# Patient Record
Sex: MALE | Race: WHITE | Employment: OTHER | URBAN - METROPOLITAN AREA
[De-identification: names, ages, dates, MRNs, and addresses within clinical notes are randomized per-mention and may not be internally consistent; named-entity substitution may affect disease eponyms.]

---

## 2019-02-07 DIAGNOSIS — M25.562 PAIN, JOINT, KNEE, LEFT: ICD-10-CM

## 2019-02-07 DIAGNOSIS — M25.561 PAIN IN JOINT OF RIGHT KNEE: Primary | ICD-10-CM

## 2019-03-04 ENCOUNTER — TELEPHONE (OUTPATIENT)
Dept: ORTHOPEDIC SURGERY | Age: 84
End: 2019-03-04

## 2019-08-21 ENCOUNTER — HOSPITAL ENCOUNTER (INPATIENT)
Age: 84
LOS: 3 days | Discharge: NURSING, REHAB OR LONG-TERM CARE | DRG: 101 | End: 2019-08-25
Attending: EMERGENCY MEDICINE | Admitting: INTERNAL MEDICINE
Payer: MEDICARE

## 2019-08-21 ENCOUNTER — APPOINTMENT (OUTPATIENT)
Dept: CT IMAGING | Age: 84
DRG: 101 | End: 2019-08-21
Payer: MEDICARE

## 2019-08-21 DIAGNOSIS — I48.91 ATRIAL FIBRILLATION WITH RVR (HCC): ICD-10-CM

## 2019-08-21 DIAGNOSIS — R41.82 ALTERED MENTAL STATUS, UNSPECIFIED ALTERED MENTAL STATUS TYPE: Primary | ICD-10-CM

## 2019-08-21 DIAGNOSIS — F41.9 ANXIETY: ICD-10-CM

## 2019-08-21 PROCEDURE — 87040 BLOOD CULTURE FOR BACTERIA: CPT

## 2019-08-21 PROCEDURE — 81001 URINALYSIS AUTO W/SCOPE: CPT

## 2019-08-21 PROCEDURE — 85025 COMPLETE CBC W/AUTO DIFF WBC: CPT

## 2019-08-21 PROCEDURE — 83605 ASSAY OF LACTIC ACID: CPT

## 2019-08-21 PROCEDURE — 99291 CRITICAL CARE FIRST HOUR: CPT

## 2019-08-21 PROCEDURE — 72125 CT NECK SPINE W/O DYE: CPT

## 2019-08-21 PROCEDURE — 36415 COLL VENOUS BLD VENIPUNCTURE: CPT

## 2019-08-21 PROCEDURE — 70450 CT HEAD/BRAIN W/O DYE: CPT

## 2019-08-21 PROCEDURE — 83735 ASSAY OF MAGNESIUM: CPT

## 2019-08-21 PROCEDURE — 93005 ELECTROCARDIOGRAM TRACING: CPT | Performed by: INTERNAL MEDICINE

## 2019-08-21 PROCEDURE — 84443 ASSAY THYROID STIM HORMONE: CPT

## 2019-08-21 PROCEDURE — 80048 BASIC METABOLIC PNL TOTAL CA: CPT

## 2019-08-22 ENCOUNTER — APPOINTMENT (OUTPATIENT)
Dept: GENERAL RADIOLOGY | Age: 84
DRG: 101 | End: 2019-08-22
Payer: MEDICARE

## 2019-08-22 ENCOUNTER — APPOINTMENT (OUTPATIENT)
Dept: CT IMAGING | Age: 84
DRG: 101 | End: 2019-08-22
Payer: MEDICARE

## 2019-08-22 ENCOUNTER — APPOINTMENT (OUTPATIENT)
Dept: VASCULAR LAB | Age: 84
DRG: 101 | End: 2019-08-22
Payer: MEDICARE

## 2019-08-22 PROBLEM — I48.91 ATRIAL FIBRILLATION (HCC): Status: ACTIVE | Noted: 2019-08-22

## 2019-08-22 PROBLEM — E87.1 HYPONATREMIA: Status: ACTIVE | Noted: 2019-08-22

## 2019-08-22 PROBLEM — I63.9 ACUTE CVA (CEREBROVASCULAR ACCIDENT) (HCC): Status: ACTIVE | Noted: 2019-08-22

## 2019-08-22 PROBLEM — I50.22 CHRONIC SYSTOLIC HEART FAILURE (HCC): Status: ACTIVE | Noted: 2019-08-22

## 2019-08-22 LAB
ANION GAP SERPL CALCULATED.3IONS-SCNC: 10 MMOL/L (ref 3–16)
ANION GAP SERPL CALCULATED.3IONS-SCNC: 13 MMOL/L (ref 3–16)
BACTERIA: ABNORMAL /HPF
BASOPHILS ABSOLUTE: 0 K/UL (ref 0–0.2)
BASOPHILS RELATIVE PERCENT: 0.2 %
BILIRUBIN URINE: NEGATIVE
BLOOD, URINE: NEGATIVE
BUN BLDV-MCNC: 16 MG/DL (ref 7–20)
BUN BLDV-MCNC: 17 MG/DL (ref 7–20)
CALCIUM SERPL-MCNC: 8.7 MG/DL (ref 8.3–10.6)
CALCIUM SERPL-MCNC: 9.2 MG/DL (ref 8.3–10.6)
CHLORIDE BLD-SCNC: 92 MMOL/L (ref 99–110)
CHLORIDE BLD-SCNC: 95 MMOL/L (ref 99–110)
CLARITY: CLEAR
CO2: 25 MMOL/L (ref 21–32)
CO2: 25 MMOL/L (ref 21–32)
COLOR: YELLOW
CREAT SERPL-MCNC: 0.9 MG/DL (ref 0.8–1.3)
CREAT SERPL-MCNC: 0.9 MG/DL (ref 0.8–1.3)
EKG ATRIAL RATE: 24 BPM
EKG DIAGNOSIS: NORMAL
EKG Q-T INTERVAL: 328 MS
EKG QRS DURATION: 158 MS
EKG QTC CALCULATION (BAZETT): 478 MS
EKG R AXIS: 127 DEGREES
EKG T AXIS: -34 DEGREES
EKG VENTRICULAR RATE: 128 BPM
EOSINOPHILS ABSOLUTE: 0.1 K/UL (ref 0–0.6)
EOSINOPHILS RELATIVE PERCENT: 1.3 %
EPITHELIAL CELLS, UA: ABNORMAL /HPF
ESTIMATED AVERAGE GLUCOSE: 151.3 MG/DL
GFR AFRICAN AMERICAN: >60
GFR AFRICAN AMERICAN: >60
GFR NON-AFRICAN AMERICAN: >60
GFR NON-AFRICAN AMERICAN: >60
GLUCOSE BLD-MCNC: 120 MG/DL (ref 70–99)
GLUCOSE BLD-MCNC: 137 MG/DL (ref 70–99)
GLUCOSE BLD-MCNC: 150 MG/DL (ref 70–99)
GLUCOSE BLD-MCNC: 229 MG/DL (ref 70–99)
GLUCOSE BLD-MCNC: 77 MG/DL (ref 70–99)
GLUCOSE BLD-MCNC: 78 MG/DL (ref 70–99)
GLUCOSE BLD-MCNC: 83 MG/DL (ref 70–99)
GLUCOSE BLD-MCNC: 85 MG/DL (ref 70–99)
GLUCOSE BLD-MCNC: 98 MG/DL (ref 70–99)
GLUCOSE URINE: NEGATIVE MG/DL
HBA1C MFR BLD: 6.9 %
HCT VFR BLD CALC: 31.8 % (ref 40.5–52.5)
HEMOGLOBIN: 10.3 G/DL (ref 13.5–17.5)
KETONES, URINE: NEGATIVE MG/DL
LACTIC ACID, SEPSIS: 1.2 MMOL/L (ref 0.4–1.9)
LACTIC ACID: 1.7 MMOL/L (ref 0.4–2)
LEUKOCYTE ESTERASE, URINE: NEGATIVE
LYMPHOCYTES ABSOLUTE: 0.8 K/UL (ref 1–5.1)
LYMPHOCYTES RELATIVE PERCENT: 9.6 %
MAGNESIUM: 1.7 MG/DL (ref 1.8–2.4)
MCH RBC QN AUTO: 29.5 PG (ref 26–34)
MCHC RBC AUTO-ENTMCNC: 32.6 G/DL (ref 31–36)
MCV RBC AUTO: 90.5 FL (ref 80–100)
MICROSCOPIC EXAMINATION: YES
MONOCYTES ABSOLUTE: 0.7 K/UL (ref 0–1.3)
MONOCYTES RELATIVE PERCENT: 9 %
NEUTROPHILS ABSOLUTE: 6.6 K/UL (ref 1.7–7.7)
NEUTROPHILS RELATIVE PERCENT: 79.9 %
NITRITE, URINE: NEGATIVE
PDW BLD-RTO: 18 % (ref 12.4–15.4)
PERFORMED ON: ABNORMAL
PERFORMED ON: NORMAL
PH UA: 6 (ref 5–8)
PLATELET # BLD: 119 K/UL (ref 135–450)
PMV BLD AUTO: 7.1 FL (ref 5–10.5)
POTASSIUM REFLEX MAGNESIUM: 3.5 MMOL/L (ref 3.5–5.1)
POTASSIUM SERPL-SCNC: 4.1 MMOL/L (ref 3.5–5.1)
PROTEIN UA: ABNORMAL MG/DL
RBC # BLD: 3.51 M/UL (ref 4.2–5.9)
RBC UA: ABNORMAL /HPF (ref 0–2)
SODIUM BLD-SCNC: 130 MMOL/L (ref 136–145)
SODIUM BLD-SCNC: 130 MMOL/L (ref 136–145)
SPECIFIC GRAVITY UA: 1.01 (ref 1–1.03)
TSH SERPL DL<=0.05 MIU/L-ACNC: 8.7 UIU/ML (ref 0.27–4.2)
URINE REFLEX TO CULTURE: ABNORMAL
URINE TYPE: ABNORMAL
UROBILINOGEN, URINE: 4 E.U./DL
WBC # BLD: 8.3 K/UL (ref 4–11)
WBC UA: ABNORMAL /HPF (ref 0–5)

## 2019-08-22 PROCEDURE — 70498 CT ANGIOGRAPHY NECK: CPT

## 2019-08-22 PROCEDURE — 95819 EEG AWAKE AND ASLEEP: CPT

## 2019-08-22 PROCEDURE — 70496 CT ANGIOGRAPHY HEAD: CPT

## 2019-08-22 PROCEDURE — 71045 X-RAY EXAM CHEST 1 VIEW: CPT

## 2019-08-22 PROCEDURE — 6370000000 HC RX 637 (ALT 250 FOR IP): Performed by: INTERNAL MEDICINE

## 2019-08-22 PROCEDURE — 2580000003 HC RX 258: Performed by: INTERNAL MEDICINE

## 2019-08-22 PROCEDURE — 1200000000 HC SEMI PRIVATE

## 2019-08-22 PROCEDURE — 6360000002 HC RX W HCPCS: Performed by: EMERGENCY MEDICINE

## 2019-08-22 PROCEDURE — 2500000003 HC RX 250 WO HCPCS: Performed by: EMERGENCY MEDICINE

## 2019-08-22 PROCEDURE — 80048 BASIC METABOLIC PNL TOTAL CA: CPT

## 2019-08-22 PROCEDURE — 93880 EXTRACRANIAL BILAT STUDY: CPT

## 2019-08-22 PROCEDURE — 92526 ORAL FUNCTION THERAPY: CPT

## 2019-08-22 PROCEDURE — 96374 THER/PROPH/DIAG INJ IV PUSH: CPT

## 2019-08-22 PROCEDURE — 87040 BLOOD CULTURE FOR BACTERIA: CPT

## 2019-08-22 PROCEDURE — 36415 COLL VENOUS BLD VENIPUNCTURE: CPT

## 2019-08-22 PROCEDURE — 83605 ASSAY OF LACTIC ACID: CPT

## 2019-08-22 PROCEDURE — 83036 HEMOGLOBIN GLYCOSYLATED A1C: CPT

## 2019-08-22 PROCEDURE — 92610 EVALUATE SWALLOWING FUNCTION: CPT

## 2019-08-22 PROCEDURE — 6360000004 HC RX CONTRAST MEDICATION: Performed by: EMERGENCY MEDICINE

## 2019-08-22 PROCEDURE — 2580000003 HC RX 258: Performed by: EMERGENCY MEDICINE

## 2019-08-22 RX ORDER — SODIUM PHOSPHATE, DIBASIC AND SODIUM PHOSPHATE, MONOBASIC 7; 19 G/133ML; G/133ML
1 ENEMA RECTAL DAILY PRN
Status: ON HOLD | COMMUNITY
End: 2019-08-22

## 2019-08-22 RX ORDER — NICOTINE POLACRILEX 4 MG
15 LOZENGE BUCCAL PRN
Status: DISCONTINUED | OUTPATIENT
Start: 2019-08-22 | End: 2019-08-25 | Stop reason: HOSPADM

## 2019-08-22 RX ORDER — DEXTROSE MONOHYDRATE 25 G/50ML
12.5 INJECTION, SOLUTION INTRAVENOUS PRN
Status: DISCONTINUED | OUTPATIENT
Start: 2019-08-22 | End: 2019-08-25 | Stop reason: HOSPADM

## 2019-08-22 RX ORDER — SODIUM CHLORIDE 0.9 % (FLUSH) 0.9 %
10 SYRINGE (ML) INJECTION EVERY 12 HOURS SCHEDULED
Status: DISCONTINUED | OUTPATIENT
Start: 2019-08-22 | End: 2019-08-25 | Stop reason: HOSPADM

## 2019-08-22 RX ORDER — 0.9 % SODIUM CHLORIDE 0.9 %
500 INTRAVENOUS SOLUTION INTRAVENOUS ONCE
Status: COMPLETED | OUTPATIENT
Start: 2019-08-22 | End: 2019-08-22

## 2019-08-22 RX ORDER — CARVEDILOL 25 MG/1
25 TABLET ORAL 2 TIMES DAILY WITH MEALS
Status: DISCONTINUED | OUTPATIENT
Start: 2019-08-22 | End: 2019-08-22

## 2019-08-22 RX ORDER — CLOPIDOGREL BISULFATE 75 MG/1
75 TABLET ORAL DAILY
Status: DISCONTINUED | OUTPATIENT
Start: 2019-08-22 | End: 2019-08-22

## 2019-08-22 RX ORDER — FUROSEMIDE 40 MG/1
40 TABLET ORAL 2 TIMES DAILY
Status: ON HOLD | COMMUNITY
End: 2020-10-17

## 2019-08-22 RX ORDER — ACETAMINOPHEN 325 MG/1
650 TABLET ORAL EVERY 4 HOURS PRN
Status: ON HOLD | COMMUNITY
End: 2020-11-30 | Stop reason: HOSPADM

## 2019-08-22 RX ORDER — TRAMADOL HYDROCHLORIDE 50 MG/1
50 TABLET ORAL EVERY 6 HOURS PRN
Status: ON HOLD | COMMUNITY
End: 2019-08-23 | Stop reason: CLARIF

## 2019-08-22 RX ORDER — MAGNESIUM SULFATE IN WATER 40 MG/ML
2 INJECTION, SOLUTION INTRAVENOUS ONCE
Status: COMPLETED | OUTPATIENT
Start: 2019-08-22 | End: 2019-08-22

## 2019-08-22 RX ORDER — ASPIRIN 325 MG
325 TABLET ORAL DAILY
Status: DISCONTINUED | OUTPATIENT
Start: 2019-08-22 | End: 2019-08-24

## 2019-08-22 RX ORDER — TRAZODONE HYDROCHLORIDE 50 MG/1
50 TABLET ORAL NIGHTLY
Status: DISCONTINUED | OUTPATIENT
Start: 2019-08-22 | End: 2019-08-22

## 2019-08-22 RX ORDER — SODIUM CHLORIDE 9 MG/ML
INJECTION, SOLUTION INTRAVENOUS CONTINUOUS
Status: DISCONTINUED | OUTPATIENT
Start: 2019-08-22 | End: 2019-08-22

## 2019-08-22 RX ORDER — ONDANSETRON 2 MG/ML
4 INJECTION INTRAMUSCULAR; INTRAVENOUS EVERY 6 HOURS PRN
Status: DISCONTINUED | OUTPATIENT
Start: 2019-08-22 | End: 2019-08-25 | Stop reason: HOSPADM

## 2019-08-22 RX ORDER — AMPICILLIN 500 MG/1
500 CAPSULE ORAL 4 TIMES DAILY
Status: ON HOLD | COMMUNITY
End: 2019-08-25 | Stop reason: HOSPADM

## 2019-08-22 RX ORDER — DULOXETIN HYDROCHLORIDE 60 MG/1
60 CAPSULE, DELAYED RELEASE ORAL DAILY
COMMUNITY
End: 2020-02-28 | Stop reason: CLARIF

## 2019-08-22 RX ORDER — METOPROLOL TARTRATE 5 MG/5ML
5 INJECTION INTRAVENOUS ONCE
Status: COMPLETED | OUTPATIENT
Start: 2019-08-22 | End: 2019-08-22

## 2019-08-22 RX ORDER — TAMSULOSIN HYDROCHLORIDE 0.4 MG/1
0.4 CAPSULE ORAL DAILY
Status: DISCONTINUED | OUTPATIENT
Start: 2019-08-22 | End: 2019-08-25 | Stop reason: HOSPADM

## 2019-08-22 RX ORDER — SODIUM CHLORIDE 0.9 % (FLUSH) 0.9 %
10 SYRINGE (ML) INJECTION PRN
Status: DISCONTINUED | OUTPATIENT
Start: 2019-08-22 | End: 2019-08-25 | Stop reason: HOSPADM

## 2019-08-22 RX ORDER — DEXTROSE MONOHYDRATE 50 MG/ML
100 INJECTION, SOLUTION INTRAVENOUS PRN
Status: DISCONTINUED | OUTPATIENT
Start: 2019-08-22 | End: 2019-08-25 | Stop reason: HOSPADM

## 2019-08-22 RX ORDER — ATORVASTATIN CALCIUM 20 MG/1
20 TABLET, FILM COATED ORAL DAILY
Status: DISCONTINUED | OUTPATIENT
Start: 2019-08-22 | End: 2019-08-25 | Stop reason: HOSPADM

## 2019-08-22 RX ORDER — CETIRIZINE HYDROCHLORIDE 10 MG/1
10 TABLET ORAL DAILY
COMMUNITY
End: 2020-02-28 | Stop reason: CLARIF

## 2019-08-22 RX ORDER — FUROSEMIDE 20 MG/1
20 TABLET ORAL DAILY
Status: DISCONTINUED | OUTPATIENT
Start: 2019-08-22 | End: 2019-08-25 | Stop reason: HOSPADM

## 2019-08-22 RX ADMIN — APIXABAN 5 MG: 5 TABLET, FILM COATED ORAL at 13:11

## 2019-08-22 RX ADMIN — SODIUM CHLORIDE: 0.9 INJECTION, SOLUTION INTRAVENOUS at 04:36

## 2019-08-22 RX ADMIN — METOPROLOL TARTRATE 25 MG: 25 TABLET ORAL at 18:01

## 2019-08-22 RX ADMIN — METOPROLOL TARTRATE 5 MG: 5 INJECTION INTRAVENOUS at 00:52

## 2019-08-22 RX ADMIN — APIXABAN 5 MG: 5 TABLET, FILM COATED ORAL at 21:05

## 2019-08-22 RX ADMIN — ATORVASTATIN CALCIUM 20 MG: 20 TABLET, FILM COATED ORAL at 10:00

## 2019-08-22 RX ADMIN — Medication 10 ML: at 21:06

## 2019-08-22 RX ADMIN — FUROSEMIDE 20 MG: 20 TABLET ORAL at 13:08

## 2019-08-22 RX ADMIN — IOPAMIDOL 80 ML: 755 INJECTION, SOLUTION INTRAVENOUS at 01:08

## 2019-08-22 RX ADMIN — TAMSULOSIN HYDROCHLORIDE 0.4 MG: 0.4 CAPSULE ORAL at 10:00

## 2019-08-22 RX ADMIN — SODIUM CHLORIDE 500 ML: 9 INJECTION, SOLUTION INTRAVENOUS at 01:55

## 2019-08-22 RX ADMIN — INSULIN LISPRO 1 UNITS: 100 INJECTION, SOLUTION INTRAVENOUS; SUBCUTANEOUS at 21:05

## 2019-08-22 RX ADMIN — MAGNESIUM SULFATE HEPTAHYDRATE 2 G: 40 INJECTION, SOLUTION INTRAVENOUS at 01:55

## 2019-08-22 ASSESSMENT — ENCOUNTER SYMPTOMS
ABDOMINAL PAIN: 0
COUGH: 0

## 2019-08-22 ASSESSMENT — PAIN SCALES - GENERAL: PAINLEVEL_OUTOF10: 0

## 2019-08-22 NOTE — ED PROVIDER NOTES
repolarization changes, likely rate related. Measurable intervals normal.    RADIOLOGY:  CTA NECK W CONTRAST   Final Result   1. This exam is degraded by poor contrast bolus/timing and extensive    beam hardening artifact from dental amalgam.  Evaluation of the carotid    bulbs and proximal internal carotid arteries is essentially nondiagnostic. There is otherwise no significant steno-occlusive lesion in the neck. 2.  Patchy marrow attenuation diffusely is nonspecific. Please correlate    with any history of cancer. CTA HEAD W CONTRAST   Final Result     No significant steno-occlusive lesion in the head. XR CHEST PORTABLE   Final Result   Cardiomegaly. No acute pulmonary findings. CT Cervical Spine WO Contrast   Final Result     No acute fracture. Anterolisthesis at C3-4 measuring 2 mm and at C4-5 measuring 2 mm is    likely due to degenerative disc disease. Moderate cervical spondylosis. CT Head WO Contrast   Final Result     No acute CT findings in the head/brain.           MRI brain without contrast    (Results Pending)       LABS:   Results for orders placed or performed during the hospital encounter of 08/21/19   CBC Auto Differential   Result Value Ref Range    WBC 8.3 4.0 - 11.0 K/uL    RBC 3.51 (L) 4.20 - 5.90 M/uL    Hemoglobin 10.3 (L) 13.5 - 17.5 g/dL    Hematocrit 31.8 (L) 40.5 - 52.5 %    MCV 90.5 80.0 - 100.0 fL    MCH 29.5 26.0 - 34.0 pg    MCHC 32.6 31.0 - 36.0 g/dL    RDW 18.0 (H) 12.4 - 15.4 %    Platelets 417 (L) 826 - 450 K/uL    MPV 7.1 5.0 - 10.5 fL    Neutrophils % 79.9 %    Lymphocytes % 9.6 %    Monocytes % 9.0 %    Eosinophils % 1.3 %    Basophils % 0.2 %    Neutrophils # 6.6 1.7 - 7.7 K/uL    Lymphocytes # 0.8 (L) 1.0 - 5.1 K/uL    Monocytes # 0.7 0.0 - 1.3 K/uL    Eosinophils # 0.1 0.0 - 0.6 K/uL    Basophils # 0.0 0.0 - 0.2 K/uL   Basic Metabolic Panel w/ Reflex to MG   Result Value Ref Range    Sodium 130 (L) 136 - 145 mmol/L reviewed. Patient was given the following medications:  Orders Placed This Encounter   Medications    metoprolol (LOPRESSOR) injection 5 mg    iopamidol (ISOVUE-370) 76 % injection 80 mL    magnesium sulfate 2 g in 50 mL IVPB premix    0.9 % sodium chloride bolus    aspirin tablet 325 mg    atorvastatin (LIPITOR) tablet 20 mg    carvedilol (COREG) tablet 25 mg    clopidogrel (PLAVIX) tablet 75 mg    tamsulosin (FLOMAX) capsule 0.4 mg    traZODone (DESYREL) tablet 50 mg    sodium chloride flush 0.9 % injection 10 mL    sodium chloride flush 0.9 % injection 10 mL    magnesium hydroxide (MILK OF MAGNESIA) 400 MG/5ML suspension 30 mL    ondansetron (ZOFRAN) injection 4 mg    glucose (GLUTOSE) 40 % oral gel 15 g    dextrose 50 % IV solution    glucagon (rDNA) injection 1 mg    dextrose 5 % solution    insulin lispro (HUMALOG) injection pen 0-6 Units    insulin lispro (HUMALOG) injection pen 0-3 Units    apixaban (ELIQUIS) tablet 2.5 mg    0.9 % sodium chloride infusion       CONSULTS:  IP CONSULT TO PHARMACY  IP CONSULT TO HOSPITALIST  IP CONSULT TO NEUROLOGY    MEDICAL DECISIONMAKING / ASSESSMENT / Rae Yolette is a 80 y.o. male with history of stroke/TIA, Parkinson's disease, A. fib on Eliquis who presents the emergency department today with altered mental status. He was found with decreased responsiveness at his care facility and does not have a last known normal.  Vitals notable for tachycardia with a regular rhythm on the monitor, A. fib on EKG without ischemic changes. Glucose is normal.  Patient has aphasia and occasional nonsensical speech, he is oriented to person only. Initial NIH stroke scale is 3. His family states this has happened occasionally, but is not his baseline. No evidence of infectious source on exam, labs, or chest x-ray. Patient taken to CT scanner and without acute findings. CTA without large vessel occlusion.   He is not a TPA candidate given unclear last

## 2019-08-22 NOTE — PROGRESS NOTES
Speech Language Pathology  Facility/Department: [facility]   CLINICAL BEDSIDE SWALLOW EVALUATION/Treatment note    NAME: Cindy Hensley  : 1933  MRN: [id number]    ADMISSION DATE: 2019  ADMITTING DIAGNOSIS: has 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; 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; and Acute CVA (cerebrovascular accident) ([address] on their problem list.  ONSET DATE: 19    Recent Chest Xray 8/21/10  Cardiomegaly.  No acute pulmonary findings.         CT of head 19  No acute CT findings in the head/brain.         Date of Eval: 2019  Evaluating Therapist: Lavina Klinefelter    Current Diet level:  Current Diet : NPO  Current Liquid Diet : NPO    Primary Complaint  Patient Complaint: pt states he doesn't remember having any problems with speech coming in    Pain: denies    Reason for Referral  Cindy Hensley was referred for a bedside swallow evaluation to assess the efficiency of his swallow function, identify signs and symptoms of aspiration and make recommendations regarding safe dietary consistencies, effective compensatory strategies, and safe eating environment. History Of Present Illness:   80 y.o. male with past medical history of Parkinson disease, TIAs chronic A. fib, on anticoagulation with Eliquis was found at his nursing facility less responsive. It is unknown when he was at his normal baseline. His speech is consistent with word salad. Further history is not available from the patient due to his altered level of mentation.       Impression  Dysphagia Diagnosis: Swallow function appears grossly intact  Dysphagia Impression : pt alert, oriented, follows commands and conveses approriately. No word finding or paraphasias noted during conversation. Pt spouse states speech resolved, pt states he doesn't recall having problems when admitted. Spouse reports this has happened 4 times and then speech resolves fully.   Pt denies any swallowing issues at baseline. Presented pt with puree and thin liquids as well as a kimmy cracker. Pt demonstrated no overt signs of aspiration: no coughing/throat clearing or change in vocal quality. Good laryngeal elevation upon palpation of anterior neck. Pt with no difficulty mastication cracker, no oral residue. Dysphagia Outcome Severity Scale: Level 7: Normal in all situations     Treatment Plan  Requires SLP Intervention: Yes  Duration/Frequency of Treatment: 1-2 x  D/C Recommendations: To be determined     Recommended Diet and Intervention  Diet Solids Recommendation: Regular  Liquid Consistency Recommendation: Thin -if any s/s of aspiration emerge, or there is respiratory decline,  make NPO until further evaluated by SLP  Recommended Form of Meds: Whole with puree  Therapeutic Interventions: Diet tolerance monitoring;Oral care; Patient/Family education    Compensatory Swallowing Strategies  Compensatory Swallowing Strategies: Upright as possible for all oral intake    Treatment/Goals  Pt to be seen to address the following goals:  1-The patient will tolerate recommended diet without observed clinical signs of aspiration  2- The patient Ross Sayer will verbalize/demonstrate understanding of dysphagia recommendations  8/22: Educated pt to purpose of visit, s/s of aspiration, concern if aspiration occurs, rationale for diet recommendation/strategies to reduce risk for aspiration and instruction to notify staff if any signs emerge. Also discussed will monitor need for full speech/language/cog eval.  Pt and spouse stated understanding  Cont goal    General  Chart Reviewed: Yes  Behavior/Cognition: Alert; Cooperative;Pleasant mood  Respiratory Status: Room air  Breath Sounds: Clear  Communication Observation: Functional - no anomia/paraphasias noted. Speech was fluent and intelligible. Spouse reports speech has resolved and that this has happened 4 x, with resolution of speech.   Follows Directions: Simple  Dentition: Adequate  Patient Positioning: Upright in bed  Baseline Vocal Quality: Normal  Volitional Cough: Strong  Prior Dysphagia History: none  Consistencies Administered: Reg solid; Thin - teaspoon; Thin - cup; Thin - straw; Ice Chips    Vision/Hearing  Vision  Vision: Within Functional Limits  Hearing  Hearing: Within functional limits    Oral Motor Deficits  Oral/Motor  Oral Motor: Within functional limits    Oral Phase Dysfunction  WFL     Indicators of Pharyngeal Phase Dysfunction  WFL-     Prognosis  Prognosis  Prognosis for safe diet advancement: good  Barriers to reach goals: (resolution of symptoms)  Individuals consulted  Consulted and agree with results and recommendations: Patient; Family member;RN  Family member consulted: spouse    Education  Patient Education: pt/spouse educated to purpose of visit  Patient Education Response: Verbalizes understanding  Safety Devices in place: Yes  Type of devices: Call light within reach       Therapy Time  SLP Individual Minutes  Time In: 0755  Time Out: 0820  Minutes: 25     Plan:  Recommended diet: regular diet/thin liquids - if any s/s of aspiration emerge, or there is respiratory decline,  make NPO until further evaluated by SLP  Dc recommendation: monitor need for full speech eval  Pt therapy goal: denies any problems, n/a  Pt dc goal: to get home  Louie Echols M.S./[facility]-SLP #[id number]  Pg.  # [phone]  Needs met prior to leaving room, call light within reach, d/w HARPREET Pastrana  This document will serve as a dc summary if pt dc prior to next visit  8/22/2019 8:44 AM

## 2019-08-22 NOTE — H&P
Hospital Medicine History & Physical      PCP: [address]    Date of Admission: 8/21/2019    Date of Service: Pt seen/examined on 8/22/2019 and Admitted to Inpatient with expected LOS greater than two midnights due to medical therapy. Chief Complaint: Altered level of consciousness      History Of Present Illness:     80 y.o. male with past medical history of Parkinson disease, TIAs chronic A. fib, on anticoagulation with Eliquis was found at his nursing facility less responsive. It is unknown when he was at his normal baseline. His speech is consistent with word salad. Further history is not available from the patient due to his altered level of mentation. Past Medical History:          Diagnosis Date    Arthritis     Back pain     CAD (coronary artery disease)     [facility] [address]     prostate    Diabetes mellitus ([facility] [address] [facility]     Hyperlipidemia     Hypertension     Rosacea        Past Surgical History:          Procedure Laterality Date    BACK SURGERY      CATARACT REMOVAL WITH IMPLANT      COLONOSCOPY  9/21/11    COLONOSCOPY  2/11/2015    polyp, diverticulosis    CORONARY ANGIOPLASTY WITH STENT PLACEMENT      CORONARY ARTERY BYPASS GRAFT      EYE SURGERY      JOINT REPLACEMENT      knee    PROSTATE SURGERY      seed implants    SHOULDER SURGERY         Medications Prior to Admission:      Prior to Admission medications    Medication Sig Start Date End Date Taking? Authorizing Provider   B-D ULTRAFINE III SHORT PEN 31G X 8 MM MISC  8/24/13   Historical Provider, MD   traZODone (DESYREL) 50 MG tablet Take 50 mg by mouth nightly. Historical Provider, MD   aspirin 325 MG tablet Take 325 mg by mouth daily. Historical Provider, MD   Insulin Isophane Human (HUMULIN N SC) Inject 26 Units into the skin daily. Historical Provider, MD   Insulin Lispro Prot & Lispro (HUMALOG MIX 75/25 SC) Inject 26 Units into the skin nightly.     Historical Provider, MD   tamsulosin (FLOMAX) 0.4

## 2019-08-22 NOTE — PLAN OF CARE
Problem: Falls - Risk of:  Goal: Will remain free from falls  Description  Will remain free from falls  Pt is free from falls. Fall precautions are in place: bed is locked and in lowest position, side rails are up x 2, bed alarm is on, nonskid socks are on, bedside table and call light are within reach. Will continue to monitor.   8/22/2019 1733 by Ruchi Nascimento RN  Outcome: Ongoing

## 2019-08-23 ENCOUNTER — APPOINTMENT (OUTPATIENT)
Dept: CT IMAGING | Age: 84
DRG: 101 | End: 2019-08-23
Payer: MEDICARE

## 2019-08-23 LAB
ANION GAP SERPL CALCULATED.3IONS-SCNC: 10 MMOL/L (ref 3–16)
BUN BLDV-MCNC: 13 MG/DL (ref 7–20)
CALCIUM SERPL-MCNC: 8.8 MG/DL (ref 8.3–10.6)
CHLORIDE BLD-SCNC: 94 MMOL/L (ref 99–110)
CHOLESTEROL, TOTAL: 93 MG/DL (ref 0–199)
CO2: 26 MMOL/L (ref 21–32)
CREAT SERPL-MCNC: 0.8 MG/DL (ref 0.8–1.3)
GFR AFRICAN AMERICAN: >60
GFR NON-AFRICAN AMERICAN: >60
GLUCOSE BLD-MCNC: 138 MG/DL (ref 70–99)
GLUCOSE BLD-MCNC: 145 MG/DL (ref 70–99)
GLUCOSE BLD-MCNC: 150 MG/DL (ref 70–99)
GLUCOSE BLD-MCNC: 186 MG/DL (ref 70–99)
GLUCOSE BLD-MCNC: 217 MG/DL (ref 70–99)
HDLC SERPL-MCNC: 37 MG/DL (ref 40–60)
LDL CHOLESTEROL CALCULATED: 41 MG/DL
PERFORMED ON: ABNORMAL
POTASSIUM SERPL-SCNC: 4 MMOL/L (ref 3.5–5.1)
SODIUM BLD-SCNC: 130 MMOL/L (ref 136–145)
TRIGL SERPL-MCNC: 76 MG/DL (ref 0–150)
VLDLC SERPL CALC-MCNC: 15 MG/DL

## 2019-08-23 PROCEDURE — 6370000000 HC RX 637 (ALT 250 FOR IP): Performed by: INTERNAL MEDICINE

## 2019-08-23 PROCEDURE — 1200000000 HC SEMI PRIVATE

## 2019-08-23 PROCEDURE — 97116 GAIT TRAINING THERAPY: CPT

## 2019-08-23 PROCEDURE — 97530 THERAPEUTIC ACTIVITIES: CPT

## 2019-08-23 PROCEDURE — 80061 LIPID PANEL: CPT

## 2019-08-23 PROCEDURE — 2580000003 HC RX 258: Performed by: INTERNAL MEDICINE

## 2019-08-23 PROCEDURE — 36415 COLL VENOUS BLD VENIPUNCTURE: CPT

## 2019-08-23 PROCEDURE — 70450 CT HEAD/BRAIN W/O DYE: CPT

## 2019-08-23 PROCEDURE — 80048 BASIC METABOLIC PNL TOTAL CA: CPT

## 2019-08-23 PROCEDURE — 97162 PT EVAL MOD COMPLEX 30 MIN: CPT

## 2019-08-23 PROCEDURE — 92526 ORAL FUNCTION THERAPY: CPT | Performed by: SPEECH-LANGUAGE PATHOLOGIST

## 2019-08-23 RX ORDER — TRAZODONE HYDROCHLORIDE 50 MG/1
50 TABLET ORAL NIGHTLY PRN
Status: ON HOLD | COMMUNITY
End: 2019-08-25 | Stop reason: SDUPTHER

## 2019-08-23 RX ORDER — LORAZEPAM 1 MG/1
1 TABLET ORAL EVERY 6 HOURS PRN
Status: ON HOLD | COMMUNITY
End: 2019-08-25 | Stop reason: SDUPTHER

## 2019-08-23 RX ORDER — METOPROLOL SUCCINATE 25 MG/1
25 TABLET, EXTENDED RELEASE ORAL DAILY
Status: ON HOLD | COMMUNITY
End: 2019-08-25 | Stop reason: HOSPADM

## 2019-08-23 RX ORDER — ALBUTEROL SULFATE 90 UG/1
2 AEROSOL, METERED RESPIRATORY (INHALATION) EVERY 4 HOURS PRN
Status: ON HOLD | COMMUNITY
End: 2020-10-17

## 2019-08-23 RX ORDER — BISACODYL 10 MG
10 SUPPOSITORY, RECTAL RECTAL DAILY PRN
COMMUNITY
End: 2020-02-28 | Stop reason: CLARIF

## 2019-08-23 RX ORDER — OXYCODONE HYDROCHLORIDE 5 MG/1
10 TABLET ORAL EVERY 4 HOURS PRN
Status: ON HOLD | COMMUNITY
End: 2019-08-25 | Stop reason: HOSPADM

## 2019-08-23 RX ORDER — CLOTRIMAZOLE 1 %
CREAM (GRAM) TOPICAL 2 TIMES DAILY
COMMUNITY
End: 2020-02-28 | Stop reason: CLARIF

## 2019-08-23 RX ORDER — ONDANSETRON 4 MG/1
4 TABLET, FILM COATED ORAL EVERY 8 HOURS PRN
Status: ON HOLD | COMMUNITY
End: 2019-08-25 | Stop reason: HOSPADM

## 2019-08-23 RX ORDER — LORAZEPAM 0.5 MG/1
0.5 TABLET ORAL EVERY 8 HOURS PRN
Status: DISCONTINUED | OUTPATIENT
Start: 2019-08-23 | End: 2019-08-25 | Stop reason: HOSPADM

## 2019-08-23 RX ORDER — METOPROLOL TARTRATE 50 MG/1
50 TABLET, FILM COATED ORAL 2 TIMES DAILY
Status: DISCONTINUED | OUTPATIENT
Start: 2019-08-23 | End: 2019-08-25

## 2019-08-23 RX ORDER — POLYETHYLENE GLYCOL 3350 17 G/17G
17 POWDER, FOR SOLUTION ORAL DAILY
Status: ON HOLD | COMMUNITY
End: 2020-11-30 | Stop reason: HOSPADM

## 2019-08-23 RX ORDER — SODIUM PHOSPHATE, DIBASIC AND SODIUM PHOSPHATE, MONOBASIC 7; 19 G/133ML; G/133ML
1 ENEMA RECTAL DAILY PRN
COMMUNITY
End: 2020-02-28 | Stop reason: CLARIF

## 2019-08-23 RX ADMIN — Medication 10 ML: at 20:06

## 2019-08-23 RX ADMIN — ASPIRIN 325 MG ORAL TABLET 325 MG: 325 PILL ORAL at 09:04

## 2019-08-23 RX ADMIN — FUROSEMIDE 20 MG: 20 TABLET ORAL at 09:05

## 2019-08-23 RX ADMIN — INSULIN LISPRO 1 UNITS: 100 INJECTION, SOLUTION INTRAVENOUS; SUBCUTANEOUS at 17:11

## 2019-08-23 RX ADMIN — METOPROLOL TARTRATE 50 MG: 50 TABLET ORAL at 20:06

## 2019-08-23 RX ADMIN — ATORVASTATIN CALCIUM 20 MG: 20 TABLET, FILM COATED ORAL at 09:05

## 2019-08-23 RX ADMIN — Medication 10 ML: at 09:13

## 2019-08-23 RX ADMIN — INSULIN LISPRO 1 UNITS: 100 INJECTION, SOLUTION INTRAVENOUS; SUBCUTANEOUS at 07:44

## 2019-08-23 RX ADMIN — INSULIN LISPRO 1 UNITS: 100 INJECTION, SOLUTION INTRAVENOUS; SUBCUTANEOUS at 20:07

## 2019-08-23 RX ADMIN — METOPROLOL TARTRATE 25 MG: 25 TABLET ORAL at 07:43

## 2019-08-23 RX ADMIN — TAMSULOSIN HYDROCHLORIDE 0.4 MG: 0.4 CAPSULE ORAL at 09:05

## 2019-08-23 RX ADMIN — INSULIN LISPRO 1 UNITS: 100 INJECTION, SOLUTION INTRAVENOUS; SUBCUTANEOUS at 11:03

## 2019-08-23 RX ADMIN — LORAZEPAM 0.5 MG: 0.5 TABLET ORAL at 12:10

## 2019-08-23 RX ADMIN — APIXABAN 5 MG: 5 TABLET, FILM COATED ORAL at 09:04

## 2019-08-23 RX ADMIN — APIXABAN 5 MG: 5 TABLET, FILM COATED ORAL at 20:06

## 2019-08-23 ASSESSMENT — PAIN SCALES - GENERAL
PAINLEVEL_OUTOF10: 0

## 2019-08-23 NOTE — PROGRESS NOTES
Clinical Pharmacy Progress Note  Patient Education    Patient and wife counseled about Keppra. Reviewed indication and why medication is being used, importance of taking as prescribed, how to take, and what to do if a dose is missed. Discussed potential side effects of medication, including mood changes and CNS depression. Patient and wife verbalized understanding and teach back method was used for verification. All questions answered. Patient told to call with further questions.     Iram MoreauD, BCPS  8/23/2019 4:48 PM

## 2019-08-23 NOTE — PROGRESS NOTES
Speech Language Pathology  Dysphagia - attempt    Chart reviewed, d/w RN Keisha Brooke who states no concerns for swallowing. She reports pt back to baseline. RN states wife states prior to this, pt little forgetful at baseline. Pt sleeping at this time, RN states pt has not slept. Therefore, will follow up later this date or as pt available and schedule allows. Annabella Horan M.S./[facility]-SLP #[id number]  Pg.  # [phone]

## 2019-08-23 NOTE — PROGRESS NOTES
Hospital Medicine Care  Progress Note      Chief complain; Altered Mental Status and Atrial Fibrillation (RVR)            Subjective:     Feels well  Ambulating in arias way  Per family confusion improved. No focal deficits. Denies any cp, sob, nausea, emesis. Review of Systems:     Review of Systems as mentioned above, other ros is negative. Objective:       Medications        Scheduled Meds:   metoprolol tartrate  50 mg Oral BID    aspirin  325 mg Oral Daily    atorvastatin  20 mg Oral Daily    tamsulosin  0.4 mg Oral Daily    sodium chloride flush  10 mL Intravenous 2 times per day    insulin lispro  0-6 Units Subcutaneous TID WC    insulin lispro  0-3 Units Subcutaneous Nightly    furosemide  20 mg Oral Daily    apixaban  5 mg Oral BID     Continuous Infusions:   dextrose       PRN Meds:.sodium chloride flush, magnesium hydroxide, ondansetron, glucose, dextrose, glucagon (rDNA), dextrose      Allergies  Allergies   Allergen Reactions    Gabapentin     Morphine          Vitals:    08/22/19 2301 08/23/19 0331 08/23/19 0737 08/23/19 0930   BP: 119/81 110/71 (!) 144/90 117/79   Pulse: 79 103 116 101   Resp: 16 16 16 16   Temp: 98.1 °F (36.7 °C) 98.2 °F (36.8 °C) 98.8 °F (37.1 °C) 97.9 °F (36.6 °C)   TempSrc: Oral Oral Oral Oral   SpO2: 97% 100% 93% 96%   Weight:       Height:             Physical exam:         Physical Exam   Constitutional: He is oriented to person, place, and time. He appears well-developed and well-nourished. HENT:   Head: Normocephalic and atraumatic. Cardiovascular: Normal rate. An irregular rhythm present. No murmur heard. Pulmonary/Chest: Effort normal and breath sounds normal. No stridor. No respiratory distress. Abdominal: Soft. Bowel sounds are normal. He exhibits no distension. There is no tenderness. Musculoskeletal: Normal range of motion. He exhibits no edema. Neurological: He is alert and oriented to person, place, and time.    Skin: Skin is warm and dry. Capillary refill takes less than 2 seconds. Psychiatric: He has a normal mood and affect. His behavior is normal.             Labs      Recent Labs     08/21/19  0012   WBC 8.3   HGB 10.3*   HCT 31.8*   *   MCV 90.5        Recent Labs     08/21/19  0012 08/22/19  0352 08/23/19  0554   * 130* 130*   K 3.5 4.1 4.0   CL 92* 95* 94*   CO2 25 25 26   BUN 17 16 13   CREATININE 0.9 0.9 0.8       No results for input(s): AST, ALT, ALB, BILIDIR, BILITOT, ALKPHOS in the last 72 hours. Recent Labs     08/21/19  0012   MG 1.70*       Radiology  CT HEAD WO CONTRAST   Final Result      1. No evidence of recent intracranial hemorrhage. 2. Parenchymal volume loss and chronic small vessel ischemic changes in the white matter. VL DUP CAROTID BILATERAL         CTA NECK W CONTRAST   Final Result   1. This exam is degraded by poor contrast bolus/timing and extensive    beam hardening artifact from dental amalgam.  Evaluation of the carotid    bulbs and proximal internal carotid arteries is essentially nondiagnostic. There is otherwise no significant steno-occlusive lesion in the neck. 2.  Patchy marrow attenuation diffusely is nonspecific. Please correlate    with any history of cancer. CTA HEAD W CONTRAST   Final Result     No significant steno-occlusive lesion in the head. XR CHEST PORTABLE   Final Result   Cardiomegaly. No acute pulmonary findings. CT Cervical Spine WO Contrast   Final Result     No acute fracture. Anterolisthesis at C3-4 measuring 2 mm and at C4-5 measuring 2 mm is    likely due to degenerative disc disease. Moderate cervical spondylosis. CT Head WO Contrast   Final Result     No acute CT findings in the head/brain. Assessment and Plan:    Active Problems:    Acute CVA (cerebrovascular accident) ([address]    Hyponatremia    Atrial fibrillation ([address]    Chronic systolic heart failure ([address]  Resolved Problems:    * No

## 2019-08-23 NOTE — PROGRESS NOTES
with RVR ([facility] [address] was also pertinent to this visit. has a past medical history of Arthritis, Back pain, CAD (coronary artery disease), Cancer ([facility] [address] Diabetes mellitus ([facility] [address] Hyperlipidemia, Hypertension, and Rosacea. has a past surgical history that includes Coronary angioplasty with stent; back surgery; joint replacement; shoulder surgery; eye surgery; Cataract removal with implant; Coronary artery bypass graft; Prostate surgery; Colonoscopy (9/21/11); and Colonoscopy (2/11/2015). Treatment Diagnosis: Impaired ADLs, mobility and activity tolerance      Restrictions  Position Activity Restriction  Other position/activity restrictions: up as tolerated. Knee immobilizer for  L LE at all times and WBAT per family(had TKA early August)    Subjective   General  Chart Reviewed: Yes  Additional Pertinent Hx: Pt is an 80 y.o. adm through ED with altered mental status, found on ground at NH with decreased responsiveness. Head CT: neg. Cspine CT: neg fracture, anterolisthesis C3-4 and C4-5. CXR: neg. CTA head: neg. Carotids <50% bilat. Repeat Head CT: pending. PMH: arthritis, CAD, prostate CA, DM, hyperlipidemia, HTN, back surgery, CABG, L TKR   Family / Caregiver Present: Yes(son)  Diagnosis: TIA vs CVA  Subjective  Subjective: Pt in bed upon entry, agreeable to OT evaluation. Patient Currently in Pain: Denies    Social/Functional History  Social/Functional History  Type of Home: Facility([facility])  ADL Assistance: Needs assistance  Homemaking Assistance: Needs assistance  Ambulation Assistance: Needs assistance(walking with walker at SNF)  Transfer Assistance: Needs assistance  Additional Comments: Fourth of July had TIA - went to [facility]. Had been back home after that until knee replacement 2 weeks ago. Has been at [facility] since knee replacement. Was getting PT/OT. Per son, pt was w/c bound prior to knee replacement and getting assist for transfers from wife.         Objective   Vision:

## 2019-08-23 NOTE — PROGRESS NOTES
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  No acute pulmonary findings.          CT of head 19  No acute CT findings in the head/brain.            Previous MBS    Chart reviewed. Medical Diagnosis: acute CVA  Treatment Diagnosis: dysphagia    BSE Impression 19  pt alert, oriented, follows commands and conveses approriately. No word finding or paraphasias noted during conversation. Pt spouse states speech resolved, pt states he doesn't recall having problems when admitted. Spouse reports this has happened 4 times and then speech resolves fully. Pt denies any swallowing issues at baseline. Presented pt with puree and thin liquids as well as a kimmy cracker. Pt demonstrated no overt signs of aspiration: no coughing/throat clearing or change in vocal quality. Good laryngeal elevation upon palpation of anterior neck.   Pt with no difficulty mastication cracker, no oral residue.     MBS results     Pain:     Current Diet : regular with thin liquids    Treatment:  Pt seen bedside to address the following goals:  1-The patient will tolerate recommended diet without observed clinical signs of aspiration    2- The patient Lucio Hu will verbalize/demonstrate understanding of dysphagia recommendations  : Educated pt to purpose of visit, s/s of aspiration, concern if aspiration occurs, rationale

## 2019-08-23 NOTE — DISCHARGE INSTR - COC
Continuity of Care Form    Patient Name: Melissa Sanchez   :  1933  MRN:  [id number]    Admit date:  2019  Discharge date:  ***    Code Status Order: Full Code   Advance Directives:   Advance Care Flowsheet Documentation     Date/Time Healthcare Directive Type of Healthcare Directive Copy in [address] Agent's Name Healthcare Agent's Phone Number    [phone]  Yes, patient has an advance directive for healthcare treatment  Durable power of  for health care;Living will  No, copy requested from family  Spouse  --  --          Admitting Physician:  Nathanael Morales MD  PCP: [address]    Discharging Nurse: [facility]/Room#: 5453/2165-19  Discharging Unit Phone Number: ***    Emergency Contact:   Extended Emergency Contact Information  Primary Emergency Contact: Karen Workman  Address: [address] [place] [address] [address]  Phone: [phone]  Relation: Spouse  Secondary Emergency Contact: [address] [place] [address] Phone: [phone]  Relation: Child    Past Surgical History:  Past Surgical History:   Procedure Laterality Date    BACK SURGERY      CATARACT REMOVAL WITH IMPLANT      COLONOSCOPY  11    COLONOSCOPY  2015    polyp, diverticulosis    CORONARY ANGIOPLASTY WITH STENT PLACEMENT      CORONARY ARTERY BYPASS GRAFT      EYE SURGERY      JOINT REPLACEMENT      knee    PROSTATE SURGERY      seed implants    SHOULDER SURGERY         Immunization History: There is no immunization history on file for this patient.     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 T84.89XA    Acute CVA (cerebrovascular accident) ([facility] [facility] I63.9    Hyponatremia E87.1    Atrial fibrillation (HCC) I48.91    Chronic systolic heart failure (HCC) I50.22       Isolation/Infection: Therapies: {THERAPEUTIC INTERVENTION:5223529261}  Weight Bearing Status/Restrictions: [address]}  Other Medical Equipment (for information only, NOT a DME order):  {EQUIPMENT:132550345}  Other Treatments: ***    Patient's personal belongings (please select all that are sent with patient):  {CHP DME Belongings:310395904}    RN SIGNATURE:  {Esignature:[id number]}    CASE MANAGEMENT/SOCIAL WORK SECTION    Inpatient Status Date: 2019  Readmission Risk Assessment Score:  Readmission Risk              Risk of Unplanned Readmission:        13           Discharging to Facility/ Agency   · Name: [facility]  · Address:  · Phone:Mfmvey- [phone]  · MSE:[phone]    ·     / signature: Electronically signed by Oneyda Orellana RN on 19 at 5:53 PM    PHYSICIAN SECTION    Prognosis: Fair    Condition at Discharge: Stable    Rehab Potential (if transferring to Rehab):  Fair    Recommended Labs or Other Treatments After Discharge:   PTOT  Renal panel in 1 week       Physician Certification: I certify the above information and transfer of Tex Held  is necessary for the continuing treatment of the diagnosis listed and that he requires [facility] Update Admission H&P: No change in H&P    PHYSICIAN SIGNATURE:  Electronically signed by Kiara Noble MD on 19 at 11:25 AM

## 2019-08-23 NOTE — PROGRESS NOTES
Pt is alert and oriented times 3. Pt is disoriented  to time. NIh is a 3. Pt resting comfortably and denies any pain. All fall precautions in place. Will continue to monitor.

## 2019-08-23 NOTE — PROGRESS NOTES
Physical Therapy    Facility/Department: [facility]  Initial Assessment and Treatment    NAME: Lili Lowe  : 1933  MRN: [id number]    Date of Service: 2019    Discharge Recommendations:Shantanu Nieto scored a 17/24 on the AM-PAC short mobility form. Current research shows that an AM-PAC score of 17 or less is typically not associated with a discharge to the patient's home setting. Based on the patients AM-PAC score and their current functional mobility deficits, it is recommended that the patient have 3-5 sessions per week of Physical Therapy at d/c to increase the patients independence. PT Equipment Recommendations  Equipment Needed: (defer)    Assessment   Body structures, Functions, Activity limitations: Decreased functional mobility ; Decreased endurance;Decreased balance;Decreased strength;Decreased safe awareness  Assessment: Pt with decreased independent mobility s/p L TKR 2 weeks ago. Pt currently needing CG to mod assist for transfers and CG/min assist for gt. Pt needs cues for safety with transfers/gt. Not safe to ambulate alone. Rec cont skilled PT to maximize mobility and independence  Treatment Diagnosis: impaired functional mobility 2/2 decreased strength and endurance  Decision Making: Medium Complexity  Patient Education: Educated pt on role of PT, use of call light, importance of OOB, discharge rec. Pt verbalized understanding - may need reinforcement  REQUIRES PT FOLLOW UP: Yes       Patient Diagnosis(es): The primary encounter diagnosis was Altered mental status, unspecified altered mental status type. A diagnosis of Atrial fibrillation with RVR (HCC) was also pertinent to this visit. has a past medical history of Arthritis, Back pain, CAD (coronary artery disease), Cancer ([facility] [address] Diabetes mellitus ([facility] [address] Hyperlipidemia, Hypertension, and Rosacea.    has a past surgical history that includes Coronary angioplasty with stent; back surgery; joint replacement; shoulder surgery; eye surgery; Cataract removal with implant; Coronary artery bypass graft; Prostate surgery; Colonoscopy (9/21/11); and Colonoscopy (2/11/2015). Restrictions  Position Activity Restriction  Other position/activity restrictions: up as tolerated. Knee immobilizer for  L LE at all times and WBAT per family(had TKA early August)  Vision/Hearing        Subjective  General  Chart Reviewed: Yes  Additional Pertinent Hx: Pt is an 80 y.o. adm through ED with altered mental status, found on ground at NH with decreased responsiveness. Head CT: neg. Cspine CT: neg fracture, anterolisthesis C3-4 and C4-5. CXR: neg. CTA head: neg. Carotids <50% bilat. Repeat Head CT: pending. PMH: arthritis, CAD, prostate CA, DM, hyperlipidemia, HTN, back surgery, CABG, L TKR   Referring Practitioner: Yasmine Caplelan MD  Referral Date : 08/22/19  Diagnosis: Acute CVA  Subjective  Subjective: Pt found in supine. Agreeable to PT. Pain Screening  Patient Currently in Pain: Denies  Vital Signs  Patient Currently in Pain: Denies       Orientation  Orientation  Overall Orientation Status: Within Functional Limits(except month \"October\" )  Social/Functional History  Social/Functional History  Type of Home: Facility([facility])  ADL Assistance: Needs assistance  Homemaking Assistance: Needs assistance  Ambulation Assistance: Needs assistance(walking with walker at SNF)  Transfer Assistance: Needs assistance  Additional Comments: Fourth of July had TIA - went to [facility]. Had been back home after that until knee replacement 2 weeks ago. Has been at [facility] ([place] since knee replacement. Was getting PT/OT. Per son, pt was w/c bound prior to knee replacement and getting assist for transfers from wife.    Cognition        Objective          AROM RLE (degrees)  RLE AROM: WFL  AROM LLE (degrees)  LLE AROM : WFL(hip/ankle WFL, knee immobilized)  Strength RLE  Strength RLE: WFL  Strength LLE  Strength LLE: (able to SLR independently)        Bed mobility  Supine to Sit: Stand by assistance(HOB elevated, with rail)  Transfers  Sit to Stand: Minimal Assistance(from bed, CGA from elevated commode, min to mod assist from lower chair)  Stand to sit: Contact guard assistance(to chair and commode, min assist to chair after ambulating- cues for safety backing up)  Ambulation  Ambulation?: Yes  Ambulation 1  Device: Rolling Walker  Assistance: Contact guard assistance(initially, progressed to min assist as pt fatigued)  Quality of Gait: trunk flexed, step through gt pattern, heavy UE support on walker, near end of walk - pt more impulsive and pushing walker too far ahead needing cues for safety  Distance: 20', 60', 70'   Comments: seated rest between each walk            Treatment included: bed mobility, transfers, gt, pt education    Plan   Plan  Times per week: 2-5  Current Treatment Recommendations: Strengthening, Functional Mobility Training, Endurance Training, Balance Training, Gait Training, Patient/Caregiver Education & Training, Safety Education & Training  Safety Devices  Type of devices: Call light within reach, Chair alarm in place, Nurse notified, Left in chair    G-Code       OutComes Score                                                  AM-PAC Score  AM-PAC Inpatient Mobility Raw Score : 17 (08/23/19 0952)  AM-PAC Inpatient T-Scale Score : 42.13 (08/23/19 0952)  Mobility Inpatient CMS 0-100% Score: 50.57 (08/23/19 0952)  Mobility Inpatient CMS G-Code Modifier : CK (08/23/19 0952)          Goals  Short term goals  Time Frame for Short term goals: By discharge  Short term goal 1: Sup to sit modified independent  Short term goal 2: Pt will transfer sit to stand SBA  Short term goal 3: Pt will amb >100' with AD SBA       Therapy Time   Individual Concurrent Group Co-treatment   Time In 0852         Time Out 0935         Minutes 43               Timed Code Treatment Minutes: 28      Total Treatment Minutes:  43  If pt d/c'd

## 2019-08-24 LAB
ANION GAP SERPL CALCULATED.3IONS-SCNC: 13 MMOL/L (ref 3–16)
BUN BLDV-MCNC: 16 MG/DL (ref 7–20)
CALCIUM SERPL-MCNC: 9 MG/DL (ref 8.3–10.6)
CHLORIDE BLD-SCNC: 95 MMOL/L (ref 99–110)
CO2: 25 MMOL/L (ref 21–32)
CREAT SERPL-MCNC: 0.9 MG/DL (ref 0.8–1.3)
GFR AFRICAN AMERICAN: >60
GFR NON-AFRICAN AMERICAN: >60
GLUCOSE BLD-MCNC: 149 MG/DL (ref 70–99)
GLUCOSE BLD-MCNC: 153 MG/DL (ref 70–99)
GLUCOSE BLD-MCNC: 234 MG/DL (ref 70–99)
GLUCOSE BLD-MCNC: 257 MG/DL (ref 70–99)
GLUCOSE BLD-MCNC: 258 MG/DL (ref 70–99)
PERFORMED ON: ABNORMAL
POTASSIUM SERPL-SCNC: 4.7 MMOL/L (ref 3.5–5.1)
SODIUM BLD-SCNC: 133 MMOL/L (ref 136–145)

## 2019-08-24 PROCEDURE — 6370000000 HC RX 637 (ALT 250 FOR IP): Performed by: INTERNAL MEDICINE

## 2019-08-24 PROCEDURE — 1200000000 HC SEMI PRIVATE

## 2019-08-24 PROCEDURE — 6360000002 HC RX W HCPCS: Performed by: INTERNAL MEDICINE

## 2019-08-24 PROCEDURE — 80048 BASIC METABOLIC PNL TOTAL CA: CPT

## 2019-08-24 PROCEDURE — 99222 1ST HOSP IP/OBS MODERATE 55: CPT | Performed by: INTERNAL MEDICINE

## 2019-08-24 PROCEDURE — 2580000003 HC RX 258: Performed by: INTERNAL MEDICINE

## 2019-08-24 PROCEDURE — 36415 COLL VENOUS BLD VENIPUNCTURE: CPT

## 2019-08-24 RX ORDER — DIGOXIN 0.25 MG/ML
250 INJECTION INTRAMUSCULAR; INTRAVENOUS EVERY 4 HOURS
Status: COMPLETED | OUTPATIENT
Start: 2019-08-24 | End: 2019-08-24

## 2019-08-24 RX ORDER — POLYETHYLENE GLYCOL 3350 17 G/17G
17 POWDER, FOR SOLUTION ORAL DAILY
Status: DISCONTINUED | OUTPATIENT
Start: 2019-08-24 | End: 2019-08-25 | Stop reason: HOSPADM

## 2019-08-24 RX ORDER — LEVETIRACETAM 250 MG/1
250 TABLET ORAL 2 TIMES DAILY
Status: DISCONTINUED | OUTPATIENT
Start: 2019-08-24 | End: 2019-08-24

## 2019-08-24 RX ORDER — DIGOXIN 125 MCG
125 TABLET ORAL DAILY
Status: DISCONTINUED | OUTPATIENT
Start: 2019-08-25 | End: 2019-08-25 | Stop reason: HOSPADM

## 2019-08-24 RX ORDER — TRAZODONE HYDROCHLORIDE 50 MG/1
50 TABLET ORAL NIGHTLY
Status: DISCONTINUED | OUTPATIENT
Start: 2019-08-24 | End: 2019-08-25 | Stop reason: HOSPADM

## 2019-08-24 RX ADMIN — TRAZODONE HYDROCHLORIDE 50 MG: 50 TABLET ORAL at 21:24

## 2019-08-24 RX ADMIN — INSULIN LISPRO 2 UNITS: 100 INJECTION, SOLUTION INTRAVENOUS; SUBCUTANEOUS at 13:53

## 2019-08-24 RX ADMIN — FUROSEMIDE 20 MG: 20 TABLET ORAL at 08:10

## 2019-08-24 RX ADMIN — APIXABAN 5 MG: 5 TABLET, FILM COATED ORAL at 21:24

## 2019-08-24 RX ADMIN — INSULIN LISPRO 3 UNITS: 100 INJECTION, SOLUTION INTRAVENOUS; SUBCUTANEOUS at 17:09

## 2019-08-24 RX ADMIN — METOPROLOL TARTRATE 50 MG: 50 TABLET ORAL at 21:24

## 2019-08-24 RX ADMIN — Medication 10 ML: at 21:25

## 2019-08-24 RX ADMIN — INSULIN LISPRO 1 UNITS: 100 INJECTION, SOLUTION INTRAVENOUS; SUBCUTANEOUS at 08:08

## 2019-08-24 RX ADMIN — DIGOXIN 250 MCG: 0.25 INJECTION INTRAMUSCULAR; INTRAVENOUS at 16:59

## 2019-08-24 RX ADMIN — ATORVASTATIN CALCIUM 20 MG: 20 TABLET, FILM COATED ORAL at 08:11

## 2019-08-24 RX ADMIN — POLYETHYLENE GLYCOL 3350 17 G: 17 POWDER, FOR SOLUTION ORAL at 15:04

## 2019-08-24 RX ADMIN — APIXABAN 5 MG: 5 TABLET, FILM COATED ORAL at 08:10

## 2019-08-24 RX ADMIN — METOPROLOL TARTRATE 50 MG: 50 TABLET ORAL at 08:10

## 2019-08-24 RX ADMIN — LORAZEPAM 0.5 MG: 0.5 TABLET ORAL at 17:10

## 2019-08-24 RX ADMIN — TAMSULOSIN HYDROCHLORIDE 0.4 MG: 0.4 CAPSULE ORAL at 08:10

## 2019-08-24 RX ADMIN — INSULIN LISPRO 2 UNITS: 100 INJECTION, SOLUTION INTRAVENOUS; SUBCUTANEOUS at 21:26

## 2019-08-24 RX ADMIN — CARBIDOPA AND LEVODOPA 1 TABLET: 25; 100 TABLET ORAL at 21:25

## 2019-08-24 RX ADMIN — CARBIDOPA AND LEVODOPA 1 TABLET: 25; 100 TABLET ORAL at 14:12

## 2019-08-24 RX ADMIN — LORAZEPAM 0.5 MG: 0.5 TABLET ORAL at 08:15

## 2019-08-24 RX ADMIN — DIGOXIN 250 MCG: 0.25 INJECTION INTRAMUSCULAR; INTRAVENOUS at 10:27

## 2019-08-24 RX ADMIN — ASPIRIN 325 MG ORAL TABLET 325 MG: 325 PILL ORAL at 08:11

## 2019-08-24 ASSESSMENT — PAIN SCALES - GENERAL: PAINLEVEL_OUTOF10: 0

## 2019-08-24 NOTE — CONSULTS
Clinical Pharmacy Consult Note    80 y.o. male is admitted with Acute CVA (cerebrovascular accident). Pharmacy has been asked by Dr. Martine Smith to adjust all drips to normal saline as appropriate based on compatibility to avoid fluid shifts since D5 is osmotically active. The following intermittent IV drips/infusions have been adjusted to saline: No medication is ordered at this time. Should any of the following medications be needed, it must remain in D5W due to incompatibility with normal saline:  Amphotericin  Epinephrine  Mycophenolate  Nitroprusside  Penicillin G Potassium    Please be aware that patient has D5W ordered as part of hypoglycemia orderset. RPh will follow daily to ensure all new IVPBs + drips are in NS. Please call with questions.     Phoebe Severe, Rph
activity:     Days per week: Not on file     Minutes per session: Not on file    Stress: Not on file   Relationships    Social connections:     Talks on phone: Not on file     Gets together: Not on file     Attends Zoroastrian service: Not on file     Active member of club or organization: Not on file     Attends meetings of clubs or organizations: Not on file     Relationship status: Not on file    Intimate partner violence:     Fear of current or ex partner: Not on file     Emotionally abused: Not on file     Physically abused: Not on file     Forced sexual activity: Not on file   Other Topics Concern    Not on file   Social History Narrative    Not on file       Medications: Allergies   Allergen Reactions    Gabapentin     Morphine        Medications Prior to Admission: carbidopa-levodopa (SINEMET)  MG per tablet, Take 2 tablets by mouth 3 times daily  acetaminophen (TYLENOL) 325 MG tablet, Take 325 mg by mouth every 4 hours as needed for Pain  traMADol (ULTRAM) 50 MG tablet, Take 50 mg by mouth every 6 hours as needed for Pain. cetirizine (ZYRTEC) 10 MG tablet, Take 10 mg by mouth daily as needed for Allergies  ampicillin (PRINCIPEN) 500 MG capsule, Take 500 mg by mouth 4 times daily  cyanocobalamin 1000 MCG tablet, Take 1,000 mcg by mouth daily  DULoxetine (CYMBALTA) 60 MG extended release capsule, Take 60 mg by mouth daily  apixaban (ELIQUIS) 5 MG TABS tablet, Take 5 mg by mouth 2 times daily  sacubitril-valsartan (ENTRESTO) 24-26 MG per tablet, Take 1 tablet by mouth 2 times daily  Sodium Phosphates (FLEET) 7-19 GM/118ML, Place 1 enema rectally daily as needed  furosemide (LASIX) 20 MG tablet, Take 20 mg by mouth daily  insulin lispro (HUMALOG) 100 UNIT/ML injection vial, Inject into the skin 3 times daily (before meals)  tamsulosin (FLOMAX) 0.4 MG capsule, Take 0.4 mg by mouth daily. atorvastatin (LIPITOR) 20 MG tablet, Take 20 mg by mouth daily.     B-D ULTRAFINE III SHORT PEN 31G X 8 MM
dextrose         PRN:  LORazepam, sodium chloride flush, magnesium hydroxide, ondansetron, glucose, dextrose, glucagon (rDNA), dextrose    Allergy:     Gabapentin and Morphine       Review of Systems:     All 12 point review of symptoms completed. Pertinent positives identified in the HPI, all other review of symptoms negative as below. CONSTITUTIONAL: No fatigue  SKIN: No rash or pruritis. EYES: No visual changes or diplopia. No scleral icterus. ENT: No Headaches, hearing loss or vertigo. No mouth sores or sore throat. CARDIOVASCULAR: No chest pain/chest pressure/chest discomfort. No palpitations. No edema. RESPIRATORY: No dyspnea. No cough or wheezing, no sputum production. GASTROINTESTINAL: No N/V/D. No abdominal pain, appetite loss, blood in stools. GENITOURINARY: No dysuria, trouble voiding, or hematuria. MUSCULOSKELETAL:  No gait disturbance, weakness or joint complaints. NEUROLOGICAL: No headache, diplopia, change in muscle strength, numbness or tingling. No change in gait, balance, coordination, mood, affect, memory, mentation, behavior. ENDOCRINE: No excessive thirst, fluid intake, or urination. No tremor. HEMATOLOGIC: No abnormal bruising or bleeding. ALLERGY: No nasal congestion or hives.       Physical Examination:     Vitals:    08/24/19 1126 08/24/19 1408 08/24/19 1656 08/24/19 1705   BP: 133/79 121/77 (!) 129/51 132/82   Pulse: 85 90 83 86   Resp: 16 16     Temp: 98.7 °F (37.1 °C) 97.1 °F (36.2 °C)     TempSrc: Oral Oral     SpO2: 98% 96% 93%    Weight:       Height:           Wt Readings from Last 3 Encounters:   08/22/19 229 lb 15 oz (104.3 kg)   09/25/15 240 lb (108.9 kg)   02/11/15 237 lb (107.5 kg)         General Appearance:  Alert, cooperative, no distress, appears stated age Appropriate weight   Head:  Normocephalic, without obvious abnormality, atraumatic   Eyes:  PERRL, conjunctiva/corneas clear EOM intact  Ears normal   Throat no lesions       Nose: Nares normal, no drainage

## 2019-08-24 NOTE — PLAN OF CARE
Problem: Falls - Risk of:  Goal: Will remain free from falls  Description  Will remain free from falls  8/24/2019 0024 by Brinda Rivera RN  Outcome: Ongoing   Calls out appropriately. Bed locked in lowest position. Bed alarm on. Call light/belongings within reach. Will continue to monitor.

## 2019-08-24 NOTE — PROGRESS NOTES
Hospital Medicine Care  Progress Note      Chief complain; Altered Mental Status and Atrial Fibrillation (RVR)            Subjective:     Feels well  Ambulating in [address]  Afib with RVR this am  No nausea, emesis  Afebrile. Review of Systems:     Review of Systems as mentioned above, other ros is negative. Objective:       Medications        Scheduled Meds:   digoxin  250 mcg Intravenous Q4H    [START ON 8/25/2019] digoxin  125 mcg Oral Daily    metoprolol tartrate  50 mg Oral BID    aspirin  325 mg Oral Daily    atorvastatin  20 mg Oral Daily    tamsulosin  0.4 mg Oral Daily    sodium chloride flush  10 mL Intravenous 2 times per day    insulin lispro  0-6 Units Subcutaneous TID WC    insulin lispro  0-3 Units Subcutaneous Nightly    furosemide  20 mg Oral Daily    apixaban  5 mg Oral BID     Continuous Infusions:   dextrose       PRN Meds:. LORazepam, sodium chloride flush, magnesium hydroxide, ondansetron, glucose, dextrose, glucagon (rDNA), dextrose      Allergies  Allergies   Allergen Reactions    Gabapentin     Morphine          Vitals:    08/23/19 2300 08/24/19 0343 08/24/19 0724 08/24/19 1126   BP: 103/66 135/82 (!) 139/95 133/79   Pulse: 103 113 123 85   Resp: 14 14 16 16   Temp: 98.8 °F (37.1 °C) 99.8 °F (37.7 °C) 94 °F (34.4 °C) 98.7 °F (37.1 °C)   TempSrc: Oral Oral Oral Oral   SpO2: 97% 97% 94% 98%   Weight:       Height:             Physical exam:         Physical Exam   Constitutional: He is oriented to person, place, and time. He appears well-developed and well-nourished. HENT:   Head: Normocephalic and atraumatic. Cardiovascular: Normal rate. An irregular rhythm present. No murmur heard. Pulmonary/Chest: Effort normal and breath sounds normal. No stridor. No respiratory distress. Abdominal: Soft. Bowel sounds are normal. He exhibits no distension. There is no tenderness. Musculoskeletal: Normal range of motion. He exhibits no edema.    Neurological: He is alert and oriented to person, place, and time. Skin: Skin is warm and dry. Capillary refill takes less than 2 seconds. Psychiatric: He has a normal mood and affect. His behavior is normal.             Labs      No results for input(s): WBC, HGB, HCT, PLT, MCV in the last 72 hours. Recent Labs     08/22/19  0352 08/23/19  0554 08/24/19  0559   * 130* 133*   K 4.1 4.0 4.7   CL 95* 94* 95*   CO2 25 26 25   BUN 16 13 16   CREATININE 0.9 0.8 0.9       No results for input(s): AST, ALT, ALB, BILIDIR, BILITOT, ALKPHOS in the last 72 hours. No results for input(s): MG in the last 72 hours. Radiology  CT HEAD WO CONTRAST   Final Result      1. No evidence of recent intracranial hemorrhage. 2. Parenchymal volume loss and chronic small vessel ischemic changes in the white matter. VL DUP CAROTID BILATERAL   Final Result      CTA NECK W CONTRAST   Final Result   1. This exam is degraded by poor contrast bolus/timing and extensive    beam hardening artifact from dental amalgam.  Evaluation of the carotid    bulbs and proximal internal carotid arteries is essentially nondiagnostic. There is otherwise no significant steno-occlusive lesion in the neck. 2.  Patchy marrow attenuation diffusely is nonspecific. Please correlate    with any history of cancer. CTA HEAD W CONTRAST   Final Result     No significant steno-occlusive lesion in the head. XR CHEST PORTABLE   Final Result   Cardiomegaly. No acute pulmonary findings. CT Cervical Spine WO Contrast   Final Result     No acute fracture. Anterolisthesis at C3-4 measuring 2 mm and at C4-5 measuring 2 mm is    likely due to degenerative disc disease. Moderate cervical spondylosis. CT Head WO Contrast   Final Result     No acute CT findings in the head/brain. Assessment and Plan:    Active Problems:    Acute CVA (cerebrovascular accident) ([address]    Hyponatremia    Atrial fibrillation ([address] Chronic systolic heart failure (HCC)  Resolved Problems:    * No resolved hospital problems. *     80-year-old male presented with speech trouble and confusion, this has been a recurrent problem. Appears to be consistent with a complex partial seizure. Plan  Cannot obtain MRI given the pacemaker. EEG done. Start epileptic medications per neurology. On Lasix as patient hyponatremic. Improving. Continue home Eliquis. Chronic systolic heart failure appears to be well compensated. A. fib with RVR, continue BB, Cardiology consulted. Dig added.          Clifford Chen MD  8/24/2019

## 2019-08-24 NOTE — PROGRESS NOTES
Kane Bray MD    dextrose 5 % solution, 100 mL/hr, Intravenous, PRN, Kane Bray MD    insulin lispro (HUMALOG) injection pen 0-6 Units, 0-6 Units, Subcutaneous, TID WC, Kane Bray MD, 2 Units at 08/24/19 1353    insulin lispro (HUMALOG) injection pen 0-3 Units, 0-3 Units, Subcutaneous, Nightly, Kane Bray MD, 1 Units at 08/23/19 2007    furosemide (LASIX) tablet 20 mg, 20 mg, Oral, Daily, Lyle Mendoza MD, 20 mg at 08/24/19 0810    apixaban (ELIQUIS) tablet 5 mg, 5 mg, Oral, BID, Lyle Mendoza MD, 5 mg at 08/24/19 0810    ROS:  Constitutional- No weight loss or fevers  Eyes- No diplopia. No photophobia. Ears/nose/throat- No dysphagia. No Dysarthria  Cardiovascular- No palpitations. No chest pain  Respiratory- No dyspnea. No Cough  Gastrointestinal- No Abdominal pain. No Vomiting. Genitourinary- No incontinence. No urinary retention  Musculoskeletal- No myalgia. No arthralgia  Skin- No rash. No easy bruising. Psychiatric- No depression. No anxiety  Endocrine- No diabetes. No thyroid issues. Hematologic- No bleeding difficulty. No fatigue  Neurologic- No weakness. No Headache. General Examination :  Blood pressure 121/77, pulse 90, temperature 97.1 °F (36.2 °C), temperature source Oral, resp. rate 16, height 6' 1\" (1.854 m), weight 229 lb 15 oz (104.3 kg), SpO2 96 %. Constitutional    Vital signs: BP, HR, and RR reviewed   General appearance: Well built, well nourished, awake and alert  HEENT: Pupils equal, round, and reactive to light. Conjunctivae/corneas clear. Neck: Supple, with full range of motion. No jugular venous distention. Respiratory:  Breath sounds clear, no rhonchi. Cardiovascular: Regular rate and rhythm with normal S1/S2 without murmurs, rubs or gallops. Abdomen: Soft, non-tender, non-distended with normal bowel sounds. Musculoskeletal: No clubbing, cyanosis or edema bilaterally.     Skin: Skin color, texture, turgor normal.  No rashes

## 2019-08-25 VITALS
OXYGEN SATURATION: 96 % | WEIGHT: 229.94 LBS | TEMPERATURE: 98.2 F | HEART RATE: 89 BPM | BODY MASS INDEX: 30.47 KG/M2 | HEIGHT: 73 IN | RESPIRATION RATE: 16 BRPM | SYSTOLIC BLOOD PRESSURE: 136 MMHG | DIASTOLIC BLOOD PRESSURE: 78 MMHG

## 2019-08-25 LAB
ANION GAP SERPL CALCULATED.3IONS-SCNC: 11 MMOL/L (ref 3–16)
BUN BLDV-MCNC: 17 MG/DL (ref 7–20)
CALCIUM SERPL-MCNC: 8.9 MG/DL (ref 8.3–10.6)
CHLORIDE BLD-SCNC: 96 MMOL/L (ref 99–110)
CO2: 27 MMOL/L (ref 21–32)
CREAT SERPL-MCNC: 0.9 MG/DL (ref 0.8–1.3)
GFR AFRICAN AMERICAN: >60
GFR NON-AFRICAN AMERICAN: >60
GLUCOSE BLD-MCNC: 188 MG/DL (ref 70–99)
GLUCOSE BLD-MCNC: 194 MG/DL (ref 70–99)
GLUCOSE BLD-MCNC: 292 MG/DL (ref 70–99)
PERFORMED ON: ABNORMAL
PERFORMED ON: ABNORMAL
POTASSIUM SERPL-SCNC: 4.4 MMOL/L (ref 3.5–5.1)
SODIUM BLD-SCNC: 134 MMOL/L (ref 136–145)

## 2019-08-25 PROCEDURE — 6370000000 HC RX 637 (ALT 250 FOR IP): Performed by: INTERNAL MEDICINE

## 2019-08-25 PROCEDURE — 36415 COLL VENOUS BLD VENIPUNCTURE: CPT

## 2019-08-25 PROCEDURE — 80048 BASIC METABOLIC PNL TOTAL CA: CPT

## 2019-08-25 PROCEDURE — 2580000003 HC RX 258: Performed by: INTERNAL MEDICINE

## 2019-08-25 PROCEDURE — 99232 SBSQ HOSP IP/OBS MODERATE 35: CPT | Performed by: INTERNAL MEDICINE

## 2019-08-25 RX ORDER — CARBAMAZEPINE 100 MG/1
100 TABLET, EXTENDED RELEASE ORAL 2 TIMES DAILY
Qty: 60 TABLET | Refills: 3 | Status: SHIPPED | OUTPATIENT
Start: 2019-08-25 | End: 2020-02-28 | Stop reason: CLARIF

## 2019-08-25 RX ORDER — LORAZEPAM 1 MG/1
1 TABLET ORAL EVERY 6 HOURS PRN
Qty: 12 TABLET | Refills: 0 | Status: SHIPPED | OUTPATIENT
Start: 2019-08-25 | End: 2019-08-28

## 2019-08-25 RX ORDER — METOPROLOL SUCCINATE 100 MG/1
100 TABLET, EXTENDED RELEASE ORAL DAILY
Qty: 30 TABLET | Refills: 3 | Status: SHIPPED | OUTPATIENT
Start: 2019-08-26 | End: 2020-02-28 | Stop reason: CLARIF

## 2019-08-25 RX ORDER — CARBAMAZEPINE 100 MG/1
100 TABLET, EXTENDED RELEASE ORAL 2 TIMES DAILY
Status: DISCONTINUED | OUTPATIENT
Start: 2019-08-25 | End: 2019-08-25 | Stop reason: HOSPADM

## 2019-08-25 RX ORDER — METOPROLOL SUCCINATE 100 MG/1
100 TABLET, EXTENDED RELEASE ORAL DAILY
Status: DISCONTINUED | OUTPATIENT
Start: 2019-08-25 | End: 2019-08-25 | Stop reason: HOSPADM

## 2019-08-25 RX ORDER — TRAZODONE HYDROCHLORIDE 50 MG/1
50 TABLET ORAL NIGHTLY PRN
Qty: 6 TABLET | Refills: 0 | Status: SHIPPED | OUTPATIENT
Start: 2019-08-25 | End: 2020-02-28 | Stop reason: CLARIF

## 2019-08-25 RX ORDER — DIGOXIN 125 MCG
125 TABLET ORAL DAILY
Qty: 30 TABLET | Refills: 3 | Status: SHIPPED | OUTPATIENT
Start: 2019-08-26 | End: 2020-02-28 | Stop reason: CLARIF

## 2019-08-25 RX ADMIN — FUROSEMIDE 20 MG: 20 TABLET ORAL at 09:30

## 2019-08-25 RX ADMIN — LORAZEPAM 0.5 MG: 0.5 TABLET ORAL at 09:41

## 2019-08-25 RX ADMIN — POLYETHYLENE GLYCOL 3350 17 G: 17 POWDER, FOR SOLUTION ORAL at 09:30

## 2019-08-25 RX ADMIN — DIGOXIN 125 MCG: 125 TABLET ORAL at 09:30

## 2019-08-25 RX ADMIN — METOPROLOL SUCCINATE 100 MG: 100 TABLET, EXTENDED RELEASE ORAL at 09:30

## 2019-08-25 RX ADMIN — ATORVASTATIN CALCIUM 20 MG: 20 TABLET, FILM COATED ORAL at 09:30

## 2019-08-25 RX ADMIN — CARBIDOPA AND LEVODOPA 1 TABLET: 25; 100 TABLET ORAL at 09:30

## 2019-08-25 RX ADMIN — INSULIN LISPRO 3 UNITS: 100 INJECTION, SOLUTION INTRAVENOUS; SUBCUTANEOUS at 13:03

## 2019-08-25 RX ADMIN — CARBAMAZEPINE 100 MG: 100 TABLET, EXTENDED RELEASE ORAL at 13:24

## 2019-08-25 RX ADMIN — TAMSULOSIN HYDROCHLORIDE 0.4 MG: 0.4 CAPSULE ORAL at 09:30

## 2019-08-25 RX ADMIN — Medication 10 ML: at 10:17

## 2019-08-25 RX ADMIN — INSULIN LISPRO 1 UNITS: 100 INJECTION, SOLUTION INTRAVENOUS; SUBCUTANEOUS at 09:30

## 2019-08-25 RX ADMIN — APIXABAN 5 MG: 5 TABLET, FILM COATED ORAL at 09:29

## 2019-08-25 NOTE — PROGRESS NOTES
NEUROLOGY PROGRESS NOTE :    Subjective : No new issues overnight except for feeling more depressed with Keppra and according to the family and denies any headaches, vision changes, weakness, dizziness and wants to go home.     Current Facility-Administered Medications:     metoprolol succinate (TOPROL XL) extended release tablet 100 mg, 100 mg, Oral, Daily, Anuradha Alcantara MD, 100 mg at 08/25/19 0930    carBAMazepine (TEGRETOL XR) extended release tablet 100 mg, 100 mg, Oral, BID, Milton Wade MD    digoxin (LANOXIN) tablet 125 mcg, 125 mcg, Oral, Daily, Anuradha Alcantara MD, 125 mcg at 08/25/19 0930    carbidopa-levodopa (SINEMET)  MG per tablet 1 tablet, 1 tablet, Oral, TID, Milton Wade MD, 1 tablet at 08/25/19 0930    traZODone (DESYREL) tablet 50 mg, 50 mg, Oral, Nightly, Milton Wade MD, 50 mg at 08/24/19 2124    polyethylene glycol (GLYCOLAX) packet 17 g, 17 g, Oral, Daily, Milton Wade MD, 17 g at 08/25/19 0930    LORazepam (ATIVAN) tablet 0.5 mg, 0.5 mg, Oral, Q8H PRN, Milton Wade MD, 0.5 mg at 08/25/19 0941    atorvastatin (LIPITOR) tablet 20 mg, 20 mg, Oral, Daily, Nayely Ag MD, 20 mg at 08/25/19 0930    tamsulosin (FLOMAX) capsule 0.4 mg, 0.4 mg, Oral, Daily, Nayely Ag MD, 0.4 mg at 08/25/19 0930    sodium chloride flush 0.9 % injection 10 mL, 10 mL, Intravenous, 2 times per day, Nayely Ag MD, 10 mL at 08/25/19 1017    sodium chloride flush 0.9 % injection 10 mL, 10 mL, Intravenous, PRN, Nayely Ag MD    magnesium hydroxide (MILK OF MAGNESIA) 400 MG/5ML suspension 30 mL, 30 mL, Oral, Daily PRN, Nayely Ag MD    ondansetron (ZOFRAN) injection 4 mg, 4 mg, Intravenous, Q6H PRN, Nayely Ag MD    glucose (GLUTOSE) 40 % oral gel 15 g, 15 g, Oral, PRN, Nayely Ag MD    dextrose 50 % IV solution, 12.5 g, Intravenous, PRN, Shankar Willams, MD    glucagon (rDNA) injection 1 mg, 1 mg, Intramuscular, PRN, Mary Whitfield MD    dextrose 5 % solution, 100 mL/hr, Intravenous, PRN, Mary Whitfield MD    insulin lispro (HUMALOG) injection pen 0-6 Units, 0-6 Units, Subcutaneous, TID WC, Mary Whitfield MD, 1 Units at 08/25/19 0930    insulin lispro (HUMALOG) injection pen 0-3 Units, 0-3 Units, Subcutaneous, Nightly, Mary Whitfield MD, 2 Units at 08/24/19 2126    furosemide (LASIX) tablet 20 mg, 20 mg, Oral, Daily, Rockne Meckel, MD, 20 mg at 08/25/19 0930    apixaban (ELIQUIS) tablet 5 mg, 5 mg, Oral, BID, Rockne Meckel, MD, 5 mg at 08/25/19 0929    General Examination :  Constitutional    Vital signs: BP, HR, and RR reviewed  Blood pressure 130/87, pulse 91, temperature 98.5 °F (36.9 °C), temperature source Oral, resp. rate 17, height 6' 1\" (1.854 m), weight 229 lb 15 oz (104.3 kg), SpO2 98 %. General appearance: Well built, well nourished, awake and alert  HEENT: Pupils equal, round, and reactive to light. Conjunctivae/corneas clear. Neck: Supple, with full range of motion. No jugular venous distention. Respiratory:  Breath sounds clear, no rhonchi. Cardiovascular: Regular rate and rhythm with normal S1/S2 without murmurs, rubs or gallops. Abdomen: Soft, non-tender, non-distended with normal bowel sounds. Musculoskeletal: No clubbing, cyanosis or edema bilaterally. Skin: Skin color, texture, turgor normal.  No rashes or lesions. Neurologic Examination :  Mental status: Orientation to person, place and time. Comprehension, naming and repetition intact. Attention intact and following verbal commands well. Cranial nerves:   CN2: Visual fields intact w/o extinction on confrontational testing, pupils equal round, reactive to light and accomodation.   CN 3,4,6: Extraocular muscles intact,  CN5: Facial sensation intact,  CN7: Face symmetric without dysarthria,   CN8: Hearing grossly intact,  CN9: Palate movement

## 2019-08-25 NOTE — PROGRESS NOTES
Alert oriented times 4 , left leg brace on when out of for support / sp tkr on 8/8  At [facility] with dr Carole Duran , lulu removed on Wednesday at the office, ace dressing on clean dry and intact, positive dorsi flex , brisk cap refill, no pain, also respirations regular , no chest pain, plan to go to snf at [facility] per squad

## 2019-08-25 NOTE — PROGRESS NOTES
Patient alert and oriented x4. VSS. Ace wrap to LLE with immobilizer CDI. Neuro assessment without change. Voiding per urinal. Tolerating diet without complaints of n/v. Denies pain. Patient without any new complaints. In bed with alarm on and call light within reach.

## 2019-08-25 NOTE — DISCHARGE SUMMARY
Discharge Summary           Admitting Physician: Marguerite Silverio MD  Consults:  · Neurology   · Cardiology   · PT OT     Discharging Physician: Bertha Craig Diagnoses:   · Active Problems:  ·   Acute CVA (cerebrovascular accident) ([address]  ·   Hyponatremia  ·   Atrial fibrillation ([address]  ·   Chronic systolic heart failure ([address]  · Resolved Problems:  ·   * No resolved hospital problems. *  ·       HPI: 68-year-old male presented with a speech trouble    Brief hospital course:      Patient was admitted, he had recurrent speech troubles associated with the confusion, historically the stroke work-up has been negative multiple times. We cannot obtain an MRI given that he has a pacemaker which is not MRI compatible. Neurology was consulted, after discussion with the neurology it was felt that the patient is having complex partial seizures causing the similar symptoms. Neurology had initially recommended Keppra however patient has depression and has expressed suicidal ideations before however he is not suicidal right now. Neurology had recommended carbamazepine. Patient and family are made aware about the side effects associated with antiepileptic therapy and monitoring labs, I have suggested outpatient follow-up with Dr. Toni Rodarte. Patient has also had A. fib with RVR, which did not respond with increasing metoprolol however it responded to digoxin. Patient is anticoagulated with apixaban. Patient will be discharged on beta-blocker and digoxin for rate control. For depression we had consulted psychiatry, however they have not seen the patient, family wants to take him to the nursing home today and they want to do outpatient psychiatry follow-up. Patient denies any suicidal ideation, thoughts about hurting himself.               Physical Exam: /87   Pulse 91   Temp 98.5 °F (36.9 °C) (Oral)   Resp 17   Ht 6' 1\" (1.854 m)   Wt 229 lb 15 oz (104.3 kg)   SpO2 98%   BMI

## 2019-08-25 NOTE — CARE COORDINATION
Case Management Assessment            Discharge Note                    Date / Time of Note: 8/25/2019 12:12 PM                  Discharge Note Completed by: Sundar Cowden    Patient Name: Rosina Stock   YOB: 1933  Diagnosis: Acute CVA (cerebrovascular accident) [facility]) [I63.9]  Acute CVA (cerebrovascular accident) [facility]) [I63.9]   Date / Time: 8/21/2019 11:08 PM    Current PCP: Kade Birch patient: No    Hospitalization in the last 30 days: No    Advance Directives:  Code Status: Full Code  [place] DNR form completed and on chart: No    Financial:  Payor: Fani Montaño / Plan: MEDICARE PART A AND B / Product Type: *No Product type* /      Pharmacy:    [facility] [address] [place] [address] [phone] Denece Face [phone]  [address] [place] [address] Drive [address]  Phone: [phone] Fax: [phone]    [id number] [address] [phone] - f [phone]  [facility] [address] [place] [address]  Phone: [phone] Fax: [phone]      Assistance purchasing medications?: Potential Assistance Purchasing Medications: No  Assistance provided by Case Management: None at this time    Does patient want to participate in local refill/ meds to beds program?: Not Assessed    Meds To Beds General Rules:  1. Can ONLY be done Monday- Friday between 8:30am-5pm  2. Prescription(s) must be in pharmacy by 3pm to be filled same day  3. Copy of patient's insurance/ prescription drug card and patient face sheet must be sent along with the prescription(s)  4. Cost of Rx cannot be added to hospital bill. If financial assistance is needed, please contact unit  or ;  or  CANNOT provide pharmacy voucher for patients co-pays  5.  Patients can then  the prescription on their way out of the hospital at discharge, or pharmacy can deliver to the bedside if staff is available. (payment due

## 2019-08-25 NOTE — PROGRESS NOTES
Cardiology Consult Service  Daily Progress Note        Admit Date:  8/21/2019  Primary cardiologist: Dr Dariel Ariza, Dr Russ Pagan at [facility]    Reason for Consultation/Chief Complaint: CAF now with RVR    Subjective:      Jackie Herron is a 80 y.o. male with a past medical history of chronic atrial fibrillation on Eliquis for years, CAD status post CABG x2 in 1991 and then 8 stents (lastly 5 years ago), severe AS s/p TAVR in 10/2016 at [facility], single-lead ICD, HFrEF (EF 20-25%). He also has DM, Parkinson's disease, TIA's.      Echo 07/2019 at [facility]: Mild LV dilation, EF 20 to 25%, normal bioprosthetic aVR, moderate MR, RV dysfunction, moderate TR, bubble study negative.     Patient was found at his nursing facility confused with slurred speech. Neurology was consulted. CT and CTA head not show any acute abnormalities. EEG was reviewed and patient was started on Keppra low-dose for possible complex partial seizures. Cardiology was consulted today due to elevation of patient's heart rate. Interval history:  Patient's heart rate is better controlled after receiving digoxin bolus and increasing his beta-blocker. He reports no symptoms.     Objective:     Medications:   metoprolol succinate  100 mg Oral Daily    carBAMazepine  100 mg Oral BID    digoxin  125 mcg Oral Daily    carbidopa-levodopa  1 tablet Oral TID    traZODone  50 mg Oral Nightly    polyethylene glycol  17 g Oral Daily    atorvastatin  20 mg Oral Daily    tamsulosin  0.4 mg Oral Daily    sodium chloride flush  10 mL Intravenous 2 times per day    insulin lispro  0-6 Units Subcutaneous TID     insulin lispro  0-3 Units Subcutaneous Nightly    furosemide  20 mg Oral Daily    apixaban  5 mg Oral BID       IV drips:   dextrose         PRN:  LORazepam, sodium chloride flush, magnesium hydroxide, ondansetron, glucose, dextrose, glucagon (rDNA), dextrose    Vitals:    08/24/19 1924 08/24/19 2318 08/25/19 0330 08/25/19 0720   BP: 107/65 (!) 147/75

## 2019-08-25 NOTE — PLAN OF CARE
Problem: Falls - Risk of:  Goal: Will remain free from falls  Description  Will remain free from falls  8/25/2019 1239 by Danika Barriga RN  Outcome: Ongoing  Note:   Up to side of bed with stand by assist, with gait belt and walker , one person assist, no pain,   8/25/2019 0412 by Brittanie Rodríguez RN  Outcome: Ongoing     Problem: Discharge Planning:  Goal: Discharged to appropriate level of care  Description  Discharged to appropriate level of care  8/25/2019 0412 by Brittanie Rodríguez RN  Outcome: Ongoing

## 2019-08-27 LAB
BLOOD CULTURE, ROUTINE: NORMAL
CULTURE, BLOOD 2: NORMAL

## 2020-02-28 ENCOUNTER — APPOINTMENT (OUTPATIENT)
Dept: CT IMAGING | Age: 85
End: 2020-02-28
Payer: MEDICARE

## 2020-02-28 ENCOUNTER — HOSPITAL ENCOUNTER (EMERGENCY)
Age: 85
Discharge: HOME | End: 2020-02-28
Attending: EMERGENCY MEDICINE
Payer: MEDICARE

## 2020-02-28 ENCOUNTER — APPOINTMENT (OUTPATIENT)
Dept: GENERAL RADIOLOGY | Age: 85
End: 2020-02-28
Payer: MEDICARE

## 2020-02-28 VITALS
DIASTOLIC BLOOD PRESSURE: 62 MMHG | RESPIRATION RATE: 18 BRPM | SYSTOLIC BLOOD PRESSURE: 106 MMHG | TEMPERATURE: 97.6 F | WEIGHT: 215 LBS | OXYGEN SATURATION: 98 % | HEIGHT: 72 IN | BODY MASS INDEX: 29.12 KG/M2 | HEART RATE: 85 BPM

## 2020-02-28 LAB
ANION GAP SERPL CALCULATED.3IONS-SCNC: 13 MMOL/L (ref 3–16)
APTT: 40.3 SEC (ref 24.2–36.2)
BACTERIA: ABNORMAL /HPF
BASOPHILS ABSOLUTE: 0.1 K/UL (ref 0–0.2)
BASOPHILS RELATIVE PERCENT: 0.7 %
BILIRUBIN URINE: NEGATIVE
BLOOD, URINE: NEGATIVE
BUN BLDV-MCNC: 23 MG/DL (ref 7–20)
CALCIUM SERPL-MCNC: 9.5 MG/DL (ref 8.3–10.6)
CHLORIDE BLD-SCNC: 94 MMOL/L (ref 99–110)
CLARITY: CLEAR
CO2: 25 MMOL/L (ref 21–32)
COLOR: YELLOW
CREAT SERPL-MCNC: 1 MG/DL (ref 0.8–1.3)
EKG ATRIAL RATE: 90 BPM
EKG DIAGNOSIS: NORMAL
EKG Q-T INTERVAL: 392 MS
EKG QRS DURATION: 148 MS
EKG QTC CALCULATION (BAZETT): 471 MS
EKG R AXIS: -27 DEGREES
EKG T AXIS: 91 DEGREES
EKG VENTRICULAR RATE: 87 BPM
EOSINOPHILS ABSOLUTE: 0.1 K/UL (ref 0–0.6)
EOSINOPHILS RELATIVE PERCENT: 1.9 %
GFR AFRICAN AMERICAN: >60
GFR NON-AFRICAN AMERICAN: >60
GLUCOSE BLD-MCNC: 161 MG/DL (ref 70–99)
GLUCOSE URINE: NEGATIVE MG/DL
HCT VFR BLD CALC: 34.3 % (ref 40.5–52.5)
HEMOGLOBIN: 11.5 G/DL (ref 13.5–17.5)
INR BLD: 1.66 (ref 0.86–1.14)
KETONES, URINE: NEGATIVE MG/DL
LEUKOCYTE ESTERASE, URINE: ABNORMAL
LYMPHOCYTES ABSOLUTE: 0.9 K/UL (ref 1–5.1)
LYMPHOCYTES RELATIVE PERCENT: 12.3 %
MCH RBC QN AUTO: 29.7 PG (ref 26–34)
MCHC RBC AUTO-ENTMCNC: 33.5 G/DL (ref 31–36)
MCV RBC AUTO: 88.5 FL (ref 80–100)
MICROSCOPIC EXAMINATION: YES
MONOCYTES ABSOLUTE: 0.6 K/UL (ref 0–1.3)
MONOCYTES RELATIVE PERCENT: 8.4 %
NEUTROPHILS ABSOLUTE: 5.7 K/UL (ref 1.7–7.7)
NEUTROPHILS RELATIVE PERCENT: 76.7 %
NITRITE, URINE: NEGATIVE
PDW BLD-RTO: 18.4 % (ref 12.4–15.4)
PH UA: 8 (ref 5–8)
PLATELET # BLD: 117 K/UL (ref 135–450)
PMV BLD AUTO: 6.9 FL (ref 5–10.5)
POTASSIUM REFLEX MAGNESIUM: 4.3 MMOL/L (ref 3.5–5.1)
PRO-BNP: 1697 PG/ML (ref 0–449)
PROTEIN UA: ABNORMAL MG/DL
PROTHROMBIN TIME: 19.4 SEC (ref 10–13.2)
RBC # BLD: 3.87 M/UL (ref 4.2–5.9)
RBC UA: ABNORMAL /HPF (ref 0–4)
SODIUM BLD-SCNC: 132 MMOL/L (ref 136–145)
SPECIFIC GRAVITY UA: 1.02 (ref 1–1.03)
TROPONIN: 0.03 NG/ML
TROPONIN: 0.04 NG/ML
URINE TYPE: ABNORMAL
UROBILINOGEN, URINE: 0.2 E.U./DL
WBC # BLD: 7.5 K/UL (ref 4–11)
WBC UA: ABNORMAL /HPF (ref 0–5)

## 2020-02-28 PROCEDURE — 87086 URINE CULTURE/COLONY COUNT: CPT

## 2020-02-28 PROCEDURE — 6370000000 HC RX 637 (ALT 250 FOR IP): Performed by: PHYSICIAN ASSISTANT

## 2020-02-28 PROCEDURE — 93005 ELECTROCARDIOGRAM TRACING: CPT | Performed by: PHYSICIAN ASSISTANT

## 2020-02-28 PROCEDURE — 87186 SC STD MICRODIL/AGAR DIL: CPT

## 2020-02-28 PROCEDURE — 70450 CT HEAD/BRAIN W/O DYE: CPT

## 2020-02-28 PROCEDURE — 70498 CT ANGIOGRAPHY NECK: CPT

## 2020-02-28 PROCEDURE — 6360000002 HC RX W HCPCS: Performed by: PHYSICIAN ASSISTANT

## 2020-02-28 PROCEDURE — 85610 PROTHROMBIN TIME: CPT

## 2020-02-28 PROCEDURE — 80048 BASIC METABOLIC PNL TOTAL CA: CPT

## 2020-02-28 PROCEDURE — 99285 EMERGENCY DEPT VISIT HI MDM: CPT

## 2020-02-28 PROCEDURE — 85025 COMPLETE CBC W/AUTO DIFF WBC: CPT

## 2020-02-28 PROCEDURE — 6360000004 HC RX CONTRAST MEDICATION: Performed by: EMERGENCY MEDICINE

## 2020-02-28 PROCEDURE — 71045 X-RAY EXAM CHEST 1 VIEW: CPT

## 2020-02-28 PROCEDURE — 83880 ASSAY OF NATRIURETIC PEPTIDE: CPT

## 2020-02-28 PROCEDURE — 84484 ASSAY OF TROPONIN QUANT: CPT

## 2020-02-28 PROCEDURE — 85730 THROMBOPLASTIN TIME PARTIAL: CPT

## 2020-02-28 PROCEDURE — 87077 CULTURE AEROBIC IDENTIFY: CPT

## 2020-02-28 PROCEDURE — 2580000003 HC RX 258: Performed by: PHYSICIAN ASSISTANT

## 2020-02-28 PROCEDURE — 36415 COLL VENOUS BLD VENIPUNCTURE: CPT

## 2020-02-28 PROCEDURE — 81001 URINALYSIS AUTO W/SCOPE: CPT

## 2020-02-28 RX ORDER — LISINOPRIL 20 MG/1
20 TABLET ORAL DAILY
Status: ON HOLD | COMMUNITY
End: 2020-10-17

## 2020-02-28 RX ORDER — NITROGLYCERIN 0.4 MG/1
0.4 TABLET SUBLINGUAL EVERY 5 MIN PRN
Status: ON HOLD | COMMUNITY
End: 2020-10-17

## 2020-02-28 RX ORDER — METFORMIN HYDROCHLORIDE 500 MG/1
500 TABLET, EXTENDED RELEASE ORAL 2 TIMES DAILY WITH MEALS
Status: ON HOLD | COMMUNITY
End: 2020-10-17

## 2020-02-28 RX ORDER — METOPROLOL TARTRATE 50 MG/1
25 TABLET, FILM COATED ORAL DAILY
Status: ON HOLD | COMMUNITY
End: 2020-11-30 | Stop reason: HOSPADM

## 2020-02-28 RX ORDER — CLONIDINE HYDROCHLORIDE 0.1 MG/1
0.1 TABLET ORAL 2 TIMES DAILY
Status: ON HOLD | COMMUNITY
End: 2020-10-17

## 2020-02-28 RX ORDER — CLOPIDOGREL BISULFATE 75 MG/1
75 TABLET ORAL DAILY
Status: ON HOLD | COMMUNITY
End: 2020-10-17

## 2020-02-28 RX ORDER — ALBUTEROL SULFATE 2.5 MG/3ML
2.5 SOLUTION RESPIRATORY (INHALATION) EVERY 6 HOURS PRN
Status: ON HOLD | COMMUNITY
End: 2020-10-17

## 2020-02-28 RX ORDER — GUAIFENESIN AND DEXTROMETHORPHAN HYDROBROMIDE 100; 10 MG/5ML; MG/5ML
10 SOLUTION ORAL EVERY 6 HOURS PRN
Status: ON HOLD | COMMUNITY
End: 2020-10-17

## 2020-02-28 RX ORDER — LEVOTHYROXINE SODIUM 0.05 MG/1
50 TABLET ORAL
Status: ON HOLD | COMMUNITY
End: 2020-10-17

## 2020-02-28 RX ORDER — NICOTINE 21 MG/24HR
1 PATCH, TRANSDERMAL 24 HOURS TRANSDERMAL EVERY 24 HOURS
Status: ON HOLD | COMMUNITY
End: 2020-10-17

## 2020-02-28 RX ORDER — CEPHALEXIN 500 MG/1
500 CAPSULE ORAL 4 TIMES DAILY
Qty: 28 CAPSULE | Refills: 0 | Status: SHIPPED | OUTPATIENT
Start: 2020-02-28 | End: 2020-03-06

## 2020-02-28 RX ORDER — ACETAMINOPHEN 500 MG
1000 TABLET ORAL ONCE
Status: COMPLETED | OUTPATIENT
Start: 2020-02-28 | End: 2020-02-28

## 2020-02-28 RX ORDER — LORAZEPAM 1 MG/1
.5-1 TABLET ORAL NIGHTLY PRN
Status: ON HOLD | COMMUNITY
End: 2020-11-23 | Stop reason: ALTCHOICE

## 2020-02-28 RX ORDER — AMOXICILLIN AND CLAVULANATE POTASSIUM 875; 125 MG/1; MG/1
1 TABLET, FILM COATED ORAL 2 TIMES DAILY
COMMUNITY
Start: 2020-02-26 | End: 2020-03-06

## 2020-02-28 RX ORDER — ISOSORBIDE MONONITRATE 30 MG/1
30 TABLET, EXTENDED RELEASE ORAL DAILY
Status: ON HOLD | COMMUNITY
End: 2020-10-17

## 2020-02-28 RX ORDER — GLIPIZIDE 5 MG/1
20 TABLET ORAL
Status: ON HOLD | COMMUNITY
End: 2020-10-17

## 2020-02-28 RX ADMIN — IOPAMIDOL 80 ML: 755 INJECTION, SOLUTION INTRAVENOUS at 10:35

## 2020-02-28 RX ADMIN — CEFTRIAXONE 1 G: 1 INJECTION, POWDER, FOR SOLUTION INTRAMUSCULAR; INTRAVENOUS at 11:20

## 2020-02-28 RX ADMIN — ACETAMINOPHEN 1000 MG: 500 TABLET ORAL at 11:57

## 2020-02-28 ASSESSMENT — PAIN SCALES - GENERAL: PAINLEVEL_OUTOF10: 5

## 2020-02-28 NOTE — ED PROVIDER NOTES
[address] [facility] ENCOUNTER          PHYSICIAN ASSISTANT NOTE       Date of evaluation: 2/28/2020    Chief Complaint     Altered Mental Status and Fever    History of Present Illness     Tiana Timmons is a 80 y.o. male who presents with chief complaint of altered mental status. Patient himself is unable to provide history of what occurred today. Patient's wife provides report. She states that he went to bed last night in normal health without any concerns (although possibly had a fever of 100 prior to bed last night). She is reports that they live on separate floors at their nursing facility and she woke up this morning at 0730 to a text from the patient that was \"jibberish\". Reports that she went down to his room where she found him in his recliner. Some questions that  he would answer made sense, although on other questions he would be speaking nonsense. She states that he then had an episode of his arms flailing back-and-forth, at which time she ran out of the room to grab nursing staff. She is unsure if he maintained consciousness during this, does not know if his legs were making the same movements. She reports that he does have a history of possible seizures, although they have been unable to be fully explored as patient cannot have an MRI secondary to pacemaker. Patient is currently only complaining of a headache, denies any other concerns. Review of Systems     Review of Systems   Unable to perform ROS: Mental status change     Past Medical, Surgical, Family, and Social History     He has a past medical history of Arthritis, Back pain, CAD (coronary artery disease), Cancer ([address] Diabetes mellitus ([address] Hyperlipidemia, Hypertension, and Rosacea. He has a past surgical history that includes Coronary angioplasty with stent; back surgery; joint replacement; shoulder surgery; eye surgery;  Cataract removal with implant; Coronary artery bypass graft; Prostate surgery; (AUGMENTIN) 875-125 MG per tablet Take 1 tablet by mouth 2 times daily For 10 days then stopHistorical Med      Dextromethorphan-guaiFENesin  MG/5ML SYRP Take 10 mLs by mouth every 6 hours as needed for CoughHistorical Med      albuterol sulfate  (90 Base) MCG/ACT inhaler Inhale 2 puffs into the lungs every 4 hours as needed for Wheezing Historical Med      magnesium hydroxide (MILK OF MAGNESIA) 400 MG/5ML suspension Take 30 mLs by mouth daily as needed for ConstipationHistorical Med      polyethylene glycol (GLYCOLAX) packet Take 17 g by mouth daily as needed Historical Med      acetaminophen (TYLENOL) 325 MG tablet Take 325 mg by mouth every 6 hours as needed for Pain Historical Med      furosemide (LASIX) 40 MG tablet Take 40 mg by mouth 2 times daily Historical Med      atorvastatin (LIPITOR) 40 MG tablet Take 40 mg by mouth nightly Historical Med             Allergies     He is allergic to gabapentin and morphine. Physical Exam     INITIAL VITALS: BP: 110/76, Temp: 97.6 °F (36.4 °C), Pulse: 85, Resp: 18, SpO2: 90 %  Physical Exam  Vitals signs and nursing note reviewed. Constitutional:       General: He is not in acute distress. Appearance: He is well-developed. He is not diaphoretic. HENT:      Head: Normocephalic and atraumatic. Eyes:      Conjunctiva/sclera: Conjunctivae normal.      Pupils: Pupils are equal, round, and reactive to light. Neck:      Musculoskeletal: Normal range of motion. Cardiovascular:      Rate and Rhythm: Normal rate and regular rhythm. Heart sounds: Normal heart sounds. No murmur. No friction rub. Pulmonary:      Effort: Pulmonary effort is normal. No respiratory distress. Breath sounds: Normal breath sounds. No wheezing or rales. Abdominal:      Palpations: Abdomen is soft. Tenderness: There is no abdominal tenderness. There is no guarding or rebound. Musculoskeletal: Normal range of motion. Skin:     General: Skin is warm and dry. Hematocrit 34.3 (L) 40.5 - 52.5 %    MCV 88.5 80.0 - 100.0 fL    MCH 29.7 26.0 - 34.0 pg    MCHC 33.5 31.0 - 36.0 g/dL    RDW 18.4 (H) 12.4 - 15.4 %    Platelets 405 (L) 037 - 450 K/uL    MPV 6.9 5.0 - 10.5 fL    Neutrophils % 76.7 %    Lymphocytes % 12.3 %    Monocytes % 8.4 %    Eosinophils % 1.9 %    Basophils % 0.7 %    Neutrophils Absolute 5.7 1.7 - 7.7 K/uL    Lymphocytes Absolute 0.9 (L) 1.0 - 5.1 K/uL    Monocytes Absolute 0.6 0.0 - 1.3 K/uL    Eosinophils Absolute 0.1 0.0 - 0.6 K/uL    Basophils Absolute 0.1 0.0 - 0.2 K/uL   Basic Metabolic Panel w/ Reflex to MG   Result Value Ref Range    Sodium 132 (L) 136 - 145 mmol/L    Potassium reflex Magnesium 4.3 3.5 - 5.1 mmol/L    Chloride 94 (L) 99 - 110 mmol/L    CO2 25 21 - 32 mmol/L    Anion Gap 13 3 - 16    Glucose 161 (H) 70 - 99 mg/dL    BUN 23 (H) 7 - 20 mg/dL    CREATININE 1.0 0.8 - 1.3 mg/dL    GFR Non-African American >60 >60    GFR African American >60 >60    Calcium 9.5 8.3 - 10.6 mg/dL   Troponin   Result Value Ref Range    Troponin 0.03 (H) <0.01 ng/mL   Brain Natriuretic Peptide   Result Value Ref Range    Pro-BNP 1,697 (H) 0 - 449 pg/mL   Protime-INR   Result Value Ref Range    Protime 19.4 (H) 10.0 - 13.2 sec    INR 1.66 (H) 0.86 - 1.14   APTT   Result Value Ref Range    aPTT 40.3 (H) 24.2 - 36.2 sec   Urinalysis, reflex to microscopic   Result Value Ref Range    Color, UA Yellow Straw/Yellow    Clarity, UA Clear Clear    Glucose, Ur Negative Negative mg/dL    Bilirubin Urine Negative Negative    Ketones, Urine Negative Negative mg/dL    Specific Gravity, UA 1.020 1.005 - 1.030    Blood, Urine Negative Negative    pH, UA 8.0 5.0 - 8.0    Protein, UA TRACE (A) Negative mg/dL    Urobilinogen, Urine 0.2 <2.0 E.U./dL    Nitrite, Urine Negative Negative    Leukocyte Esterase, Urine MODERATE (A) Negative    Microscopic Examination YES     Urine Type NotGiven    Microscopic Urinalysis   Result Value Ref Range    WBC, UA 21-50 (A) 0 - 5 /HPF    RBC, UA prophylactically placed on carbamazepine. As patient woke up with symptoms and is already on Eliquis, not a TPA candidate. CT, CTA as were ordered not as code stroke. These resulted unremarkable without acute hemorrhage or any other concerning findings rather than chronic changes. CBC without leukocytosis, hemoglobin of 11.5. BMP with sodium of 132, chloride of 94, BUN of 23, normal creatinine. BNP of 1697. Initial troponin is mildly elevated at 0.03. EKG is nonischemic. Chest x-ray without acute findings. Urinalysis is pertinent for urinary tract infection with moderate leukocytes and 21-50 white blood cells. Patient was provided with 1 dose of Rocephin IV and urine culture was obtained. Repeat troponin resulted and was again mildly elevated 0.04. Patient is not having any chest pain, shortness of breath, nausea, vomiting, or any other concerning ACS findings. After long discussion with family, they would prefer patient be discharged back to his nursing facility as there are caretakers there they can appropriately give him medications. They would like to avoid another admission that would further increase patient's confusion. Patient's speaking has significantly improved throughout his time in the emergency department, is able to answer extended questions without difficulty. Continues to deny any chest pain. Discussed with family members the elevation in troponin and they report that they feel safe taking patient back to his nursing facility despite this. Return precautions including any development of chest pain or shortness of breath were thoroughly discussed. Also encourage return precautions for any other change in mental status or any other concerns, report understanding. Patient will be provided with a prescription for Keflex for ongoing treatment of his urinary tract infection. This patient was also evaluated by the attending physician.  All care plans were discussed and agreed

## 2020-03-01 LAB
ORGANISM: ABNORMAL
URINE CULTURE, ROUTINE: ABNORMAL

## 2020-06-30 ENCOUNTER — HOSPITAL ENCOUNTER (EMERGENCY)
Age: 85
Discharge: HOME | End: 2020-06-30
Attending: EMERGENCY MEDICINE
Payer: MEDICARE

## 2020-06-30 ENCOUNTER — APPOINTMENT (OUTPATIENT)
Dept: CT IMAGING | Age: 85
End: 2020-06-30
Payer: MEDICARE

## 2020-06-30 VITALS
DIASTOLIC BLOOD PRESSURE: 75 MMHG | HEIGHT: 72 IN | RESPIRATION RATE: 15 BRPM | TEMPERATURE: 97.5 F | HEART RATE: 99 BPM | WEIGHT: 208 LBS | OXYGEN SATURATION: 98 % | BODY MASS INDEX: 28.17 KG/M2 | SYSTOLIC BLOOD PRESSURE: 128 MMHG

## 2020-06-30 PROCEDURE — 90471 IMMUNIZATION ADMIN: CPT | Performed by: EMERGENCY MEDICINE

## 2020-06-30 PROCEDURE — 99284 EMERGENCY DEPT VISIT MOD MDM: CPT

## 2020-06-30 PROCEDURE — 70450 CT HEAD/BRAIN W/O DYE: CPT

## 2020-06-30 PROCEDURE — 2500000003 HC RX 250 WO HCPCS: Performed by: EMERGENCY MEDICINE

## 2020-06-30 PROCEDURE — 6370000000 HC RX 637 (ALT 250 FOR IP): Performed by: EMERGENCY MEDICINE

## 2020-06-30 PROCEDURE — 72125 CT NECK SPINE W/O DYE: CPT

## 2020-06-30 PROCEDURE — 6360000002 HC RX W HCPCS: Performed by: EMERGENCY MEDICINE

## 2020-06-30 PROCEDURE — 12001 RPR S/N/AX/GEN/TRNK 2.5CM/<: CPT

## 2020-06-30 PROCEDURE — 90715 TDAP VACCINE 7 YRS/> IM: CPT | Performed by: EMERGENCY MEDICINE

## 2020-06-30 RX ORDER — ACETAMINOPHEN 500 MG
1000 TABLET ORAL ONCE
Status: COMPLETED | OUTPATIENT
Start: 2020-06-30 | End: 2020-06-30

## 2020-06-30 RX ORDER — BACITRACIN ZINC AND POLYMYXIN B SULFATE 500; 1000 [USP'U]/G; [USP'U]/G
OINTMENT TOPICAL ONCE
Status: COMPLETED | OUTPATIENT
Start: 2020-06-30 | End: 2020-06-30

## 2020-06-30 RX ORDER — LIDOCAINE HYDROCHLORIDE AND EPINEPHRINE 10; 10 MG/ML; UG/ML
20 INJECTION, SOLUTION INFILTRATION; PERINEURAL ONCE
Status: COMPLETED | OUTPATIENT
Start: 2020-06-30 | End: 2020-06-30

## 2020-06-30 RX ADMIN — TETANUS TOXOID, REDUCED DIPHTHERIA TOXOID AND ACELLULAR PERTUSSIS VACCINE, ADSORBED 0.5 ML: 5; 2.5; 8; 8; 2.5 SUSPENSION INTRAMUSCULAR at 08:39

## 2020-06-30 RX ADMIN — BACITRACIN ZINC AND POLYMYXIN B SULFATE: at 11:10

## 2020-06-30 RX ADMIN — LIDOCAINE HYDROCHLORIDE,EPINEPHRINE BITARTRATE 20 ML: 10; .01 INJECTION, SOLUTION INFILTRATION; PERINEURAL at 09:40

## 2020-06-30 RX ADMIN — Medication 1000 MG: at 08:38

## 2020-06-30 ASSESSMENT — PAIN DESCRIPTION - DESCRIPTORS: DESCRIPTORS: ACHING

## 2020-06-30 ASSESSMENT — PAIN DESCRIPTION - PAIN TYPE: TYPE: ACUTE PAIN

## 2020-06-30 ASSESSMENT — PAIN SCALES - GENERAL
PAINLEVEL_OUTOF10: 3
PAINLEVEL_OUTOF10: 5
PAINLEVEL_OUTOF10: 5

## 2020-06-30 ASSESSMENT — PAIN DESCRIPTION - LOCATION: LOCATION: HEAD

## 2020-06-30 NOTE — ED PROVIDER NOTES
[address] [facility]          ATTENDING PHYSICIAN NOTE       Date of evaluation: 6/30/2020    Chief Complaint     Fall; Head Injury; and Laceration      History of Present Illness     Reggie Hussein is a 80 y.o. male who presents with head laceration after a fall. The patient has chronic right knee pain and walks with a walker. This morning he was crossing the room with his walker when both of his knees gave out from under him. It is pretty common for his right knee to give out from under him. He fell and hit his head but did not lose consciousness. Currently complains of pain at the back of his head. States that it is becoming more and more achy. Denies neck and back pain. Denies chest and abdomen pain. Reports typical pain in his right knee. No other extremity pain. No other aggravating relieving factors or associated symptoms. He does not take a blood thinner. Review of Systems     Positive for headache. Negative for fever, vomiting, chest pain, shortness breath, abdominal pain. All other systems were reviewed and are negative, except as mentioned in HPI. Past Medical, Surgical, Family, and Social History     He has a past medical history of Arthritis, Back pain, CAD (coronary artery disease), Cancer ([facility] [address] Diabetes mellitus ([facility] [address] Hyperlipidemia, Hypertension, and Rosacea. He has a past surgical history that includes Coronary angioplasty with stent; back surgery; joint replacement; shoulder surgery; eye surgery; Cataract removal with implant; Coronary artery bypass graft; Prostate surgery; Colonoscopy (9/21/11); and Colonoscopy (2/11/2015). His family history includes Cancer in his mother and sister. He reports that he has never smoked. He does not have any smokeless tobacco history on file. He reports that he does not drink alcohol or use drugs.     Medications     Previous Medications    ACETAMINOPHEN (TYLENOL) 325 MG TABLET    Take 325 mg by mouth every 6 hours as needed for Pain     ALBUTEROL (PROVENTIL) (2.5 MG/3ML) 0.083% NEBULIZER SOLUTION    Take 2.5 mg by nebulization every 6 hours as needed for Wheezing    ALBUTEROL SULFATE  (90 BASE) MCG/ACT INHALER    Inhale 2 puffs into the lungs every 4 hours as needed for Wheezing     ATORVASTATIN (LIPITOR) 40 MG TABLET    Take 40 mg by mouth nightly     CLONIDINE (CATAPRES) 0.1 MG TABLET    Take 0.1 mg by mouth 2 times daily    CLOPIDOGREL (PLAVIX) 75 MG TABLET    Take 75 mg by mouth daily    DEXTROMETHORPHAN-GUAIFENESIN  MG/5ML SYRP    Take 10 mLs by mouth every 6 hours as needed for Cough    FUROSEMIDE (LASIX) 40 MG TABLET    Take 40 mg by mouth 2 times daily     GLIPIZIDE (GLUCOTROL) 5 MG TABLET    Take 20 mg by mouth daily (with breakfast)    INSULIN ASPART (NOVOLOG) 100 UNIT/ML INJECTION VIAL    Inject 0-10 Units into the skin 4 times daily (before meals and nightly) Per sliding scale    ISOSORBIDE MONONITRATE (IMDUR) 30 MG EXTENDED RELEASE TABLET    Take 30 mg by mouth daily    LEVOTHYROXINE (SYNTHROID) 50 MCG TABLET    Take 50 mcg by mouth every morning (before breakfast)    LISINOPRIL (PRINIVIL;ZESTRIL) 20 MG TABLET    Take 20 mg by mouth daily    LORAZEPAM (ATIVAN) 1 MG TABLET    Take 1 mg by mouth every 8 hours as needed for Anxiety. MAGNESIUM HYDROXIDE (MILK OF MAGNESIA) 400 MG/5ML SUSPENSION    Take 30 mLs by mouth daily as needed for Constipation    METFORMIN (GLUCOPHAGE-XR) 500 MG EXTENDED RELEASE TABLET    Take 500 mg by mouth 2 times daily (with meals)    METOPROLOL TARTRATE (LOPRESSOR) 50 MG TABLET    Take 50 mg by mouth 2 times daily    NICOTINE (NICODERM CQ) 14 MG/24HR    Place 1 patch onto the skin every 24 hours    NITROGLYCERIN (NITROSTAT) 0.4 MG SL TABLET    Place 0.4 mg under the tongue every 5 minutes as needed for Chest pain up to max of 3 total doses. If no relief after 1 dose, call 911.     POLYETHYLENE GLYCOL (GLYCOLAX) PACKET    Take 17 g by mouth daily as needed Allergies     He is allergic to gabapentin and morphine. Physical Exam     INITIAL VITALS: BP: (!) 140/87, Temp: 97.5 °F (36.4 °C), Pulse: 99, Resp: 15, SpO2: 100 %       General:  Well appearing. No acute distress. Eyes:  Pupils reactive. No discharge from eyes. ENT:  No discharge from nose. OP clear. Neck:  Supple. No midline C-spine tenderness. Pulmonary:   Non-labored breathing. Breath sounds clear bilaterally. Cardiac:  Regular rate and rhythm. No murmurs. Abdomen:  Soft. Non-tender. Non-distended. Musculoskeletal:  No midline T-spine or L-spine tenderness. Able to range all extremities without difficulty with exception of right knee which is chronic. Skin: Laceration to the left occiput. Hemostatic. Neuro:  Alert and oriented x 4. CN II-XII intact. 5/5 strength in all 4 extremities. Sensation grossly intact to light touch. Speech and mentation normal.      Extremities:  Warm and perfused. No peripheral edema. Diagnostic Results         RADIOLOGY:  Please see electronic medical record for imaging studies performed in the ED. RECENT VITALS:  BP: (!) 140/87, Temp: 97.5 °F (36.4 °C), Pulse: 99, Resp: 15     Procedures     Lac Repair  Date/Time: 6/30/2020 10:28 AM  Performed by: Yamel Ontiveros MD  Authorized by: Yamel Ontiveros MD     Consent:     Consent obtained:  Verbal    Consent given by:  Patient  Anesthesia (see MAR for exact dosages):      Anesthesia method:  Local infiltration    Local anesthetic:  Lidocaine 1% WITH epi  Laceration details:     Location:  Scalp    Scalp location:  L parietal    Length (cm):  2  Repair type:     Repair type:  Simple  Pre-procedure details:     Preparation:  Patient was prepped and draped in usual sterile fashion  Exploration:     Contaminated: no    Treatment:     Area cleansed with:  Saline    Amount of cleaning:  Standard    Irrigation method:  Pressure wash  Skin repair:     Repair method: Staples    Number of staples:  3  Approximation:     Approximation:  Close  Post-procedure details:     Dressing:  Antibiotic ointment    Patient tolerance of procedure: Tolerated well, no immediate complications        ED Course     Nursing Notes, Past Medical Hx, Past Surgical Hx, Social Hx, Allergies, and Family Hx were reviewed. The patient was given the following medications:  Orders Placed This Encounter   Medications    Tetanus-Diphth-Acell Pertussis (BOOSTRIX) injection 0.5 mL    lidocaine-EPINEPHrine 1 percent-1:[id number] injection 20 mL    acetaminophen (TYLENOL) tablet 1,000 mg    bacitracin-polymyxin b (POLYSPORIN) ointment       CONSULTS:  None    MEDICAL DECISION MAKING / ASSESSMENT / Nichols Sheri is a 80 y.o. male with scalp laceration after mechanical fall. The patient was given tetanus. He was given acetaminophen for pain. CT head and C-spine were negative for traumatic injury. Scalp laceration was repaired with staples without difficulty. The patient has an appointment with his primary care physician tomorrow. No other evidence of traumatic injury based on history or physical.  Wound care instructions given. Return precautions given. The patient needs documentation to allow him to obtain a shower once a day and this will be provided. Clinical Impression     1.  Laceration of scalp, initial encounter        Disposition     PATIENT REFERRED TO:  Yusra Vickers    In 1 day  as scheduled, staples should come out in 7 to 10 days      DISCHARGE MEDICATIONS:  New Prescriptions    No medications on file       DISPOSITION Decision To Discharge 06/30/2020 10:06:54 AM          Titus Nicole MD  06/30/20 0576

## 2020-07-01 ENCOUNTER — CARE COORDINATION (OUTPATIENT)
Dept: CARE COORDINATION | Age: 85
End: 2020-07-01

## 2020-07-01 NOTE — CARE COORDINATION
Patient contacted regarding recent visit for viral symptoms. This Paxton Morrell contacted the family by telephone to perform post discharge call. Verified name and  with family as identifiers. Provided introduction to self, and reason for call due to viral symptoms of infection and/or exposure to COVID-19. Patient presented to emergency department/flu clinic with complaints of viral symptoms/exposure to COVID. Patient reports symptoms are improving. Due to no new or worsening symptoms the RN CTN/ACM was not notified for escalation. Discussed exposure protocols and quarantine with CDC Guidelines What To Do If You Are Sick    Family was given an opportunity for questions and concerns. Stay home except to get medical care    Separate yourself from other people and animals in your home    Call ahead before visiting your doctor    Wear a facemask    Cover your coughs and sneezes    Clean your hands often    Avoid sharing personal household items    Clean all high-touch surfaces everyday    Monitor your symptoms  Seek prompt medical attention if your illness is worsening (e.g., difficulty breathing). Before seeking care, call your healthcare provider and tell them that you have, or are being evaluated for, COVID-19. Put on a facemask before you enter the facility. These steps will help the healthcare provider's office to keep other people in the office or waiting room from getting infected or exposed. Ask your healthcare provider to call the local or [facility] health department. Persons who are placed under If you have a medical emergency and need to call 911, notify the dispatch personnel that you have, or are being evaluated for COVID-19. If possible, put on a facemask before emergency medical services arrive. The family agrees to contact the [facility] exposure line [phone], local health department [address]: ([phone]) and PCP office for questions related to their healthcare.  Author provided contact information for future reference. Patient/family/caregiver given information for [employer] and agrees to enroll no  Patient's preferred e-mail:    Patient's preferred phone number:   Based on Loop alert triggers, patient will be contacted by nurse care manager for worsening symptoms.

## 2020-07-02 ENCOUNTER — APPOINTMENT (OUTPATIENT)
Dept: CT IMAGING | Age: 85
End: 2020-07-02
Payer: MEDICARE

## 2020-07-02 ENCOUNTER — APPOINTMENT (OUTPATIENT)
Dept: GENERAL RADIOLOGY | Age: 85
End: 2020-07-02
Payer: MEDICARE

## 2020-07-02 ENCOUNTER — HOSPITAL ENCOUNTER (EMERGENCY)
Age: 85
Discharge: HOME | End: 2020-07-02
Attending: EMERGENCY MEDICINE
Payer: MEDICARE

## 2020-07-02 VITALS
DIASTOLIC BLOOD PRESSURE: 95 MMHG | HEART RATE: 71 BPM | RESPIRATION RATE: 18 BRPM | OXYGEN SATURATION: 100 % | WEIGHT: 208 LBS | BODY MASS INDEX: 26.69 KG/M2 | HEIGHT: 74 IN | TEMPERATURE: 98.2 F | SYSTOLIC BLOOD PRESSURE: 160 MMHG

## 2020-07-02 LAB
ANION GAP SERPL CALCULATED.3IONS-SCNC: 11 MMOL/L (ref 3–16)
BASOPHILS ABSOLUTE: 0 K/UL (ref 0–0.2)
BASOPHILS RELATIVE PERCENT: 0.6 %
BILIRUBIN URINE: NEGATIVE
BLOOD, URINE: NEGATIVE
BUN BLDV-MCNC: 23 MG/DL (ref 7–20)
CALCIUM SERPL-MCNC: 9.7 MG/DL (ref 8.3–10.6)
CHLORIDE BLD-SCNC: 95 MMOL/L (ref 99–110)
CLARITY: CLEAR
CO2: 24 MMOL/L (ref 21–32)
COLOR: YELLOW
CREAT SERPL-MCNC: 1 MG/DL (ref 0.8–1.3)
DIGOXIN LEVEL: 0.4 NG/ML (ref 0.8–2)
EKG ATRIAL RATE: 54 BPM
EKG ATRIAL RATE: 61 BPM
EKG DIAGNOSIS: NORMAL
EKG DIAGNOSIS: NORMAL
EKG P AXIS: 29 DEGREES
EKG P-R INTERVAL: 168 MS
EKG Q-T INTERVAL: 386 MS
EKG Q-T INTERVAL: 430 MS
EKG QRS DURATION: 150 MS
EKG QRS DURATION: 98 MS
EKG QTC CALCULATION (BAZETT): 432 MS
EKG QTC CALCULATION (BAZETT): 469 MS
EKG R AXIS: -12 DEGREES
EKG R AXIS: 28 DEGREES
EKG T AXIS: -12 DEGREES
EKG T AXIS: 88 DEGREES
EKG VENTRICULAR RATE: 61 BPM
EKG VENTRICULAR RATE: 89 BPM
EOSINOPHILS ABSOLUTE: 0.2 K/UL (ref 0–0.6)
EOSINOPHILS RELATIVE PERCENT: 2.7 %
GFR AFRICAN AMERICAN: >60
GFR NON-AFRICAN AMERICAN: >60
GLUCOSE BLD-MCNC: 205 MG/DL (ref 70–99)
GLUCOSE URINE: NEGATIVE MG/DL
HCT VFR BLD CALC: 36.6 % (ref 40.5–52.5)
HEMOGLOBIN: 11.9 G/DL (ref 13.5–17.5)
KETONES, URINE: NEGATIVE MG/DL
LEUKOCYTE ESTERASE, URINE: NEGATIVE
LYMPHOCYTES ABSOLUTE: 1.2 K/UL (ref 1–5.1)
LYMPHOCYTES RELATIVE PERCENT: 15.3 %
MCH RBC QN AUTO: 30.1 PG (ref 26–34)
MCHC RBC AUTO-ENTMCNC: 32.6 G/DL (ref 31–36)
MCV RBC AUTO: 92.4 FL (ref 80–100)
MICROSCOPIC EXAMINATION: NORMAL
MONOCYTES ABSOLUTE: 0.6 K/UL (ref 0–1.3)
MONOCYTES RELATIVE PERCENT: 7.6 %
NEUTROPHILS ABSOLUTE: 5.8 K/UL (ref 1.7–7.7)
NEUTROPHILS RELATIVE PERCENT: 73.8 %
NITRITE, URINE: NEGATIVE
PDW BLD-RTO: 16.6 % (ref 12.4–15.4)
PH UA: 6 (ref 5–8)
PLATELET # BLD: 115 K/UL (ref 135–450)
PMV BLD AUTO: 7 FL (ref 5–10.5)
POTASSIUM REFLEX MAGNESIUM: 4.9 MMOL/L (ref 3.5–5.1)
PRO-BNP: 1461 PG/ML (ref 0–449)
PROTEIN UA: NEGATIVE MG/DL
RBC # BLD: 3.97 M/UL (ref 4.2–5.9)
SODIUM BLD-SCNC: 130 MMOL/L (ref 136–145)
SPECIFIC GRAVITY UA: 1.01 (ref 1–1.03)
TROPONIN: 0.02 NG/ML
TROPONIN: <0.01 NG/ML
URINE REFLEX TO CULTURE: NORMAL
URINE TYPE: NORMAL
UROBILINOGEN, URINE: 0.2 E.U./DL
WBC # BLD: 7.8 K/UL (ref 4–11)

## 2020-07-02 PROCEDURE — 80162 ASSAY OF DIGOXIN TOTAL: CPT

## 2020-07-02 PROCEDURE — 71046 X-RAY EXAM CHEST 2 VIEWS: CPT

## 2020-07-02 PROCEDURE — 80048 BASIC METABOLIC PNL TOTAL CA: CPT

## 2020-07-02 PROCEDURE — 93005 ELECTROCARDIOGRAM TRACING: CPT | Performed by: EMERGENCY MEDICINE

## 2020-07-02 PROCEDURE — 99285 EMERGENCY DEPT VISIT HI MDM: CPT

## 2020-07-02 PROCEDURE — 70450 CT HEAD/BRAIN W/O DYE: CPT

## 2020-07-02 PROCEDURE — 81003 URINALYSIS AUTO W/O SCOPE: CPT

## 2020-07-02 PROCEDURE — 83880 ASSAY OF NATRIURETIC PEPTIDE: CPT

## 2020-07-02 PROCEDURE — 84484 ASSAY OF TROPONIN QUANT: CPT

## 2020-07-02 PROCEDURE — 85025 COMPLETE CBC W/AUTO DIFF WBC: CPT

## 2020-07-02 RX ORDER — SODIUM CHLORIDE 9 MG/ML
1000 INJECTION, SOLUTION INTRAVENOUS CONTINUOUS
Status: DISCONTINUED | OUTPATIENT
Start: 2020-07-02 | End: 2020-07-02 | Stop reason: HOSPADM

## 2020-07-02 ASSESSMENT — ENCOUNTER SYMPTOMS
RESPIRATORY NEGATIVE: 1
EYES NEGATIVE: 1

## 2020-07-02 NOTE — ED PROVIDER NOTES
Date of evaluation: 7/2/2020    Chief Complaint   Altered Mental Status (Per wife and nursing home, pt was acting confused earlier today. Recent fall and staples in back of head. Hx of dementia. )      Nursing Notes, Past Medical Hx, Past Surgical Hx, Social Hx, Allergies, and Family Hx were reviewed. History of Present Illness     Brianne Alvarado is a 80 y.o. male who presents with mental status change. According to his wife, he had fallen 2 days ago and was evaluated here had a head CT which was negative. She states because of his chronic knee issues he does fall not infrequently. He denied any loss of consciousness no weakness dizziness chest pain or shortness of breath with the fall. He was brought back today because when he was talking to his wife he thought he was in [place]. He does not realize what he had done and knows where he is at right now. He denies chest pain shortness of breath or abdominal pain. He has staples in the back of his head which have been without any bleeding. He is on Eliquis  Review of Systems     Review of Systems   Constitutional: Negative for activity change and appetite change. Eyes: Negative. Respiratory: Negative. Cardiovascular: Negative. Genitourinary: Negative. Neurological: Negative for dizziness, syncope and headaches. Psychiatric/Behavioral: Positive for confusion. All other systems reviewed and are negative. Past Medical, Surgical, Family, and Social History     He has a past medical history of Arthritis, Back pain, CAD (coronary artery disease), Cancer ([facility] [address] Diabetes mellitus ([facility] [address] Hyperlipidemia, Hypertension, and Rosacea. He has a past surgical history that includes Coronary angioplasty with stent; back surgery; joint replacement; shoulder surgery; eye surgery; Cataract removal with implant; Coronary artery bypass graft; Prostate surgery; Colonoscopy (9/21/11); and Colonoscopy (2/11/2015).   His family history includes Cancer in his mother and sister. He reports that he has never smoked. He does not have any smokeless tobacco history on file. He reports that he does not drink alcohol or use drugs. Medications     Previous Medications    ACETAMINOPHEN (TYLENOL) 325 MG TABLET    Take 325 mg by mouth every 6 hours as needed for Pain     ALBUTEROL (PROVENTIL) (2.5 MG/3ML) 0.083% NEBULIZER SOLUTION    Take 2.5 mg by nebulization every 6 hours as needed for Wheezing    ALBUTEROL SULFATE  (90 BASE) MCG/ACT INHALER    Inhale 2 puffs into the lungs every 4 hours as needed for Wheezing     ATORVASTATIN (LIPITOR) 40 MG TABLET    Take 40 mg by mouth nightly     CLONIDINE (CATAPRES) 0.1 MG TABLET    Take 0.1 mg by mouth 2 times daily    CLOPIDOGREL (PLAVIX) 75 MG TABLET    Take 75 mg by mouth daily    DEXTROMETHORPHAN-GUAIFENESIN  MG/5ML SYRP    Take 10 mLs by mouth every 6 hours as needed for Cough    FUROSEMIDE (LASIX) 40 MG TABLET    Take 40 mg by mouth 2 times daily     GLIPIZIDE (GLUCOTROL) 5 MG TABLET    Take 20 mg by mouth daily (with breakfast)    INSULIN ASPART (NOVOLOG) 100 UNIT/ML INJECTION VIAL    Inject 0-10 Units into the skin 4 times daily (before meals and nightly) Per sliding scale    ISOSORBIDE MONONITRATE (IMDUR) 30 MG EXTENDED RELEASE TABLET    Take 30 mg by mouth daily    LEVOTHYROXINE (SYNTHROID) 50 MCG TABLET    Take 50 mcg by mouth every morning (before breakfast)    LISINOPRIL (PRINIVIL;ZESTRIL) 20 MG TABLET    Take 20 mg by mouth daily    LORAZEPAM (ATIVAN) 1 MG TABLET    Take 1 mg by mouth every 8 hours as needed for Anxiety.     MAGNESIUM HYDROXIDE (MILK OF MAGNESIA) 400 MG/5ML SUSPENSION    Take 30 mLs by mouth daily as needed for Constipation    METFORMIN (GLUCOPHAGE-XR) 500 MG EXTENDED RELEASE TABLET    Take 500 mg by mouth 2 times daily (with meals)    METOPROLOL TARTRATE (LOPRESSOR) 50 MG TABLET    Take 50 mg by mouth 2 times daily    NICOTINE (NICODERM CQ) 14 MG/24HR    Place 1 patch onto the skin every 24 hours    NITROGLYCERIN (NITROSTAT) 0.4 MG SL TABLET    Place 0.4 mg under the tongue every 5 minutes as needed for Chest pain up to max of 3 total doses. If no relief after 1 dose, call 911. POLYETHYLENE GLYCOL (GLYCOLAX) PACKET    Take 17 g by mouth daily as needed        Allergies     He is allergic to gabapentin and morphine. Physical Exam     INITIAL VITALS: BP (!) 160/95   Pulse 71   Temp 98.2 °F (36.8 °C) (Oral)   Resp 18   Ht 6' 2\" (1.88 m)   Wt 208 lb (94.3 kg)   SpO2 100%   BMI 26.71 kg/m²    Physical Exam  Vitals signs and nursing note reviewed. Constitutional:       Appearance: Normal appearance. He is normal weight. HENT:      Head: Normocephalic. Comments: Nipples intact in the left posterior occiput no drainage no bleeding no cervical spine tenderness     Nose: Nose normal.      Mouth/Throat:      Mouth: Mucous membranes are moist.   Eyes:      Extraocular Movements: Extraocular movements intact. Pupils: Pupils are equal, round, and reactive to light. Neck:      Musculoskeletal: Normal range of motion. Cardiovascular:      Rate and Rhythm: Normal rate and regular rhythm. Pulses: Normal pulses. Pulmonary:      Effort: Pulmonary effort is normal.      Breath sounds: No stridor. Skin:     Capillary Refill: Capillary refill takes less than 2 seconds. Neurological:      General: No focal deficit present. Mental Status: He is alert. Cranial Nerves: No cranial nerve deficit. Comments: He is awake alert GCS 15 he knows his birthdate knows his wife and what her birthday is moves extremities spontaneously with good strength although limited lower due to his chronic knee conditions. No nystagmus   Psychiatric:         Mood and Affect: Mood normal.         Thought Content:  Thought content normal.         Diagnostic Results     EKG   Atrial fibrillation with controlled rate of 89   left bundle branch block no change from previous    RADIOLOGY:  CT Head WO Contrast   Final Result   1. Mild atrophy. 2. Patchy areas of decreased white matter attenuation. The findings are nonspecific, but most likely represent chronic small vessel ischemic change. 3.  No evidence of an acute intracranial process. XR CHEST STANDARD (2 VW)   Final Result   1. No evidence of acute pulmonary disease. 2. Cardiomegaly. See emr    LABS:   Labs Reviewed   CBC WITH AUTO DIFFERENTIAL - Abnormal; Notable for the following components:       Result Value    RBC 3.97 (*)     Hemoglobin 11.9 (*)     Hematocrit 36.6 (*)     RDW 16.6 (*)     Platelets 195 (*)     All other components within normal limits    Narrative:     Performed at: [facility] - [place] [address]   Phone [phone]   BASIC METABOLIC PANEL W/ REFLEX TO MG FOR LOW K - Abnormal; Notable for the following components:    Sodium 130 (*)     Chloride 95 (*)     Glucose 205 (*)     BUN 23 (*)     All other components within normal limits    Narrative:     Performed at: [facility] - [place] [address]   Phone [phone]   BRAIN NATRIURETIC PEPTIDE - Abnormal; Notable for the following components:    Pro-BNP 1,461 (*)     All other components within normal limits    Narrative:     Performed at: [facility] - [place] [address]   Phone [phone]   TROPONIN - Abnormal; Notable for the following components:    Troponin 0.02 (*)     All other components within normal limits    Narrative:     Performed at: [facility] - [place] [address]   Phone [phone]   DIGOXIN LEVEL - Abnormal; Notable for the following components:    Digoxin Lvl 0.4 (*)     All other components within normal limits    Narrative:     Performed at:   [facility]  [address],  [place], [address]   Phone [phone]   URINE RT REFLEX TO CULTURE    Narrative:     Performed at: [facility]  [address],  [place], [address]   Phone [phone]   TROPONIN    Narrative:     Performed at: [facility]  [address],  [place], [address]   Phone [phone]       BEDSIDE ULTRASOUND:      VITALS:  BP: (!) 160/95, Temp: 98.2 °F (36.8 °C), Pulse: 71, Resp: 18     Procedures         ED Course     The patient was given the followingmedications:  Orders Placed This Encounter   Medications    0.9 % sodium chloride infusion            CONSULTS:  None    MEDICAL DECISION MAKING     Ryann Lopez is a 80 y.o. male presents with an episode where he asked if he was in [place]. ~Wife, this was unusual for him. He does not recall this happening. He is awake alert oriented person place and time. I rescan his head which showed no acute bleed from the trauma he had 2 days ago. His troponin was 0.02 which he always has a mild degree of troponinemia. Repeat was less than 0.01 EKG showed atrial fib with nonspecific ST-T wave abnormalities with nonspecific ST changes left bundle branch block unchanged from previous. Digoxin level was negative. Chest x-ray showed cardiomegaly BNP was actually improved for him he is awake alert he has been observed no other issues. Urinalysis was negative. I feel at this time this could be a concussion or some age-related changes. I feel can follow-up as outpatient I did discuss this with the wife and him and they will follow-up with primary care physician      Clinical Impression     1.  Altered mental status, unspecified altered mental status type        /Plan     PATIENT REFERRED TO:  Lavelle Guzman    Schedule an appointment as soon as possible for a visit         DISCHARGE MEDICATIONS:  New Prescriptions    No medications on file       DISPOSITION Decision To Discharge 07/02/2020 06:24:48 PM      Mignon Brandon MD  07/02/20 [id number]

## 2020-07-02 NOTE — ED NOTES
Pt discharged at this time. All questions answered. Pt left alert and oriented to baseline. Skin is warm, dry and appropriate color.         Robin Rodriguez RN  07/02/20 0175

## 2020-07-26 ENCOUNTER — HOSPITAL ENCOUNTER (EMERGENCY)
Age: 85
Discharge: HOME | End: 2020-07-26
Attending: STUDENT IN AN ORGANIZED HEALTH CARE EDUCATION/TRAINING PROGRAM
Payer: MEDICARE

## 2020-07-26 VITALS
BODY MASS INDEX: 26.77 KG/M2 | OXYGEN SATURATION: 99 % | SYSTOLIC BLOOD PRESSURE: 111 MMHG | HEART RATE: 99 BPM | HEIGHT: 73 IN | RESPIRATION RATE: 17 BRPM | WEIGHT: 202 LBS | DIASTOLIC BLOOD PRESSURE: 82 MMHG | TEMPERATURE: 97.8 F

## 2020-07-26 LAB
A/G RATIO: 1.2 (ref 1.1–2.2)
ALBUMIN SERPL-MCNC: 3.5 G/DL (ref 3.4–5)
ALP BLD-CCNC: 96 U/L (ref 40–129)
ALT SERPL-CCNC: 15 U/L (ref 10–40)
ANION GAP SERPL CALCULATED.3IONS-SCNC: 11 MMOL/L (ref 3–16)
AST SERPL-CCNC: 24 U/L (ref 15–37)
BASOPHILS ABSOLUTE: 0 K/UL (ref 0–0.2)
BASOPHILS RELATIVE PERCENT: 0.6 %
BILIRUB SERPL-MCNC: 0.3 MG/DL (ref 0–1)
BUN BLDV-MCNC: 20 MG/DL (ref 7–20)
CALCIUM SERPL-MCNC: 9.4 MG/DL (ref 8.3–10.6)
CHLORIDE BLD-SCNC: 99 MMOL/L (ref 99–110)
CO2: 25 MMOL/L (ref 21–32)
CREAT SERPL-MCNC: 1 MG/DL (ref 0.8–1.3)
EOSINOPHILS ABSOLUTE: 0.2 K/UL (ref 0–0.6)
EOSINOPHILS RELATIVE PERCENT: 2.5 %
GFR AFRICAN AMERICAN: >60
GFR NON-AFRICAN AMERICAN: >60
GLOBULIN: 3 G/DL
GLUCOSE BLD-MCNC: 81 MG/DL (ref 70–99)
GLUCOSE BLD-MCNC: 84 MG/DL (ref 70–99)
GLUCOSE BLD-MCNC: 85 MG/DL (ref 70–99)
GLUCOSE BLD-MCNC: 91 MG/DL (ref 70–99)
GLUCOSE BLD-MCNC: 98 MG/DL (ref 70–99)
HCT VFR BLD CALC: 35.1 % (ref 40.5–52.5)
HEMOGLOBIN: 11.8 G/DL (ref 13.5–17.5)
LYMPHOCYTES ABSOLUTE: 0.9 K/UL (ref 1–5.1)
LYMPHOCYTES RELATIVE PERCENT: 10.2 %
MCH RBC QN AUTO: 30.9 PG (ref 26–34)
MCHC RBC AUTO-ENTMCNC: 33.5 G/DL (ref 31–36)
MCV RBC AUTO: 92.1 FL (ref 80–100)
MONOCYTES ABSOLUTE: 0.8 K/UL (ref 0–1.3)
MONOCYTES RELATIVE PERCENT: 8.8 %
NEUTROPHILS ABSOLUTE: 6.8 K/UL (ref 1.7–7.7)
NEUTROPHILS RELATIVE PERCENT: 77.9 %
PDW BLD-RTO: 16.6 % (ref 12.4–15.4)
PERFORMED ON: NORMAL
PLATELET # BLD: 117 K/UL (ref 135–450)
PMV BLD AUTO: 7 FL (ref 5–10.5)
POTASSIUM REFLEX MAGNESIUM: 4.8 MMOL/L (ref 3.5–5.1)
RBC # BLD: 3.81 M/UL (ref 4.2–5.9)
SODIUM BLD-SCNC: 135 MMOL/L (ref 136–145)
TOTAL PROTEIN: 6.5 G/DL (ref 6.4–8.2)
WBC # BLD: 8.7 K/UL (ref 4–11)

## 2020-07-26 PROCEDURE — 99284 EMERGENCY DEPT VISIT MOD MDM: CPT

## 2020-07-26 PROCEDURE — 85025 COMPLETE CBC W/AUTO DIFF WBC: CPT

## 2020-07-26 PROCEDURE — 80053 COMPREHEN METABOLIC PANEL: CPT

## 2020-07-26 ASSESSMENT — ENCOUNTER SYMPTOMS
RHINORRHEA: 0
SHORTNESS OF BREATH: 0
SORE THROAT: 0
EYE REDNESS: 0
ABDOMINAL PAIN: 0
COLOR CHANGE: 0
VOMITING: 0
EYE DISCHARGE: 0
COUGH: 0
NAUSEA: 0

## 2020-07-26 NOTE — ED PROVIDER NOTES
[facility]  EMERGENCY DEPARTMENT ENCOUNTER          ATTENDING PHYSICIAN NOTE       Date of evaluation: 7/26/2020    Chief Complaint     Hypoglycemia      History of Present Illness     Amari Simpson is a 80 y.o. male with hx of CAD, DM, HTN presents via EMS after an accidental overdose of insulin. Patient resides at a NH and staff this morning reportedly gave him 44 units of Novolog instead of the 4 units ordered. This happened about 6 hours prior to arrival.  Patient has eaten 2 meals since then and is currently asymptomatic. He denies lightheadedness, shakiness, diaphoresis, n/v, chest pain, or sob. Review of Systems     Review of Systems   Constitutional: Negative for chills, diaphoresis, fatigue and fever. HENT: Negative for rhinorrhea and sore throat. Eyes: Negative for discharge and redness. Respiratory: Negative for cough and shortness of breath. Cardiovascular: Negative for chest pain and leg swelling. Gastrointestinal: Negative for abdominal pain, nausea and vomiting. Genitourinary: Negative for dysuria and hematuria. Musculoskeletal: Negative for arthralgias and myalgias. Skin: Negative for color change and rash. Neurological: Negative for weakness, light-headedness and headaches. Psychiatric/Behavioral: Negative for agitation and confusion. Past Medical, Surgical, Family, and Social History     He has a past medical history of Arthritis, Back pain, CAD (coronary artery disease), Cancer ([address] Diabetes mellitus ([address] Hyperlipidemia, Hypertension, and Rosacea. He has a past surgical history that includes Coronary angioplasty with stent; back surgery; joint replacement; shoulder surgery; eye surgery; Cataract removal with implant; Coronary artery bypass graft; Prostate surgery; Colonoscopy (9/21/11); and Colonoscopy (2/11/2015). His family history includes Cancer in his mother and sister. He reports that he has never smoked.  He does not have any smokeless tobacco history on file. He reports that he does not drink alcohol or use drugs. Medications     Discharge Medication List as of 7/26/2020  5:18 PM      CONTINUE these medications which have NOT CHANGED    Details   isosorbide mononitrate (IMDUR) 30 MG extended release tablet Take 30 mg by mouth dailyHistorical Med      levothyroxine (SYNTHROID) 50 MCG tablet Take 50 mcg by mouth every morning (before breakfast)Historical Med      glipiZIDE (GLUCOTROL) 5 MG tablet Take 20 mg by mouth daily (with breakfast)Historical Med      lisinopril (PRINIVIL;ZESTRIL) 20 MG tablet Take 20 mg by mouth dailyHistorical Med      clopidogrel (PLAVIX) 75 MG tablet Take 75 mg by mouth dailyHistorical Med      nicotine (NICODERM CQ) 14 MG/24HR Place 1 patch onto the skin every 24 hoursHistorical Med      metFORMIN (GLUCOPHAGE-XR) 500 MG extended release tablet Take 500 mg by mouth 2 times daily (with meals)Historical Med      metoprolol tartrate (LOPRESSOR) 50 MG tablet Take 50 mg by mouth 2 times dailyHistorical Med      cloNIDine (CATAPRES) 0.1 MG tablet Take 0.1 mg by mouth 2 times dailyHistorical Med      insulin aspart (NOVOLOG) 100 UNIT/ML injection vial Inject 0-10 Units into the skin 4 times daily (before meals and nightly) Per sliding scaleHistorical Med      nitroGLYCERIN (NITROSTAT) 0.4 MG SL tablet Place 0.4 mg under the tongue every 5 minutes as needed for Chest pain up to max of 3 total doses. If no relief after 1 dose, call 911. Historical Med      albuterol (PROVENTIL) (2.5 MG/3ML) 0.083% nebulizer solution Take 2.5 mg by nebulization every 6 hours as needed for WheezingHistorical Med      LORazepam (ATIVAN) 1 MG tablet Take 1 mg by mouth every 8 hours as needed for Anxiety. Historical Med      Dextromethorphan-guaiFENesin  MG/5ML SYRP Take 10 mLs by mouth every 6 hours as needed for CoughHistorical Med      albuterol sulfate  (90 Base) MCG/ACT inhaler Inhale 2 puffs into the lungs every 4 hours as needed for Wheezing Historical Med      magnesium hydroxide (MILK OF MAGNESIA) 400 MG/5ML suspension Take 30 mLs by mouth daily as needed for ConstipationHistorical Med      polyethylene glycol (GLYCOLAX) packet Take 17 g by mouth daily as needed Historical Med      acetaminophen (TYLENOL) 325 MG tablet Take 325 mg by mouth every 6 hours as needed for Pain Historical Med      furosemide (LASIX) 40 MG tablet Take 40 mg by mouth 2 times daily Historical Med      atorvastatin (LIPITOR) 40 MG tablet Take 40 mg by mouth nightly Historical Med             Allergies     He is allergic to gabapentin and morphine. Physical Exam     INITIAL VITALS: BP: (!) 109/59, Temp: 97.8 °F (36.6 °C), Pulse: 78, Resp: 20, SpO2: 100 %   Physical Exam  Constitutional:       Appearance: Normal appearance. HENT:      Head: Normocephalic and atraumatic. Right Ear: External ear normal.      Left Ear: External ear normal.   Eyes:      Extraocular Movements: Extraocular movements intact. Pupils: Pupils are equal, round, and reactive to light. Neck:      Musculoskeletal: Normal range of motion and neck supple. Cardiovascular:      Rate and Rhythm: Normal rate. Pulmonary:      Effort: Pulmonary effort is normal. No respiratory distress. Abdominal:      Palpations: Abdomen is soft. Tenderness: There is no abdominal tenderness. Musculoskeletal:         General: No swelling or deformity. Neurological:      General: No focal deficit present. Mental Status: He is alert and oriented to person, place, and time. GCS: GCS eye subscore is 4. GCS verbal subscore is 5. GCS motor subscore is 6.    Psychiatric:         Mood and Affect: Mood normal.         Behavior: Behavior normal.         Diagnostic Results       RADIOLOGY:  No orders to display       LABS:   Results for orders placed or performed during the hospital encounter of 07/26/20   CBC Auto Differential   Result Value Ref Range    WBC 8.7 4.0 - 11.0 K/uL    RBC 3.81

## 2020-10-09 ENCOUNTER — APPOINTMENT (OUTPATIENT)
Dept: CT IMAGING | Age: 85
End: 2020-10-09
Payer: MEDICARE

## 2020-10-09 ENCOUNTER — HOSPITAL ENCOUNTER (EMERGENCY)
Age: 85
Discharge: HOME | End: 2020-10-09
Attending: EMERGENCY MEDICINE
Payer: MEDICARE

## 2020-10-09 ENCOUNTER — APPOINTMENT (OUTPATIENT)
Dept: GENERAL RADIOLOGY | Age: 85
End: 2020-10-09
Payer: MEDICARE

## 2020-10-09 VITALS
OXYGEN SATURATION: 97 % | RESPIRATION RATE: 18 BRPM | TEMPERATURE: 98.6 F | SYSTOLIC BLOOD PRESSURE: 134 MMHG | HEART RATE: 93 BPM | DIASTOLIC BLOOD PRESSURE: 86 MMHG

## 2020-10-09 PROCEDURE — 73130 X-RAY EXAM OF HAND: CPT

## 2020-10-09 PROCEDURE — 72125 CT NECK SPINE W/O DYE: CPT

## 2020-10-09 PROCEDURE — 70450 CT HEAD/BRAIN W/O DYE: CPT

## 2020-10-09 PROCEDURE — 99284 EMERGENCY DEPT VISIT MOD MDM: CPT

## 2020-10-09 PROCEDURE — 6370000000 HC RX 637 (ALT 250 FOR IP): Performed by: EMERGENCY MEDICINE

## 2020-10-09 RX ORDER — ACETAMINOPHEN 325 MG/1
650 TABLET ORAL ONCE
Status: COMPLETED | OUTPATIENT
Start: 2020-10-09 | End: 2020-10-09

## 2020-10-09 RX ADMIN — ACETAMINOPHEN 650 MG: 325 TABLET ORAL at 06:45

## 2020-10-09 ASSESSMENT — PAIN SCALES - GENERAL: PAINLEVEL_OUTOF10: 3

## 2020-10-09 NOTE — ED NOTES
Report to [facility] at [facility]. ED results printed and sent with patient.       Shania Cruz RN  10/09/20 2368

## 2020-10-09 NOTE — ED NOTES
Pt discharged to home. IV removed, tip intact and pressure applied. Pt given discharge instructions and verbalized understanding.  Pt assisted to wheelchair to awaiting car     Xin Hunter RN  10/09/20 [id number]

## 2020-10-09 NOTE — ED PROVIDER NOTES
[facility] ENCOUNTER          ATTENDING PHYSICIAN NOTE       Date of evaluation: 10/9/2020    Chief Complaint     Fall      History of Present Illness     Macrina Rivera is a 80 y.o. male who presents to the emergency department with the complaint of bilateral hand pain after a fall. Patient reports that he was sitting in his wheelchair got up to walk across the room and after walking approximately 5 to 10 feet fell down to the ground. He remembers falling and he remembers striking the ground. He does not think that he was on the ground for a long period of time. He pressed his life alert button and had relatively quick assistance by the nursing staff at his assisted living center. He states that over the course the past several days he has had generally good state of health with the exception of some mild depression. Reports good appetite reports that he has not had any fever chills nausea vomiting chest pain shortness of breath abdominal pain diarrhea. He is currently only complaining of mild bilateral hand pain as well as mild frontal headache. Denies any nausea vomiting at this time. Review of Systems     As documented in the HPI, otherwise all other systems were reviewed and were negative. Past Medical, Surgical, Family, and Social History     He has a past medical history of Arthritis, Back pain, CAD (coronary artery disease), Cancer ([facility] [address] Diabetes mellitus ([facility] [address] Hyperlipidemia, Hypertension, and Rosacea. He has a past surgical history that includes Coronary angioplasty with stent; back surgery; joint replacement; shoulder surgery; eye surgery; Cataract removal with implant; Coronary artery bypass graft; Prostate surgery; Colonoscopy (9/21/11); and Colonoscopy (2/11/2015). His family history includes Cancer in his mother and sister. He reports that he has never smoked. He does not have any smokeless tobacco history on file.  He reports that he does not drink alcohol or use drugs. Medications     Previous Medications    ACETAMINOPHEN (TYLENOL) 325 MG TABLET    Take 325 mg by mouth every 6 hours as needed for Pain     ALBUTEROL (PROVENTIL) (2.5 MG/3ML) 0.083% NEBULIZER SOLUTION    Take 2.5 mg by nebulization every 6 hours as needed for Wheezing    ALBUTEROL SULFATE  (90 BASE) MCG/ACT INHALER    Inhale 2 puffs into the lungs every 4 hours as needed for Wheezing     ATORVASTATIN (LIPITOR) 40 MG TABLET    Take 40 mg by mouth nightly     CLONIDINE (CATAPRES) 0.1 MG TABLET    Take 0.1 mg by mouth 2 times daily    CLOPIDOGREL (PLAVIX) 75 MG TABLET    Take 75 mg by mouth daily    DEXTROMETHORPHAN-GUAIFENESIN  MG/5ML SYRP    Take 10 mLs by mouth every 6 hours as needed for Cough    FUROSEMIDE (LASIX) 40 MG TABLET    Take 40 mg by mouth 2 times daily     GLIPIZIDE (GLUCOTROL) 5 MG TABLET    Take 20 mg by mouth daily (with breakfast)    INSULIN ASPART (NOVOLOG) 100 UNIT/ML INJECTION VIAL    Inject 0-10 Units into the skin 4 times daily (before meals and nightly) Per sliding scale    ISOSORBIDE MONONITRATE (IMDUR) 30 MG EXTENDED RELEASE TABLET    Take 30 mg by mouth daily    LEVOTHYROXINE (SYNTHROID) 50 MCG TABLET    Take 50 mcg by mouth every morning (before breakfast)    LISINOPRIL (PRINIVIL;ZESTRIL) 20 MG TABLET    Take 20 mg by mouth daily    LORAZEPAM (ATIVAN) 1 MG TABLET    Take 1 mg by mouth every 8 hours as needed for Anxiety.     MAGNESIUM HYDROXIDE (MILK OF MAGNESIA) 400 MG/5ML SUSPENSION    Take 30 mLs by mouth daily as needed for Constipation    METFORMIN (GLUCOPHAGE-XR) 500 MG EXTENDED RELEASE TABLET    Take 500 mg by mouth 2 times daily (with meals)    METOPROLOL TARTRATE (LOPRESSOR) 50 MG TABLET    Take 50 mg by mouth 2 times daily    NICOTINE (NICODERM CQ) 14 MG/24HR    Place 1 patch onto the skin every 24 hours    NITROGLYCERIN (NITROSTAT) 0.4 MG SL TABLET    Place 0.4 mg under the tongue every 5 minutes as needed for Chest pain up to max of 3 total doses. If no relief after 1 dose, call 911. POLYETHYLENE GLYCOL (GLYCOLAX) PACKET    Take 17 g by mouth daily as needed        Allergies     He is allergic to gabapentin and morphine. Physical Exam     INITIAL VITALS: BP: 134/86, Temp: 98.6 °F (37 °C), Pulse: 93, Resp: 18, SpO2: 97 %   General: 15-year-old male who is sitting in bed no apparent cardiorespiratory distress  HEENT: Abrasion overlying the right forehead, pupils equal round and reactive to light, sclera are clear, oropharynx is nonerythematous  Neck: supple, no lymphadenopathy  Chest: nonlabored respirations, equal chest rise bilaterally, no accessory muscle use  Cardiovascular: Regular, rate, and rhythm, 2+ radial pulses bilaterally, capillary refill 2 seconds  Abdominal: Soft, nontender, nondistended, positive bowel sounds throughout, no rebound or guarding  Skin: Warm, dry well perfused, no rashes  Musculoskeletal: no obvious deformities, tenderness to palpation diffusely throughout the bilateral hands without significant external evidence of trauma  Neurologic:  alert and oriented x4, speech is clear and intact without dysarthria, GCS is 15, moving all 4 extremities equally    Diagnostic Results     RADIOLOGY:  CT HEAD WO CONTRAST    (Results Pending)   XR HAND RIGHT (MIN 3 VIEWS)    (Results Pending)   XR HAND LEFT (MIN 3 VIEWS)    (Results Pending)       LABS:   No results found for this visit on 10/09/20. RECENT VITALS:  BP: 134/86, Temp: 98.6 °F (37 °C), Pulse: 93, Resp: 18, SpO2: 97 %     ED Course     Nursing Notes, Past Medical Hx, Past Surgical Hx, Social Hx, Allergies, and Family Hx were reviewed.     The patient was given the following medications:  Orders Placed This Encounter   Medications    acetaminophen (TYLENOL) tablet 650 mg       CONSULTS:  None    MEDICAL DECISION MAKING / ASSESSMENT / Timasandro Lal is a 80 y.o. male who presented to the emergency department with a complaint of a fall from level ground and head trauma. The patient is on Eliquis for atrial fibrillation. On physical examination he has some mild bilateral hand pain without significant signs of trauma and had a right forehead abrasion. Given the fact that he is on anticoagulation and given the evidence of trauma above the clavicles the decision was made to perform a CT of the head without contrast.  Patient also had x-rays of the bilateral hands. He has been given Tylenol. The patient will be turned over to an oncoming provider who will follow up the results of the radiographic studies as ordered above and will disposition the patient    Clinical Impression     1. Abrasion of face, initial encounter    2. Contusion of hand, unspecified laterality, initial encounter    3. Fall, initial encounter        Disposition     PATIENT REFERRED TO:  No follow-up provider specified. DISCHARGE MEDICATIONS:  New Prescriptions    No medications on file       DISPOSITION  -Pending at this time, turned over to new provider.          Catrachito Orellana MD  10/09/20 0635

## 2020-10-09 NOTE — ED PROVIDER NOTES
[address] [facility]          ATTENDING PHYSICIAN NOTE       Date of evaluation: 10/9/2020    ADDENDUM:      Care of this patient was assumed from Dr. Medhat Sorenson. The patient was seen for Fall  . The patient's initial evaluation and plan have been discussed with the prior provider who initially evaluated the patient. Nursing Notes, Past Medical Hx, Past Surgical Hx, Social Hx, Allergies, and Family Hx were all reviewed. Diagnostic Results       RADIOLOGY:  CT CERVICAL SPINE WO CONTRAST   Final Result      Multilevel degenerative changes similar to 6/30/2020 as described. No acute fracture, subluxation or prevertebral soft tissue swelling                  CT HEAD WO CONTRAST   Final Result      No acute intracranial hemorrhage or signs of mass effect      Remote small ischemic infarcts, unchanged      Chronic right maxillary sinusitis, an interval finding      XR HAND LEFT (MIN 3 VIEWS)   Final Result      Marked arthropathic changes as described. No fracture            Right hand 3 views      HISTORY: Same      Severe degenerative changes involve the trapezium-first metacarpal joint with mild lateral subluxation of the first metacarpal. Fibro-osseous cysts of the capitate and lunate and possibly also the scaphoid. No fracture identified. Severe joint space narrowing at the second and fifth proximal interphalangeal joints and severe narrowing of the distal interphalangeal joints of all digits. Chondrocalcinosis of the triangular fibrocartilage is probably degenerative. Vascular calcification      IMPRESSION:      No fracture. Severe arthropathic changes as described      XR HAND RIGHT (MIN 3 VIEWS)   Final Result      Marked arthropathic changes as described.  No fracture            Right hand 3 views      HISTORY: Same      Severe degenerative changes involve the trapezium-first metacarpal joint with mild lateral subluxation of the first metacarpal. Fibro-osseous cysts of the capitate and lunate and possibly also the scaphoid. No fracture identified. Severe joint space narrowing at the second and fifth proximal interphalangeal joints and severe narrowing of the distal interphalangeal joints of all digits. Chondrocalcinosis of the triangular fibrocartilage is probably degenerative. Vascular calcification      IMPRESSION:      No fracture. Severe arthropathic changes as described          LABS:   No results found for this visit on 10/09/20. RECENT VITALS:  BP: 134/86, Temp: 98.6 °F (37 °C), Pulse: 93, Resp: 18, SpO2: 97 %     Procedures     none    ED Course     The patient was given the following medications:  Orders Placed This Encounter   Medications    acetaminophen (TYLENOL) tablet 650 mg       CONSULTS:  None    MEDICAL DECISION MAKING / ASSESSMENT / Zygmunt Rachel is a 80 y.o. male who usually ambulates with a wheelchair who presents today after a fall. Patient was standing up in order to get something to drink and he fell between his usual walker and his power chair. He did report head trauma, denied any LOC. Patient is on Eliquis. He believes he tried to brace his fall with his bilateral hands and notes pain and soreness in his hands. Tetanus immunization is up-to-date (June 2020). Patient was seen by previous provider and CT head and bilateral hand x-rays were ordered. 0730 on my initial evaluation patient was awake alert oriented, resting comfortably in bed, abrasion noted to his forehead, he did have midline C-spine tenderness to palpation, CT C-spine was ordered due to this midline pain. Patient had full range of motion of bilateral upper and lower extremities, clear to auscultation. I did discuss getting lab work with the patient since he cannot identify clear etiology for the fall. We discussed identifying other potential etiologies for fall such as anemia or electrolyte disturbance.   Patient wished to hold off on lab work at this time.    0830 CT c-spine negative for acute fracture/dislocaiton. Stable for discharge. Clinical Impression     1. Abrasion of face, initial encounter    2. Contusion of hand, unspecified laterality, initial encounter    3.  Fall, initial encounter        Disposition     PATIENT REFERRED TO:  Melvin Lynn    Schedule an appointment as soon as possible for a visit in 1 week        DISCHARGE MEDICATIONS:  New Prescriptions    No medications on file       DISPOSITION Decision To Discharge 10/09/2020 08:53:28 AM          Dustin Casillas MD  10/09/20 [id number]

## 2020-10-17 ENCOUNTER — APPOINTMENT (OUTPATIENT)
Dept: GENERAL RADIOLOGY | Age: 85
DRG: 101 | End: 2020-10-17
Payer: MEDICARE

## 2020-10-17 ENCOUNTER — HOSPITAL ENCOUNTER (INPATIENT)
Age: 85
LOS: 3 days | Discharge: HOME HEALTH | DRG: 101 | End: 2020-10-20
Attending: EMERGENCY MEDICINE | Admitting: INTERNAL MEDICINE
Payer: MEDICARE

## 2020-10-17 ENCOUNTER — APPOINTMENT (OUTPATIENT)
Dept: CT IMAGING | Age: 85
DRG: 101 | End: 2020-10-17
Payer: MEDICARE

## 2020-10-17 PROBLEM — N39.0 UTI (URINARY TRACT INFECTION): Status: ACTIVE | Noted: 2020-10-17

## 2020-10-17 LAB
ALBUMIN SERPL-MCNC: 3.7 G/DL (ref 3.4–5)
ALP BLD-CCNC: 95 U/L (ref 40–129)
ALT SERPL-CCNC: 7 U/L (ref 10–40)
ANION GAP SERPL CALCULATED.3IONS-SCNC: 8 MMOL/L (ref 3–16)
AST SERPL-CCNC: 23 U/L (ref 15–37)
BACTERIA: ABNORMAL /HPF
BASE EXCESS VENOUS: 1.9 MMOL/L (ref -2–3)
BASOPHILS ABSOLUTE: 0 K/UL (ref 0–0.2)
BASOPHILS RELATIVE PERCENT: 0.4 %
BILIRUB SERPL-MCNC: 0.8 MG/DL (ref 0–1)
BILIRUBIN DIRECT: 0.3 MG/DL (ref 0–0.3)
BILIRUBIN URINE: NEGATIVE
BILIRUBIN, INDIRECT: 0.5 MG/DL (ref 0–1)
BLOOD, URINE: NEGATIVE
BUN BLDV-MCNC: 17 MG/DL (ref 7–20)
CALCIUM SERPL-MCNC: 9.1 MG/DL (ref 8.3–10.6)
CARBOXYHEMOGLOBIN: 2.9 % (ref 0–1.5)
CHLORIDE BLD-SCNC: 93 MMOL/L (ref 99–110)
CLARITY: CLEAR
CO2: 26 MMOL/L (ref 21–32)
COLOR: YELLOW
CREAT SERPL-MCNC: 0.8 MG/DL (ref 0.8–1.3)
DIGOXIN LEVEL: 0.8 NG/ML (ref 0.8–2)
EOSINOPHILS ABSOLUTE: 0.2 K/UL (ref 0–0.6)
EOSINOPHILS RELATIVE PERCENT: 2.4 %
EPITHELIAL CELLS, UA: ABNORMAL /HPF (ref 0–5)
GFR AFRICAN AMERICAN: >60
GFR NON-AFRICAN AMERICAN: >60
GLUCOSE BLD-MCNC: 191 MG/DL (ref 70–99)
GLUCOSE BLD-MCNC: 192 MG/DL (ref 70–99)
GLUCOSE BLD-MCNC: 220 MG/DL (ref 70–99)
GLUCOSE URINE: NEGATIVE MG/DL
HCO3 VENOUS: 26.5 MMOL/L (ref 24–28)
HCT VFR BLD CALC: 33.5 % (ref 40.5–52.5)
HEMOGLOBIN, VEN, REDUCED: 32.7 %
HEMOGLOBIN: 11 G/DL (ref 13.5–17.5)
KETONES, URINE: NEGATIVE MG/DL
LACTIC ACID: 2 MMOL/L (ref 0.4–2)
LEUKOCYTE ESTERASE, URINE: ABNORMAL
LYMPHOCYTES ABSOLUTE: 1 K/UL (ref 1–5.1)
LYMPHOCYTES RELATIVE PERCENT: 13 %
MCH RBC QN AUTO: 31.1 PG (ref 26–34)
MCHC RBC AUTO-ENTMCNC: 32.8 G/DL (ref 31–36)
MCV RBC AUTO: 94.7 FL (ref 80–100)
METHEMOGLOBIN VENOUS: 0.4 % (ref 0–1.5)
MICROSCOPIC EXAMINATION: YES
MONOCYTES ABSOLUTE: 0.8 K/UL (ref 0–1.3)
MONOCYTES RELATIVE PERCENT: 10.8 %
MUCUS: ABNORMAL /LPF
NEUTROPHILS ABSOLUTE: 5.4 K/UL (ref 1.7–7.7)
NEUTROPHILS RELATIVE PERCENT: 73.4 %
NITRITE, URINE: NEGATIVE
O2 SAT, VEN: 66 %
OSMOLALITY URINE: 240 MOSM/KG (ref 390–1070)
PCO2, VEN: 44.4 MMHG (ref 41–51)
PDW BLD-RTO: 16.6 % (ref 12.4–15.4)
PERFORMED ON: ABNORMAL
PERFORMED ON: ABNORMAL
PH UA: 6 (ref 5–8)
PH VENOUS: 7.39 (ref 7.35–7.45)
PLATELET # BLD: 122 K/UL (ref 135–450)
PMV BLD AUTO: 7.4 FL (ref 5–10.5)
PO2, VEN: 38.9 MMHG (ref 25–40)
POTASSIUM REFLEX MAGNESIUM: 4.2 MMOL/L (ref 3.5–5.1)
PROTEIN UA: NEGATIVE MG/DL
RBC # BLD: 3.54 M/UL (ref 4.2–5.9)
RBC UA: ABNORMAL /HPF (ref 0–4)
SODIUM BLD-SCNC: 127 MMOL/L (ref 136–145)
SODIUM BLD-SCNC: 129 MMOL/L (ref 136–145)
SODIUM URINE: 43 MMOL/L
SPECIFIC GRAVITY UA: 1.01 (ref 1–1.03)
TCO2 CALC VENOUS: 28 MMOL/L
TOTAL PROTEIN: 6.2 G/DL (ref 6.4–8.2)
TROPONIN: 0.02 NG/ML
URINE TYPE: ABNORMAL
UROBILINOGEN, URINE: 0.2 E.U./DL
WBC # BLD: 7.4 K/UL (ref 4–11)
WBC UA: ABNORMAL /HPF (ref 0–5)

## 2020-10-17 PROCEDURE — U0003 INFECTIOUS AGENT DETECTION BY NUCLEIC ACID (DNA OR RNA); SEVERE ACUTE RESPIRATORY SYNDROME CORONAVIRUS 2 (SARS-COV-2) (CORONAVIRUS DISEASE [COVID-19]), AMPLIFIED PROBE TECHNIQUE, MAKING USE OF HIGH THROUGHPUT TECHNOLOGIES AS DESCRIBED BY CMS-2020-01-R: HCPCS

## 2020-10-17 PROCEDURE — 83935 ASSAY OF URINE OSMOLALITY: CPT

## 2020-10-17 PROCEDURE — 93005 ELECTROCARDIOGRAM TRACING: CPT | Performed by: STUDENT IN AN ORGANIZED HEALTH CARE EDUCATION/TRAINING PROGRAM

## 2020-10-17 PROCEDURE — 87077 CULTURE AEROBIC IDENTIFY: CPT

## 2020-10-17 PROCEDURE — 82803 BLOOD GASES ANY COMBINATION: CPT

## 2020-10-17 PROCEDURE — 2580000003 HC RX 258: Performed by: INTERNAL MEDICINE

## 2020-10-17 PROCEDURE — 81001 URINALYSIS AUTO W/SCOPE: CPT

## 2020-10-17 PROCEDURE — 2580000003 HC RX 258: Performed by: STUDENT IN AN ORGANIZED HEALTH CARE EDUCATION/TRAINING PROGRAM

## 2020-10-17 PROCEDURE — 87186 SC STD MICRODIL/AGAR DIL: CPT

## 2020-10-17 PROCEDURE — 36415 COLL VENOUS BLD VENIPUNCTURE: CPT

## 2020-10-17 PROCEDURE — 6360000002 HC RX W HCPCS: Performed by: STUDENT IN AN ORGANIZED HEALTH CARE EDUCATION/TRAINING PROGRAM

## 2020-10-17 PROCEDURE — 84484 ASSAY OF TROPONIN QUANT: CPT

## 2020-10-17 PROCEDURE — 83605 ASSAY OF LACTIC ACID: CPT

## 2020-10-17 PROCEDURE — 80076 HEPATIC FUNCTION PANEL: CPT

## 2020-10-17 PROCEDURE — 6370000000 HC RX 637 (ALT 250 FOR IP): Performed by: STUDENT IN AN ORGANIZED HEALTH CARE EDUCATION/TRAINING PROGRAM

## 2020-10-17 PROCEDURE — 85025 COMPLETE CBC W/AUTO DIFF WBC: CPT

## 2020-10-17 PROCEDURE — 99285 EMERGENCY DEPT VISIT HI MDM: CPT

## 2020-10-17 PROCEDURE — 6370000000 HC RX 637 (ALT 250 FOR IP): Performed by: INTERNAL MEDICINE

## 2020-10-17 PROCEDURE — 80048 BASIC METABOLIC PNL TOTAL CA: CPT

## 2020-10-17 PROCEDURE — 80162 ASSAY OF DIGOXIN TOTAL: CPT

## 2020-10-17 PROCEDURE — 6360000002 HC RX W HCPCS: Performed by: INTERNAL MEDICINE

## 2020-10-17 PROCEDURE — 70450 CT HEAD/BRAIN W/O DYE: CPT

## 2020-10-17 PROCEDURE — 71045 X-RAY EXAM CHEST 1 VIEW: CPT

## 2020-10-17 PROCEDURE — 87086 URINE CULTURE/COLONY COUNT: CPT

## 2020-10-17 PROCEDURE — 84300 ASSAY OF URINE SODIUM: CPT

## 2020-10-17 PROCEDURE — 2060000000 HC ICU INTERMEDIATE R&B

## 2020-10-17 PROCEDURE — 84295 ASSAY OF SERUM SODIUM: CPT

## 2020-10-17 RX ORDER — ONDANSETRON 2 MG/ML
4 INJECTION INTRAMUSCULAR; INTRAVENOUS EVERY 6 HOURS PRN
Status: DISCONTINUED | OUTPATIENT
Start: 2020-10-17 | End: 2020-10-20 | Stop reason: HOSPADM

## 2020-10-17 RX ORDER — HYDROCORTISONE 5 MG/1
15 TABLET ORAL DAILY
Status: DISCONTINUED | OUTPATIENT
Start: 2020-10-18 | End: 2020-10-20 | Stop reason: HOSPADM

## 2020-10-17 RX ORDER — DULOXETIN HYDROCHLORIDE 60 MG/1
60 CAPSULE, DELAYED RELEASE ORAL EVERY EVENING
Status: DISCONTINUED | OUTPATIENT
Start: 2020-10-17 | End: 2020-10-20 | Stop reason: HOSPADM

## 2020-10-17 RX ORDER — TRAZODONE HYDROCHLORIDE 100 MG/1
200 TABLET ORAL NIGHTLY
Status: DISCONTINUED | OUTPATIENT
Start: 2020-10-17 | End: 2020-10-20 | Stop reason: HOSPADM

## 2020-10-17 RX ORDER — ACETAMINOPHEN 650 MG/1
650 SUPPOSITORY RECTAL EVERY 6 HOURS PRN
Status: DISCONTINUED | OUTPATIENT
Start: 2020-10-17 | End: 2020-10-20 | Stop reason: HOSPADM

## 2020-10-17 RX ORDER — DOCUSATE SODIUM 100 MG/1
100 CAPSULE, LIQUID FILLED ORAL 2 TIMES DAILY
Status: DISCONTINUED | OUTPATIENT
Start: 2020-10-17 | End: 2020-10-20 | Stop reason: HOSPADM

## 2020-10-17 RX ORDER — LORAZEPAM 0.5 MG/1
0.5 TABLET ORAL NIGHTLY PRN
Status: DISCONTINUED | OUTPATIENT
Start: 2020-10-17 | End: 2020-10-20 | Stop reason: HOSPADM

## 2020-10-17 RX ORDER — HYDROCORTISONE 5 MG/1
7.5 TABLET ORAL NIGHTLY
Status: ON HOLD | COMMUNITY
End: 2020-11-30 | Stop reason: HOSPADM

## 2020-10-17 RX ORDER — DIGOXIN 125 MCG
125 TABLET ORAL DAILY
Status: DISCONTINUED | OUTPATIENT
Start: 2020-10-18 | End: 2020-10-20 | Stop reason: HOSPADM

## 2020-10-17 RX ORDER — PROMETHAZINE HYDROCHLORIDE 25 MG/1
12.5 TABLET ORAL EVERY 6 HOURS PRN
Status: DISCONTINUED | OUTPATIENT
Start: 2020-10-17 | End: 2020-10-20 | Stop reason: HOSPADM

## 2020-10-17 RX ORDER — HYDROCORTISONE 5 MG/1
7.5 TABLET ORAL EVERY EVENING
Status: DISCONTINUED | OUTPATIENT
Start: 2020-10-18 | End: 2020-10-20 | Stop reason: HOSPADM

## 2020-10-17 RX ORDER — TAMSULOSIN HYDROCHLORIDE 0.4 MG/1
0.4 CAPSULE ORAL NIGHTLY
Status: DISCONTINUED | OUTPATIENT
Start: 2020-10-17 | End: 2020-10-20 | Stop reason: HOSPADM

## 2020-10-17 RX ORDER — GUAIFENESIN 600 MG/1
600 TABLET, EXTENDED RELEASE ORAL 2 TIMES DAILY
Status: DISCONTINUED | OUTPATIENT
Start: 2020-10-17 | End: 2020-10-20 | Stop reason: HOSPADM

## 2020-10-17 RX ORDER — HYDROCORTISONE 5 MG/1
15 TABLET ORAL DAILY
Status: ON HOLD | COMMUNITY
End: 2020-11-30 | Stop reason: HOSPADM

## 2020-10-17 RX ORDER — ACETAMINOPHEN 325 MG/1
650 TABLET ORAL EVERY 6 HOURS PRN
Status: DISCONTINUED | OUTPATIENT
Start: 2020-10-17 | End: 2020-10-20 | Stop reason: HOSPADM

## 2020-10-17 RX ORDER — TRAZODONE HYDROCHLORIDE 100 MG/1
200 TABLET ORAL NIGHTLY
Status: ON HOLD | COMMUNITY
End: 2020-11-30 | Stop reason: HOSPADM

## 2020-10-17 RX ORDER — DULOXETIN HYDROCHLORIDE 60 MG/1
60 CAPSULE, DELAYED RELEASE ORAL EVERY EVENING
Status: ON HOLD | COMMUNITY
End: 2020-11-23

## 2020-10-17 RX ORDER — TAMSULOSIN HYDROCHLORIDE 0.4 MG/1
0.4 CAPSULE ORAL NIGHTLY
Status: ON HOLD | COMMUNITY
End: 2020-11-30 | Stop reason: HOSPADM

## 2020-10-17 RX ORDER — LANOLIN ALCOHOL/MO/W.PET/CERES
1000 CREAM (GRAM) TOPICAL DAILY
Status: DISCONTINUED | OUTPATIENT
Start: 2020-10-18 | End: 2020-10-20 | Stop reason: HOSPADM

## 2020-10-17 RX ORDER — OXYCODONE HYDROCHLORIDE 5 MG/1
5 TABLET ORAL EVERY 6 HOURS PRN
Status: DISCONTINUED | OUTPATIENT
Start: 2020-10-17 | End: 2020-10-20 | Stop reason: HOSPADM

## 2020-10-17 RX ORDER — HYDROCORTISONE 5 MG/1
15 TABLET ORAL ONCE
Status: COMPLETED | OUTPATIENT
Start: 2020-10-17 | End: 2020-10-17

## 2020-10-17 RX ORDER — DOCUSATE SODIUM 100 MG/1
100 CAPSULE, LIQUID FILLED ORAL 2 TIMES DAILY
Status: ON HOLD | COMMUNITY
End: 2020-11-30 | Stop reason: HOSPADM

## 2020-10-17 RX ORDER — SODIUM CHLORIDE 9 MG/ML
INJECTION, SOLUTION INTRAVENOUS CONTINUOUS
Status: ACTIVE | OUTPATIENT
Start: 2020-10-17 | End: 2020-10-18

## 2020-10-17 RX ORDER — INSULIN DEGLUDEC INJECTION 100 U/ML
14 INJECTION, SOLUTION SUBCUTANEOUS DAILY
Status: ON HOLD | COMMUNITY
End: 2020-11-30 | Stop reason: HOSPADM

## 2020-10-17 RX ORDER — GUAIFENESIN 600 MG/1
600 TABLET, EXTENDED RELEASE ORAL 2 TIMES DAILY
Status: ON HOLD | COMMUNITY
End: 2020-11-23 | Stop reason: ALTCHOICE

## 2020-10-17 RX ORDER — MENTHOL AND ZINC OXIDE .44; 20.625 G/100G; G/100G
OINTMENT TOPICAL DAILY
Status: ON HOLD | COMMUNITY
End: 2020-11-30 | Stop reason: HOSPADM

## 2020-10-17 RX ORDER — POLYETHYLENE GLYCOL 3350 17 G/17G
17 POWDER, FOR SOLUTION ORAL DAILY PRN
Status: DISCONTINUED | OUTPATIENT
Start: 2020-10-17 | End: 2020-10-20 | Stop reason: HOSPADM

## 2020-10-17 RX ORDER — OXYCODONE HYDROCHLORIDE 5 MG/1
5 TABLET ORAL EVERY 6 HOURS PRN
Status: ON HOLD | COMMUNITY
End: 2020-11-23 | Stop reason: ALTCHOICE

## 2020-10-17 RX ORDER — ATORVASTATIN CALCIUM 20 MG/1
20 TABLET, FILM COATED ORAL NIGHTLY
Status: DISCONTINUED | OUTPATIENT
Start: 2020-10-17 | End: 2020-10-20 | Stop reason: HOSPADM

## 2020-10-17 RX ORDER — BISACODYL 10 MG
10 SUPPOSITORY, RECTAL RECTAL DAILY PRN
Status: ON HOLD | COMMUNITY
End: 2020-11-30 | Stop reason: HOSPADM

## 2020-10-17 RX ORDER — SODIUM CHLORIDE 0.9 % (FLUSH) 0.9 %
10 SYRINGE (ML) INJECTION PRN
Status: DISCONTINUED | OUTPATIENT
Start: 2020-10-17 | End: 2020-10-20 | Stop reason: HOSPADM

## 2020-10-17 RX ORDER — SODIUM CHLORIDE 0.9 % (FLUSH) 0.9 %
10 SYRINGE (ML) INJECTION EVERY 12 HOURS SCHEDULED
Status: DISCONTINUED | OUTPATIENT
Start: 2020-10-17 | End: 2020-10-20 | Stop reason: HOSPADM

## 2020-10-17 RX ORDER — DIGOXIN 125 MCG
125 TABLET ORAL DAILY
Status: ON HOLD | COMMUNITY
End: 2020-11-30 | Stop reason: HOSPADM

## 2020-10-17 RX ORDER — POLYETHYLENE GLYCOL 3350 17 G/17G
17 POWDER, FOR SOLUTION ORAL DAILY
Status: DISCONTINUED | OUTPATIENT
Start: 2020-10-18 | End: 2020-10-20 | Stop reason: HOSPADM

## 2020-10-17 RX ADMIN — GUAIFENESIN 600 MG: 600 TABLET, EXTENDED RELEASE ORAL at 20:47

## 2020-10-17 RX ADMIN — DULOXETINE HYDROCHLORIDE 60 MG: 60 CAPSULE, DELAYED RELEASE ORAL at 20:49

## 2020-10-17 RX ADMIN — TAMSULOSIN HYDROCHLORIDE 0.4 MG: 0.4 CAPSULE ORAL at 20:46

## 2020-10-17 RX ADMIN — CARBIDOPA AND LEVODOPA 1 TABLET: 25; 100 TABLET ORAL at 20:46

## 2020-10-17 RX ADMIN — Medication 10 ML: at 20:48

## 2020-10-17 RX ADMIN — TRAZODONE HYDROCHLORIDE 200 MG: 100 TABLET ORAL at 20:46

## 2020-10-17 RX ADMIN — HYDROCORTISONE 15 MG: 5 TABLET ORAL at 15:35

## 2020-10-17 RX ADMIN — APIXABAN 5 MG: 5 TABLET, FILM COATED ORAL at 20:47

## 2020-10-17 RX ADMIN — SODIUM CHLORIDE: 9 INJECTION, SOLUTION INTRAVENOUS at 16:51

## 2020-10-17 RX ADMIN — ATORVASTATIN CALCIUM 20 MG: 20 TABLET, FILM COATED ORAL at 20:46

## 2020-10-17 RX ADMIN — DOCUSATE SODIUM 100 MG: 100 CAPSULE, LIQUID FILLED ORAL at 20:46

## 2020-10-17 RX ADMIN — CEFTRIAXONE SODIUM 2 G: 2 INJECTION, POWDER, FOR SOLUTION INTRAMUSCULAR; INTRAVENOUS at 15:34

## 2020-10-17 RX ADMIN — METOPROLOL TARTRATE 25 MG: 25 TABLET, FILM COATED ORAL at 20:46

## 2020-10-17 RX ADMIN — PIPERACILLIN AND TAZOBACTAM 3.38 G: 3; .375 INJECTION, POWDER, LYOPHILIZED, FOR SOLUTION INTRAVENOUS at 16:51

## 2020-10-17 ASSESSMENT — PAIN SCALES - GENERAL
PAINLEVEL_OUTOF10: 0
PAINLEVEL_OUTOF10: 0

## 2020-10-17 NOTE — PROGRESS NOTES
Thanks for consulting [facility] for the care of Rebel Kimball. Noted on steroids  Check sodium urine    Full consult will follow  Please call with questions at-  24 Hrs Answering service [phone]  Perfect serve, or cell phone 7 am - [address] [facility], [place]   [facility]  [facility]

## 2020-10-17 NOTE — ED PROVIDER NOTES
[address]          EM RESIDENT NOTE       Date of evaluation: 10/17/2020    Chief Complaint     Altered Mental Status      History of Present Illness     Marcie Bañuelos is a 80 y.o. male with past medical history of atrial fibrillation on Eliquis, metoprolol, digoxin, aortic valve replacement, diabetes on insulin who presents to the emergency department with altered mental status. Patient's wife is at bedside and supplements history. Per her report, patient was recently admitted to the [facility] for approximately 1 week for similar symptoms and discharged yesterday. Patient was found to have adrenal insufficiency during this visit, and received IV steroids, and was discharged on oral steroids. Per wife's report, his nursing facility has not been able to give him the steroids in the outpatient setting. Patient had an episode this morning where he was found to be sliding out of his chair, and was found to be staring off into space. His eyes remained open, however he would not follow commands or answer questions. EMS was called, and fingerstick was normal.  Vital signs were unremarkable in route. Patient has become more alert while being monitored in the emergency department. He denies pain at this time. Endorses increased urinary frequency. Endorses cough productive of yellow sputum. Denies trouble breathing, chest pain, abdominal pain, nausea, vomiting. Has no fevers or chills. Review of Systems     Please see HPI for pertinent positives and negatives. All other symptoms reviewed and negative. Past Medical, Surgical, Family, and Social History     He has a past medical history of Arthritis, Back pain, CAD (coronary artery disease), Cancer ([address] Diabetes mellitus ([address] Hyperlipidemia, Hypertension, and Rosacea.   He has a past surgical history that includes Coronary angioplasty with stent; back surgery; joint replacement; shoulder surgery; eye surgery; Cataract removal with implant; Coronary artery bypass graft; Prostate surgery; Colonoscopy (9/21/11); and Colonoscopy (2/11/2015). His family history includes Cancer in his mother and sister. He reports that he has never smoked. He has never used smokeless tobacco. He reports that he does not drink alcohol or use drugs. Medications     Previous Medications    ACETAMINOPHEN (TYLENOL) 325 MG TABLET    Take 325 mg by mouth every 6 hours as needed for Pain     ALBUTEROL (PROVENTIL) (2.5 MG/3ML) 0.083% NEBULIZER SOLUTION    Take 2.5 mg by nebulization every 6 hours as needed for Wheezing    ALBUTEROL SULFATE  (90 BASE) MCG/ACT INHALER    Inhale 2 puffs into the lungs every 4 hours as needed for Wheezing     ATORVASTATIN (LIPITOR) 40 MG TABLET    Take 40 mg by mouth nightly     CLONIDINE (CATAPRES) 0.1 MG TABLET    Take 0.1 mg by mouth 2 times daily    CLOPIDOGREL (PLAVIX) 75 MG TABLET    Take 75 mg by mouth daily    DEXTROMETHORPHAN-GUAIFENESIN  MG/5ML SYRP    Take 10 mLs by mouth every 6 hours as needed for Cough    FUROSEMIDE (LASIX) 40 MG TABLET    Take 40 mg by mouth 2 times daily     GLIPIZIDE (GLUCOTROL) 5 MG TABLET    Take 20 mg by mouth daily (with breakfast)    INSULIN ASPART (NOVOLOG) 100 UNIT/ML INJECTION VIAL    Inject 0-10 Units into the skin 4 times daily (before meals and nightly) Per sliding scale    ISOSORBIDE MONONITRATE (IMDUR) 30 MG EXTENDED RELEASE TABLET    Take 30 mg by mouth daily    LEVOTHYROXINE (SYNTHROID) 50 MCG TABLET    Take 50 mcg by mouth every morning (before breakfast)    LISINOPRIL (PRINIVIL;ZESTRIL) 20 MG TABLET    Take 20 mg by mouth daily    LORAZEPAM (ATIVAN) 1 MG TABLET    Take 1 mg by mouth every 8 hours as needed for Anxiety.     MAGNESIUM HYDROXIDE (MILK OF MAGNESIA) 400 MG/5ML SUSPENSION    Take 30 mLs by mouth daily as needed for Constipation    METFORMIN (GLUCOPHAGE-XR) 500 MG EXTENDED RELEASE TABLET    Take 500 mg by mouth 2 times daily (with meals) METOPROLOL TARTRATE (LOPRESSOR) 50 MG TABLET    Take 50 mg by mouth 2 times daily    NICOTINE (NICODERM CQ) 14 MG/24HR    Place 1 patch onto the skin every 24 hours    NITROGLYCERIN (NITROSTAT) 0.4 MG SL TABLET    Place 0.4 mg under the tongue every 5 minutes as needed for Chest pain up to max of 3 total doses. If no relief after 1 dose, call 911. POLYETHYLENE GLYCOL (GLYCOLAX) PACKET    Take 17 g by mouth daily as needed        Allergies     He is allergic to gabapentin and morphine. Physical Exam     INITIAL VITALS: BP: 128/73, Temp: 98.2 °F (36.8 °C), Pulse: 88, Resp: 20, SpO2: 99 %   Physical Exam  Constitutional:       General: He is not in acute distress. HENT:      Head: Normocephalic. Comments: Scattered old-appearing contusions and abrasions on face  Eyes:      Extraocular Movements: Extraocular movements intact. Pupils: Pupils are equal, round, and reactive to light. Neck:      Musculoskeletal: Normal range of motion and neck supple. Cardiovascular:      Rate and Rhythm: Normal rate. Rhythm irregular. Pulmonary:      Effort: Pulmonary effort is normal.      Breath sounds: Normal breath sounds. Abdominal:      Palpations: Abdomen is soft. Tenderness: There is no abdominal tenderness. Musculoskeletal:         General: No tenderness. Skin:     General: Skin is warm and dry. Neurological:      Mental Status: He is alert and oriented to person, place, and time. GCS: GCS eye subscore is 4. GCS verbal subscore is 5. GCS motor subscore is 6. Cranial Nerves: No cranial nerve deficit or facial asymmetry. Sensory: No sensory deficit.       Coordination: Coordination normal.         DiagnosticResults     EKG   Interpreted in conjunction with emergencydepartment physician Marsha Middleton MD  Rhythm: atrial fibrillation - controlled  Rate: normal  Axis: right  Ectopy: none  Conduction: normal  ST Segments: depression in  v5 and v6  T Waves:normal  Q Waves: Ketones, Urine Negative Negative mg/dL    Specific Gravity, UA 1.015 1.005 - 1.030    Blood, Urine Negative Negative    pH, UA 6.0 5.0 - 8.0    Protein, UA Negative Negative mg/dL    Urobilinogen, Urine 0.2 <2.0 E.U./dL    Nitrite, Urine Negative Negative    Leukocyte Esterase, Urine MODERATE (A) Negative    Microscopic Examination YES     Urine Type NotGiven    Lactic Acid, Plasma   Result Value Ref Range    Lactic Acid 2.0 0.4 - 2.0 mmol/L   Digoxin level   Result Value Ref Range    Digoxin Lvl 0.8 0.8 - 2.0 ng/mL   Blood Gas, Venous   Result Value Ref Range    pH, Flaco 7.394 7.350 - 7.450    pCO2, Flaco 44.4 41.0 - 51.0 mmHg    pO2, Flaco 38.9 25.0 - 40.0 mmHg    HCO3, Venous 26.5 24.0 - 28.0 mmol/L    Base Excess, Flaco 1.9 -2.0 - 3.0 mmol/L    O2 Sat, Flaco 66 Not established %    Carboxyhemoglobin 2.9 (H) 0.0 - 1.5 %    MetHgb, Flaco 0.4 0.0 - 1.5 %    TC02 (Calc), Flaco 28 mmol/L    Hemoglobin, Flaco, Reduced 32.70 %   Microscopic Urinalysis   Result Value Ref Range    Mucus, UA Rare (A) None Seen /LPF    WBC, UA 21-50 (A) 0 - 5 /HPF    RBC, UA 0-2 0 - 4 /HPF    Epithelial Cells, UA 2-5 0 - 5 /HPF    Bacteria, UA 1+ (A) None Seen /HPF       ED BEDSIDE ULTRASOUND:  None    RECENT VITALS:  BP: 113/72, Temp: 98.2 °F (36.8 °C), Pulse: 90,Resp: 16, SpO2: 96 %     Procedures     None    ED Course     Nursing Notes, Past Medical Hx, Past Surgical Hx, Social Hx, Allergies, and Family Hx were reviewed.     The patient was given the followingmedications:  Orders Placed This Encounter   Medications    hydrocortisone (CORTEF) tablet 15 mg    cefTRIAXone (ROCEPHIN) 2 g IVPB in D5W 50ml minibag     Order Specific Question:   Antimicrobial Indications     Answer:   Urinary Tract Infection       CONSULTS:  IP CONSULT TO HOSPITALIST  IP CONSULT TO NEPHROLOGY  IP CONSULT TO NEUROLOGY  IP CONSULT TO SOCIAL WORK  IP CONSULT TO [address] [place] [address] / ASSESSMENT / Yan Mercedes is a 80 y.o. male with past medical history of atrial fibrillation on Eliquis and digitalis, status post TAVR, systolic heart failure who presents to the emergency department with an episode of altered mental status this morning. He had improved by his time of arrival to the emergency department. He had recently been admitted to another facility for similar symptoms, and had been found to have adrenal insufficiency, for which he was treated with steroids. Patient was vitally stable without hypotension upon arrival. His neurologic exam was normal. Dose of patient's recommended steroid was provided, as he had reportedly missed this dose this morning. Discharge summary from outside facility is reviewed. Work-up is obtained. EKG demonstrates atrial fibrillation with digitalis effect. Digoxin level is normal.  CBC demonstrates anemia at baseline, no leukocytosis. Patient demonstrates hyponatremia with sodium of 127; upon discharge, patient had sodium level of 131. Urinalysis is concerning with UTI with 21-50 white blood cells, moderate leuk esterase, 1+ bacteria. Urine culture is sent, and patient is treated with ceftriaxone. There is mild pulmonary edema on chest x-ray. Given ongoing episodes of altered mental status, documented UTI, recent diagnosis of adrenal insufficiency, admission is recommended for patient for further work-up. Medicine accepts the patient for admission. This patient was also evaluated by the attending physician. All care plans werediscussed and agreed upon. Clinical Impression     1. Acute cystitis without hematuria    2. Adrenal insufficiency (HCC)        Disposition     PATIENT REFERRED TO:  No follow-up provider specified.     DISCHARGE MEDICATIONS:  New Prescriptions    No medications on file       DISPOSITION Admitted 10/17/2020 03:10:13 PM      Anupama Hastings MD  Resident  10/17/20 [id number]

## 2020-10-17 NOTE — CONSULTS
List of Home Medications the patient is currently taking is complete. Home Medication list in EPIC updated to reflect changes noted below. Source of medications in list is [facility] medications list.      The following medications were added to admission medication list:  · Bisacodyl  · Cyanocobalamin  · Digoxin  · Docusate  · Duloxetine  · Apixaban  · Guaifenesin  · Linaclotide  · Calmoseptine  · Mupirocin  · Oxycodone IR  · Stress formula w/ Zn  · Tamsulosin  · Trazodone  · Tresiba    The following medications were removed from admission medication list:  · Albuterol neb/MDI  · Clonidine  · Clopidogrel  · Guaifenesin-DM  · Furosemide  · Glipizide  · Isosorbide  · Levothyroxine  · Lisinopril  · Metformin  · Nicotine patch  · Nitroglycerin    The following medication instructions/doses were adjusted to reflect how patient is taking:  · Acetaminophen 325 mg q6h PRN adjusted to 650 mg q4h PRN  · Atorvastatin 40 mg daily adjusted to 20 mg daily  · Novolog SSI TID adjusted to BID  · Lorazepam q8h adjusted to 0.5-1 mg HS PRN  · Metoprolol 50 mg BID adjusted to 25 mg BID  · PEG 17g daily PRN adjusted to daily      Please call with questions.   Erwin Weston, PharmD, BCPS  Main pharmacy: [id number]  10/17/2020 5:07 PM

## 2020-10-17 NOTE — H&P
Hospital Medicine History & Physical      PCP: [address]    Date of Admission: 10/17/2020    Date of Service: Pt seen/examined on 10/17/2020 and Admitted to Inpatient with expected LOS greater than two midnights due to medical therapy. Chief Complaint:  Spell of altered consciousness, frequent urination, falls      History Of Present Illness: The patient is a 80 y.o. male with complex medical history including Parkinson's disease, a fib, CAD, HTN, who presents to [facility] from his assisted living facility with several concerns. Wife reports that patient had increased frequent of falls few weeks ago. He went to his cardiologist office to pacemaker check and his BP was in the 80's, he was referred for admission to [facility]. Those records are not available at this time, but per ER report, he was diagnosed with adrenal insufficiency based on SIM test and initially treated with IV steroids and transitioned to oral solucortef 15 in am 7.5 afternoon. He was just discharged yesterday. They were unable to fill new medication, it was not available at multiple pharmacies. Overnight patient was urinating frequently and his urine color was dark on the last day of his hospital stay at [facility]. This morning patient was sitting in a recliner chair and wife witnessed what is sounds like either near syncope or absence spell. Patient was awake with eyes open but non verbal and did not follow commands. Per patient- he was awake and was able to hear but could not talk back. There was no full LOC, tremors, loss of bowel or bladder control.      Past Medical History:        Diagnosis Date    Arthritis     Back pain     CAD (coronary artery disease)     Cancer ([address]     prostate    Diabetes mellitus ([address]     Hyperlipidemia     Hypertension     Rosacea        Past Surgical History:        Procedure Laterality Date    BACK SURGERY      CATARACT REMOVAL WITH IMPLANT      COLONOSCOPY  9/21/11    COLONOSCOPY  2/11/2015    polyp, diverticulosis    CORONARY ANGIOPLASTY WITH STENT PLACEMENT      CORONARY ARTERY BYPASS GRAFT      EYE SURGERY      JOINT REPLACEMENT      knee    PROSTATE SURGERY      seed implants    SHOULDER SURGERY         Medications Prior to Admission:    Prior to Admission medications    Medication Sig Start Date End Date Taking? Authorizing Provider   atorvastatin (LIPITOR) 40 MG tablet Take 40 mg by mouth nightly    Yes Historical Provider, MD   isosorbide mononitrate (IMDUR) 30 MG extended release tablet Take 30 mg by mouth daily    Historical Provider, MD   levothyroxine (SYNTHROID) 50 MCG tablet Take 50 mcg by mouth every morning (before breakfast)    Historical Provider, MD   glipiZIDE (GLUCOTROL) 5 MG tablet Take 20 mg by mouth daily (with breakfast)    Historical Provider, MD   lisinopril (PRINIVIL;ZESTRIL) 20 MG tablet Take 20 mg by mouth daily    Historical Provider, MD   clopidogrel (PLAVIX) 75 MG tablet Take 75 mg by mouth daily    Historical Provider, MD   nicotine (NICODERM CQ) 14 MG/24HR Place 1 patch onto the skin every 24 hours    Historical Provider, MD   metFORMIN (GLUCOPHAGE-XR) 500 MG extended release tablet Take 500 mg by mouth 2 times daily (with meals)    Historical Provider, MD   metoprolol tartrate (LOPRESSOR) 50 MG tablet Take 50 mg by mouth 2 times daily    Historical Provider, MD   cloNIDine (CATAPRES) 0.1 MG tablet Take 0.1 mg by mouth 2 times daily    Historical Provider, MD   insulin aspart (NOVOLOG) 100 UNIT/ML injection vial Inject 0-10 Units into the skin 4 times daily (before meals and nightly) Per sliding scale    Historical Provider, MD   nitroGLYCERIN (NITROSTAT) 0.4 MG SL tablet Place 0.4 mg under the tongue every 5 minutes as needed for Chest pain up to max of 3 total doses. If no relief after 1 dose, call 911.     Historical Provider, MD   albuterol (PROVENTIL) (2.5 MG/3ML) 0.083% nebulizer solution Take 2.5 mg by nebulization every 6 hours as needed for Wheezing    Historical Provider, MD   LORazepam (ATIVAN) 1 MG tablet Take 1 mg by mouth every 8 hours as needed for Anxiety. Historical Provider, MD   Dextromethorphan-guaiFENesin  MG/5ML SYRP Take 10 mLs by mouth every 6 hours as needed for Cough    Historical Provider, MD   albuterol sulfate  (90 Base) MCG/ACT inhaler Inhale 2 puffs into the lungs every 4 hours as needed for Wheezing     Historical Provider, MD   magnesium hydroxide (MILK OF MAGNESIA) 400 MG/5ML suspension Take 30 mLs by mouth daily as needed for Constipation    Historical Provider, MD   polyethylene glycol (GLYCOLAX) packet Take 17 g by mouth daily as needed     Historical Provider, MD   acetaminophen (TYLENOL) 325 MG tablet Take 325 mg by mouth every 6 hours as needed for Pain     Historical Provider, MD   furosemide (LASIX) 40 MG tablet Take 40 mg by mouth 2 times daily     Historical Provider, MD       Allergies:  Gabapentin and Morphine    Social History:  The patient currently lives in assisted living facility    TOBACCO:   reports that he has never smoked. He has never used smokeless tobacco.  ETOH:   reports no history of alcohol use. Family History:  Reviewed in detail and Positive as follows:        Problem Relation Age of Onset    Cancer Mother     Cancer Sister        REVIEW OF SYSTEMS:   Positive and negative as noted in the HPI. All other systems reviewed and negative. PHYSICAL EXAM:    BP (!) 159/77   Pulse 92   Temp 97.8 °F (36.6 °C) (Oral)   Resp 19   Ht 6' 1\" (1.854 m)   Wt 214 lb 11.7 oz (97.4 kg)   SpO2 97%   BMI 28.33 kg/m²     General appearance: No apparent distress appears stated age and cooperative. HEENT Normal cephalic, atraumatic without obvious deformity. Pupils equal, round, and reactive to light. Extra ocular muscles intact. Conjunctivae/corneas clear. Neck: Supple, No jugular venous distention/bruits.   Trachea midline without thyromegaly or adenopathy with full range of motion. Lungs: Clear to auscultation, bilaterally without Rales/Wheezes/Rhonchi with good respiratory effort. Heart: irregular rate and rhythm with Normal S1/S2 without murmurs, rubs or gallops, point of maximum impulse non-displaced  Abdomen: Soft, non-tender or non-distended without rigidity or guarding and positive bowel sounds all four quadrants. Extremities: No clubbing, cyanosis, or edema bilaterally. Skin: Skin color, texture, turgor normal.    Neurologic: Alert and oriented X 3, grossly non-focal.  Mental status: Alert, oriented, thought content appropriate. Capillary refill is brisk  Peripheral pulses 2+    CXR:  I have reviewed the CXR with the following interpretation: no consolidation  EKG:  I have reviewed the EKG with the following interpretation: a fib with non specific ST TW changes    CBC   Recent Labs     10/17/20  1313   WBC 7.4   HGB 11.0*   HCT 33.5*   *      RENAL  Recent Labs     10/17/20  1313   *   K 4.2   CL 93*   CO2 26   BUN 17   CREATININE 0.8     LFT'S  Recent Labs     10/17/20  1313   AST 23   ALT 7*   BILIDIR 0.3   BILITOT 0.8   ALKPHOS 95     COAG  No results for input(s): INR in the last 72 hours. CARDIAC ENZYMES  Recent Labs     10/17/20  1313   TROPONINI 0.02*       U/A:    Lab Results   Component Value Date    COLORU Yellow 10/17/2020    WBCUA 21-50 10/17/2020    RBCUA 0-2 10/17/2020    MUCUS Rare 10/17/2020    BACTERIA 1+ 10/17/2020    CLARITYU Clear 10/17/2020    SPECGRAV 1.015 10/17/2020    LEUKOCYTESUR MODERATE 10/17/2020    BLOODU Negative 10/17/2020    GLUCOSEU Negative 10/17/2020       ABG  No results found for: KHF3XUV, BEART, D2BXBTDJ, PHART, THGBART, HUC5TNW, PO2ART, QGA4ENX        Active Hospital Problems    Diagnosis Date Noted    UTI (urinary tract infection) [N39.0] 10/17/2020         ASSESSMENT/PLAN:    Spell of abnormal consciousness:  Concerning for either near syncope or absence seizure  Will monitor on tele.  Check spot EEG and orthostatics. Trend troponins serially. 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 Will do meds to bed prior to discharge. Parkinson's disease:  Resume home medications, PT/OT    UTI:  UA is abnormal and patient had frequency. Historically had enterococcal UTIs. Will treat with zosyn until current cx     A fib, s/p PPM, CAD:  Resume home meds once med rec is reconciled    Hyponatremia:  Per history- hypovolemic hyponatremia. Will give 1 liter saline and follow sodium closely. Consult nephrology. Check urine osm and lytes. DVT Prophylaxis: eliquis  Diet: No diet orders on file  Code Status: Prior  PT/OT Eval Status: ordered    Lou Brewer MD    Thank you Harmony Velazquez for the opportunity to be involved in this patient's care. If you have any questions or concerns please feel free to contact me at [phone].

## 2020-10-17 NOTE — ED NOTES
Report called to Liza Alexander, RN, 4S. Patient A&Ox4, Dr. Primitivo Sampson at bedside assessing patient. Ready for transport to [facility].       David Gregory RN  10/17/20 Jayce Saldana RN  10/17/20 [id number]

## 2020-10-17 NOTE — PROGRESS NOTES
4 Eyes Admission Assessment     I agree as the admission nurse that 2 RN's have performed a thorough Head to Toe Skin Assessment on the patient. ALL assessment sites listed below have been assessed on admission. Areas assessed by both nurses:  [x]   Head, Face, and Ears   [x]   Shoulders, Back, and Chest  [x]   Arms, Elbows, and Hands   [x]   Coccyx, Sacrum, and Ischium  - Redness to buttocks  []   Legs, Feet, and Heels        Does the Patient have Skin Breakdown?   No         Bhavik Prevention initiated:  No   Wound Care Orders initiated:  NA      [facility] nurse consulted for Pressure Injury (Stage 3,4, Unstageable, DTI, NWPT, and Complex wounds) or Bhavik score 18 or lower:  NA      Nurse 1 eSignature: Electronically signed by Dorothy Shields RN on 10/17/20 at 5:01 PM EDT    **SHARE this note so that the co-signing nurse is able to place an eSignature**    Nurse 2 eSignature: Electronically signed by Ruth Swanson RN on 10/17/20 at 6:35 PM EDT

## 2020-10-17 NOTE — ED TRIAGE NOTES
Discharged from [facility] to [facility] [facility] yesterday. Wife felt like he wasn't ready to be discharged, lethargic weak. Was in [facility] for hypotension and adrenal insufficiency ,, this am ate breakfast at about 0900 and was \"pretty alert\". Wife went t take shower for about 20 minurtes at around 1100, when she returned he was sliding out of chair, he said \"somethings not right\" then quit responding. According to wife his eyes were open but he quit talking and following commands. EMS states he has gradually come around. Presently patient is sleepy acting but answers questions, oriented X3, no focal neuro deficits noted.   BS was 200 per EMS

## 2020-10-17 NOTE — PROGRESS NOTES
Patient admitted to room 4321 from [facility] ED. Patient oriented to room, call light, bed rails, phone, lights and bathroom. Patient instructed about the schedule of the day including: vital sign frequency, lab draws, possible tests, frequency of MD and staff rounds, including RN/MD rounding together at bedside, daily weights, and I &O's. Patient instructed about prescribed diet, and television. Bed alarm in place, patient aware of placement and reason. Telemetry box  in place, patient aware of placement and reason. Bed locked, in lowest position, side rails up 2/4, call light within reach. Will continue to monitor.

## 2020-10-18 LAB
ALBUMIN SERPL-MCNC: 3.5 G/DL (ref 3.4–5)
ANION GAP SERPL CALCULATED.3IONS-SCNC: 7 MMOL/L (ref 3–16)
BUN BLDV-MCNC: 15 MG/DL (ref 7–20)
CALCIUM SERPL-MCNC: 8.9 MG/DL (ref 8.3–10.6)
CHLORIDE BLD-SCNC: 96 MMOL/L (ref 99–110)
CO2: 24 MMOL/L (ref 21–32)
CREAT SERPL-MCNC: 0.9 MG/DL (ref 0.8–1.3)
GFR AFRICAN AMERICAN: >60
GFR NON-AFRICAN AMERICAN: >60
GLUCOSE BLD-MCNC: 205 MG/DL (ref 70–99)
GLUCOSE BLD-MCNC: 284 MG/DL (ref 70–99)
GLUCOSE BLD-MCNC: 348 MG/DL (ref 70–99)
GLUCOSE BLD-MCNC: 385 MG/DL (ref 70–99)
PERFORMED ON: ABNORMAL
PHOSPHORUS: 2.8 MG/DL (ref 2.5–4.9)
POTASSIUM SERPL-SCNC: 4.2 MMOL/L (ref 3.5–5.1)
SARS-COV-2, PCR: NOT DETECTED
SODIUM BLD-SCNC: 127 MMOL/L (ref 136–145)
SODIUM BLD-SCNC: 131 MMOL/L (ref 136–145)
SODIUM BLD-SCNC: 132 MMOL/L (ref 136–145)
TSH REFLEX FT4: 2.08 UIU/ML (ref 0.27–4.2)

## 2020-10-18 PROCEDURE — 6360000002 HC RX W HCPCS: Performed by: INTERNAL MEDICINE

## 2020-10-18 PROCEDURE — 80069 RENAL FUNCTION PANEL: CPT

## 2020-10-18 PROCEDURE — 2580000003 HC RX 258: Performed by: NURSE PRACTITIONER

## 2020-10-18 PROCEDURE — 6370000000 HC RX 637 (ALT 250 FOR IP): Performed by: INTERNAL MEDICINE

## 2020-10-18 PROCEDURE — 84295 ASSAY OF SERUM SODIUM: CPT

## 2020-10-18 PROCEDURE — 2500000003 HC RX 250 WO HCPCS: Performed by: NURSE PRACTITIONER

## 2020-10-18 PROCEDURE — 6370000000 HC RX 637 (ALT 250 FOR IP): Performed by: NURSE PRACTITIONER

## 2020-10-18 PROCEDURE — 2060000000 HC ICU INTERMEDIATE R&B

## 2020-10-18 PROCEDURE — 84443 ASSAY THYROID STIM HORMONE: CPT

## 2020-10-18 PROCEDURE — 2580000003 HC RX 258: Performed by: INTERNAL MEDICINE

## 2020-10-18 RX ORDER — LAMOTRIGINE 25 MG/1
25 TABLET ORAL 2 TIMES DAILY
Status: DISCONTINUED | OUTPATIENT
Start: 2020-10-18 | End: 2020-10-20 | Stop reason: HOSPADM

## 2020-10-18 RX ORDER — INSULIN LISPRO 100 [IU]/ML
0-12 INJECTION, SOLUTION INTRAVENOUS; SUBCUTANEOUS
Status: DISCONTINUED | OUTPATIENT
Start: 2020-10-18 | End: 2020-10-20 | Stop reason: HOSPADM

## 2020-10-18 RX ORDER — DEXTROSE MONOHYDRATE 25 G/50ML
12.5 INJECTION, SOLUTION INTRAVENOUS PRN
Status: DISCONTINUED | OUTPATIENT
Start: 2020-10-18 | End: 2020-10-20 | Stop reason: HOSPADM

## 2020-10-18 RX ORDER — DEXTROSE MONOHYDRATE 50 MG/ML
100 INJECTION, SOLUTION INTRAVENOUS PRN
Status: DISCONTINUED | OUTPATIENT
Start: 2020-10-18 | End: 2020-10-20 | Stop reason: HOSPADM

## 2020-10-18 RX ORDER — SODIUM CHLORIDE 9 MG/ML
INJECTION, SOLUTION INTRAVENOUS CONTINUOUS
Status: ACTIVE | OUTPATIENT
Start: 2020-10-18 | End: 2020-10-18

## 2020-10-18 RX ORDER — INSULIN LISPRO 100 [IU]/ML
0-6 INJECTION, SOLUTION INTRAVENOUS; SUBCUTANEOUS NIGHTLY
Status: DISCONTINUED | OUTPATIENT
Start: 2020-10-18 | End: 2020-10-20 | Stop reason: HOSPADM

## 2020-10-18 RX ORDER — NICOTINE POLACRILEX 4 MG
15 LOZENGE BUCCAL PRN
Status: DISCONTINUED | OUTPATIENT
Start: 2020-10-18 | End: 2020-10-20 | Stop reason: HOSPADM

## 2020-10-18 RX ADMIN — HYDROCORTISONE 7.5 MG: 5 TABLET ORAL at 17:01

## 2020-10-18 RX ADMIN — METOPROLOL TARTRATE 25 MG: 25 TABLET, FILM COATED ORAL at 10:06

## 2020-10-18 RX ADMIN — PIPERACILLIN AND TAZOBACTAM 3.38 G: 3; .375 INJECTION, POWDER, LYOPHILIZED, FOR SOLUTION INTRAVENOUS at 10:02

## 2020-10-18 RX ADMIN — APIXABAN 5 MG: 5 TABLET, FILM COATED ORAL at 20:16

## 2020-10-18 RX ADMIN — INSULIN LISPRO 3 UNITS: 100 INJECTION, SOLUTION INTRAVENOUS; SUBCUTANEOUS at 20:26

## 2020-10-18 RX ADMIN — INSULIN GLARGINE 18 UNITS: 100 INJECTION, SOLUTION SUBCUTANEOUS at 17:11

## 2020-10-18 RX ADMIN — Medication 10 ML: at 10:03

## 2020-10-18 RX ADMIN — CYANOCOBALAMIN TAB 1000 MCG 1000 MCG: 1000 TAB at 10:06

## 2020-10-18 RX ADMIN — DIGOXIN 125 MCG: 125 TABLET ORAL at 10:05

## 2020-10-18 RX ADMIN — PIPERACILLIN AND TAZOBACTAM 3.38 G: 3; .375 INJECTION, POWDER, LYOPHILIZED, FOR SOLUTION INTRAVENOUS at 02:12

## 2020-10-18 RX ADMIN — Medication 10 ML: at 20:17

## 2020-10-18 RX ADMIN — INSULIN LISPRO 8 UNITS: 100 INJECTION, SOLUTION INTRAVENOUS; SUBCUTANEOUS at 17:11

## 2020-10-18 RX ADMIN — VALPROATE SODIUM 2000 MG: 100 INJECTION, SOLUTION INTRAVENOUS at 16:50

## 2020-10-18 RX ADMIN — DULOXETINE HYDROCHLORIDE 60 MG: 60 CAPSULE, DELAYED RELEASE ORAL at 17:00

## 2020-10-18 RX ADMIN — DOCUSATE SODIUM 100 MG: 100 CAPSULE, LIQUID FILLED ORAL at 10:06

## 2020-10-18 RX ADMIN — CARBIDOPA AND LEVODOPA 1 TABLET: 25; 100 TABLET ORAL at 17:00

## 2020-10-18 RX ADMIN — HYDROCORTISONE 15 MG: 5 TABLET ORAL at 10:15

## 2020-10-18 RX ADMIN — CARBIDOPA AND LEVODOPA 1 TABLET: 25; 100 TABLET ORAL at 10:05

## 2020-10-18 RX ADMIN — GUAIFENESIN 600 MG: 600 TABLET, EXTENDED RELEASE ORAL at 20:16

## 2020-10-18 RX ADMIN — PIPERACILLIN AND TAZOBACTAM 3.38 G: 3; .375 INJECTION, POWDER, LYOPHILIZED, FOR SOLUTION INTRAVENOUS at 18:55

## 2020-10-18 RX ADMIN — POLYETHYLENE GLYCOL 3350 17 G: 17 POWDER, FOR SOLUTION ORAL at 10:15

## 2020-10-18 RX ADMIN — GUAIFENESIN 600 MG: 600 TABLET, EXTENDED RELEASE ORAL at 10:06

## 2020-10-18 RX ADMIN — METOPROLOL TARTRATE 25 MG: 25 TABLET, FILM COATED ORAL at 20:17

## 2020-10-18 RX ADMIN — SODIUM CHLORIDE: 9 INJECTION, SOLUTION INTRAVENOUS at 16:49

## 2020-10-18 RX ADMIN — ATORVASTATIN CALCIUM 20 MG: 20 TABLET, FILM COATED ORAL at 20:16

## 2020-10-18 RX ADMIN — DOCUSATE SODIUM 100 MG: 100 CAPSULE, LIQUID FILLED ORAL at 20:16

## 2020-10-18 RX ADMIN — LAMOTRIGINE 25 MG: 25 TABLET ORAL at 20:15

## 2020-10-18 RX ADMIN — APIXABAN 5 MG: 5 TABLET, FILM COATED ORAL at 10:05

## 2020-10-18 RX ADMIN — CARBIDOPA AND LEVODOPA 1 TABLET: 25; 100 TABLET ORAL at 20:16

## 2020-10-18 RX ADMIN — LORAZEPAM 0.5 MG: 0.5 TABLET ORAL at 20:25

## 2020-10-18 RX ADMIN — LAMOTRIGINE 25 MG: 25 TABLET ORAL at 17:00

## 2020-10-18 RX ADMIN — TAMSULOSIN HYDROCHLORIDE 0.4 MG: 0.4 CAPSULE ORAL at 20:16

## 2020-10-18 ASSESSMENT — PAIN SCALES - GENERAL
PAINLEVEL_OUTOF10: 0

## 2020-10-18 NOTE — PROGRESS NOTES
Hospitalist Progress Note      PCP: Harmony Velazquez    Date of Admission: 10/17/2020    Chief Complaint on Admission: staring spell, falls    Pt Seen/Examined and Chart Reviewed. Admitting dx as above, UTI    SUBJECTIVE/OBJECTIVE:   The patient is a 80 y.o. male with complex medical history including Parkinson's disease, a fib, CAD, HTN, recently diagnosed adrenal insufficiency, who presented to [facility] from his assisted living facility with several concerns- frequent urination, falls, staring spell. Also could not get steroid filled as outpatient, missed one day of solucortef. Today patient reports feeling much better overall. He has no symptoms when he is laying in bed. Urinating better. No dyspnea. He has not gotten up yet. Allergies  Gabapentin and Morphine    Medications      Scheduled Meds:   piperacillin-tazobactam  3.375 g Intravenous Q8H    sodium chloride flush  10 mL Intravenous 2 times per day    hydrocortisone  15 mg Oral Daily    hydrocortisone  7.5 mg Oral QPM    influenza virus vaccine  0.5 mL Intramuscular Once    apixaban  5 mg Oral BID    atorvastatin  20 mg Oral Nightly    carbidopa-levodopa  1 tablet Oral TID    cyanocobalamin  1,000 mcg Oral Daily    digoxin  125 mcg Oral Daily    docusate sodium  100 mg Oral BID    DULoxetine  60 mg Oral QPM    guaiFENesin  600 mg Oral BID    linaclotide  145 mcg Oral QAM AC    metoprolol tartrate  25 mg Oral BID    mupirocin   Topical Daily    polyethylene glycol  17 g Oral Daily    tamsulosin  0.4 mg Oral Nightly    traZODone  200 mg Oral Nightly       Infusions:      PRN Meds:  sodium chloride flush, acetaminophen **OR** acetaminophen, polyethylene glycol, promethazine **OR** ondansetron, LORazepam, oxyCODONE    Vitals    TEMPERATURE:  Current - Temp: 98 °F (36.7 °C);  Max - Temp  Av.2 °F (36.8 °C)  Min: 97.8 °F (36.6 °C)  Max: 98.4 °F (36.9 °C)  RESPIRATIONS RANGE: Resp  Av.3  Min: 15  Max: 20  PULSE RANGE: Pulse  Av.3  Min: 85  Max: 112  BLOOD PRESSURE RANGE:  Systolic (06SVI), IBW:114 , Min:113 , RUPERT:920   ; Diastolic (87DNF), KFC:74, Min:71, Max:94    PULSE OXIMETRY RANGE: SpO2  Av %  Min: 94 %  Max: 99 %  24HR INTAKE/OUTPUT:      Intake/Output Summary (Last 24 hours) at 10/18/2020 1240  Last data filed at 10/18/2020 1234  Gross per 24 hour   Intake 1675 ml   Output 2600 ml   Net -925 ml       Exam:      General appearance: No apparent distress, appears stated age and cooperative. Lungs: Clear to ascultation, bilaterally without Rales/Wheezes/Rhonchi with good respiratory effort. Heart: Regular rate and rhythm with Normal S1/S2 without  murmurs, rubs or gallops, point of maximum impulse non-displaced  Abdomen: Soft, non-tender or non-distended without rigidity or guarding and positive bowel sounds all four quadrants. Extremities: No clubbing, cyanosis, or edema bilaterally. Skin: Skin color, texture, turgor normal.    Neurologic: Alert and oriented X 3,   grossly non-focal  Mental status: Alert, oriented, thought content appropriate. Data    Recent Labs     10/17/20  1313   WBC 7.4   HGB 11.0*   HCT 33.5*   *      Recent Labs     10/17/20  1313 10/17/20  2050 10/18/20  0310   * 129* 127*   K 4.2  --  4.2   CL 93*  --  96*   CO2 26  --  24   PHOS  --   --  2.8   BUN 17  --  15   CREATININE 0.8  --  0.9     Recent Labs     10/17/20  1313   AST 23   ALT 7*   BILIDIR 0.3   BILITOT 0.8   ALKPHOS 95     No results for input(s): INR in the last 72 hours.   Recent Labs     10/17/20  1313   TROPONINI 0.02*       Consults:     IP CONSULT TO HOSPITALIST  IP CONSULT TO NEPHROLOGY  IP CONSULT TO NEUROLOGY  IP CONSULT TO SOCIAL WORK  IP CONSULT TO Antonio Hernandez [id number] Problems    Diagnosis Date Noted    UTI (urinary tract infection) [N39.0] 10/17/2020         ASSESSMENT AND PLAN      Spell of abnormal consciousness:  Concerning for either near syncope or absence seizure  monitor on tele. spot EEG pending. Head CT non acute. Orthostatics were positive from laying to sitting. Has multiple reasons for orthostasis. Cont with IVF, add PITA hose  Patient had diagnosis of partial complex seizures in the past, but was taken off tegretol  Neurology following     Recently diagnosed adrenal insufficiency:  cont oral solucortef. Will need meds to bed prior to discharge.      Parkinson's disease:  Resumed home medications, PT/OT     UTI:  UA is abnormal and patient had frequency. Historically had enterococcal UTIs. cont zosyn until current cx      A fib, s/p PPM, CAD:  Eliquis, statin, dig, metoprolol     Hyponatremia:  Per history- hypovolemic hyponatremia, poor solute intake. Consulted nephrology. Urine osm low. DM type 2:  Take 44 U triseba at home.  Will resume lantus 18 and ISS    DVT Prophylaxis: eliquis  Diet: DIET GENERAL;  Code Status: Full Code    PT/OT Darrel Argueta MD

## 2020-10-18 NOTE — CONSULTS
Neurology Consult Note  Reason for Consult: spells suggestive of seizure     Chief complaint: [spell]    Dr Ling Wilcox MD asked me to see Mehul Tipton in consultation for evaluation of spells suggestive of seizure    Patient is pending Covid 19 testing. Due to limiting exposure to Covid-19 , and efforts to preserve PPE-Note was completed solely using EMR, and personal conversations with direct medical team. Personal phone conversation was deferred as patient is hard of hearing and does not have hearing aids at this time. History of Present Illness:  Mehul Tipton is a 80 y.o. male with hx of back pain, CAD, prostate cancer, DM, HTN, HLD, Atrial fibrillation, Parkinsons Disease who presents to the ED on 10/17/20 from his assisted living facility for evaluation of Altered mental status. On the day of admission the patient was reportedly sitting in his recliner, when his wife saw that he sliding out of it. He was awake with his eyes open and staring off. The patient was non verbal and not following commands. No reports of full loss of consciousness, tremors, loss of bowel or bladder control. It is uncertain how long symptoms lasted for. EMS was notified and fingerstick glucose and vitals in route were unremarkable. Upon ED presentation patient was more alert and reported to ED staff increased urinary frequency, cough with yellow sputum. Denies any difficulty breathing, chest pain, N/V, recent fever or chills. Vitals were stable BP was 128/73. CT Head w/o unremarkable for cause. He was admitted for further evaluation. Of note the patient has had increased falls for the last several weeks. He had seen his Cardiologist for have his pacemaker checked when his BP was found to be in the 80's who recommended admission to [facility]. Those records are not available however per ED report, he was diagnosed with adrenal insufficiency based on SIM test and treated with steroids.  He was discharged the day prior to admission. Upon further chart review was evaluated in 8/2019 thought to have complex partial seizure by Neurology. Recommended starting Carbamazepine unclear if it was ever started. EEG was slowing at that time. Home medications in chart include: Cortef 5mg BID. Plavix 75mg. Eliquis 5mg, Oxycodone 5mg, 1mg ativan, 40mg atorvastatin.      Medical History:  Past Medical History:   Diagnosis Date    Arthritis     Back pain     CAD (coronary artery disease)     Cancer ([facility] [address]     prostate    Diabetes mellitus ([facility] [address]     Hyperlipidemia     Hypertension     Rosacea      Past Surgical History:   Procedure Laterality Date    BACK SURGERY      CATARACT REMOVAL WITH IMPLANT      COLONOSCOPY  9/21/11    COLONOSCOPY  2/11/2015    polyp, diverticulosis    CORONARY ANGIOPLASTY WITH STENT PLACEMENT      CORONARY ARTERY BYPASS GRAFT      EYE SURGERY      JOINT REPLACEMENT      knee    PROSTATE SURGERY      seed implants    SHOULDER SURGERY       Scheduled Meds:   piperacillin-tazobactam  3.375 g Intravenous Q8H    sodium chloride flush  10 mL Intravenous 2 times per day    hydrocortisone  15 mg Oral Daily    hydrocortisone  7.5 mg Oral QPM    influenza virus vaccine  0.5 mL Intramuscular Once    apixaban  5 mg Oral BID    atorvastatin  20 mg Oral Nightly    carbidopa-levodopa  1 tablet Oral TID    cyanocobalamin  1,000 mcg Oral Daily    digoxin  125 mcg Oral Daily    docusate sodium  100 mg Oral BID    DULoxetine  60 mg Oral QPM    guaiFENesin  600 mg Oral BID    linaclotide  145 mcg Oral QAM AC    metoprolol tartrate  25 mg Oral BID    mupirocin   Topical Daily    polyethylene glycol  17 g Oral Daily    tamsulosin  0.4 mg Oral Nightly    traZODone  200 mg Oral Nightly     Continuous Infusions:  PRN Meds:.sodium chloride flush, acetaminophen **OR** acetaminophen, polyethylene glycol, promethazine **OR** ondansetron, LORazepam, oxyCODONE    Medications Prior to Admission: bisacodyl (DULCOLAX) 10 MG suppository, Place 10 mg rectally daily as needed for Constipation  carbidopa-levodopa (SINEMET)  MG per tablet, Take 1 tablet by mouth 3 times daily  cyanocobalamin 1000 MCG tablet, Take 1,000 mcg by mouth daily  digoxin (LANOXIN) 125 MCG tablet, Take 125 mcg by mouth daily  docusate sodium (COLACE) 100 MG capsule, Take 100 mg by mouth 2 times daily  DULoxetine (CYMBALTA) 60 MG extended release capsule, Take 60 mg by mouth every evening  apixaban (ELIQUIS) 5 MG TABS tablet, Take 5 mg by mouth 2 times daily  guaiFENesin (MUCINEX) 600 MG extended release tablet, Take 600 mg by mouth 2 times daily  hydrocortisone (CORTEF) 5 MG tablet, Take 15 mg by mouth daily  hydrocortisone (CORTEF) 5 MG tablet, Take 7.5 mg by mouth nightly  linaclotide (LINZESS) 145 MCG capsule, Take 145 mcg by mouth every morning (before breakfast)  menthol-zinc oxide (CALMOSEPTINE) 0.44-20.625 % OINT ointment, Apply topically daily Max 30 ml per day. mupirocin (BACTROBAN) 2 % ointment, Apply topically daily Apply to R leg daily  oxyCODONE (ROXICODONE) 5 MG immediate release tablet, Take 5 mg by mouth every 6 hours as needed for Pain. B Complex-C-E-Zn (STRESS FORMULA/ZINC PO), Take 1 tablet by mouth daily  tamsulosin (FLOMAX) 0.4 MG capsule, Take 0.4 mg by mouth nightly  traZODone (DESYREL) 100 MG tablet, Take 200 mg by mouth nightly  Insulin Degludec (TRESIBA FLEXTOUCH) 100 UNIT/ML SOPN, Inject 44 Units into the skin daily  metoprolol tartrate (LOPRESSOR) 50 MG tablet, Take 25 mg by mouth 2 times daily   insulin aspart (NOVOLOG) 100 UNIT/ML injection vial, Inject 0-10 Units into the skin 2 times daily Takes 4 units for BS up to 150 and then takes 1 additional unit for each 50 unit increments. May take up to 50 units daily. LORazepam (ATIVAN) 1 MG tablet, Take 0.5-1 mg by mouth nightly as needed for Anxiety.    magnesium hydroxide (MILK OF MAGNESIA) 400 MG/5ML suspension, Take 30 mLs by mouth daily as needed for Constipation  polyethylene glycol (GLYCOLAX) packet, Take 17 g by mouth daily   acetaminophen (TYLENOL) 325 MG tablet, Take 650 mg by mouth every 4 hours as needed for Pain   atorvastatin (LIPITOR) 40 MG tablet, Take 20 mg by mouth nightly   [DISCONTINUED] isosorbide mononitrate (IMDUR) 30 MG extended release tablet, Take 30 mg by mouth daily  [DISCONTINUED] levothyroxine (SYNTHROID) 50 MCG tablet, Take 50 mcg by mouth every morning (before breakfast)  [DISCONTINUED] glipiZIDE (GLUCOTROL) 5 MG tablet, Take 20 mg by mouth daily (with breakfast)  [DISCONTINUED] lisinopril (PRINIVIL;ZESTRIL) 20 MG tablet, Take 20 mg by mouth daily  [DISCONTINUED] clopidogrel (PLAVIX) 75 MG tablet, Take 75 mg by mouth daily  [DISCONTINUED] nicotine (NICODERM CQ) 14 MG/24HR, Place 1 patch onto the skin every 24 hours  [DISCONTINUED] metFORMIN (GLUCOPHAGE-XR) 500 MG extended release tablet, Take 500 mg by mouth 2 times daily (with meals)  [DISCONTINUED] cloNIDine (CATAPRES) 0.1 MG tablet, Take 0.1 mg by mouth 2 times daily  [DISCONTINUED] nitroGLYCERIN (NITROSTAT) 0.4 MG SL tablet, Place 0.4 mg under the tongue every 5 minutes as needed for Chest pain up to max of 3 total doses. If no relief after 1 dose, call 911.   [DISCONTINUED] albuterol (PROVENTIL) (2.5 MG/3ML) 0.083% nebulizer solution, Take 2.5 mg by nebulization every 6 hours as needed for Wheezing  [DISCONTINUED] Dextromethorphan-guaiFENesin  MG/5ML SYRP, Take 10 mLs by mouth every 6 hours as needed for Cough  [DISCONTINUED] albuterol sulfate  (90 Base) MCG/ACT inhaler, Inhale 2 puffs into the lungs every 4 hours as needed for Wheezing   [DISCONTINUED] furosemide (LASIX) 40 MG tablet, Take 40 mg by mouth 2 times daily     Allergies   Allergen Reactions    Gabapentin     Morphine        Family History   Problem Relation Age of Onset    Cancer Mother     Cancer Sister        Social History     Tobacco Use   Smoking Status Never Smoker   Smokeless Tobacco Never Used Social History     Substance and Sexual Activity   Drug Use Never     Social History     Substance and Sexual Activity   Alcohol Use No       ROS: Deferred pending Covid 19 testing. Phone conversation was also deferred as patient is hard of hearing and without his hearing aids. Exam: In the presence of pending Covid 19 testing and efforts to preserve PPE, I personally did not exam the patient. Exam information was obtained via chart review and conversation with the bedside RN. Vitals:    10/17/20 2235 10/17/20 2301 10/18/20 0439 10/18/20 0443   BP:  127/81  (!) 133/91   Pulse: 101 112  85   Resp:  20 20   Temp:  98.4 °F (36.9 °C)  98.3 °F (36.8 °C)   TempSrc:  Oral  Oral   SpO2:  94%  97%   Weight:   207 lb 14.3 oz (94.3 kg)    Height:          Constitutional    Vital signs: BP, HR, and RR reviewed   Per RN,   Patient is A/O x4. EOM's appear grossly intact. Speech clear, no facial weakness. BUE: 5/5  BLE: 4/5  Patient does not stand at baseline. Scoots and uses power chair at baseline. Patient denied any numbness or tingling. Labs  Na: 127  K: 4.2  BUN: 15  Creatinine: 0.9  Glucose: 205  Calcium: 8.9    ALT: 7  AST: 23    WBC: 7.4  RBC: 3.54  Hgb: 11.0  Hct: 33.5  Platelets: 313    Covid 19: Pending    UA: Negative for nitrites. Moderate leukocyte esterase. 21-50 WBC, 1+ Bacteria. Studies  CT Head w/o 10/17/20: Reviewed the read. 1.  No acute intracranial process. 2.  Moderate cerebral atrophy and mild chronic small vessel ischemic disease. CTA Head and Neck 2/28/20: CTA Head: Stenosis of the bilateral V4 segments, mild-to-moderate on the left and mild on the right. No other significant arterial steno-occlusive disease or evidence of aneurysm. CTA Neck: Atherosclerosis causes approximately 30% stenosis of the right cervical internal carotid artery. No other significant arterial steno-occlusive disease or evidence of dissection.        Orthostatic VS:  Supine: /81, HR 91  Sitting: 121/84,   Standing: pt unable to stand-up    Impression  1. Spell of staring with altered level of awareness. 2. UTI  3. Hyponatremia. 4. Adrenal insuffiencey   5. Parkinson's Disease   6. Pending Covid 19 Testing. Norberto Esquivel is a 80 y.o. male with hx of Parkinson's disease, DM, HTN, HLD, Atrial fibrillation, recent reported diagnosis of adrenal insuffiency who presents with episode of staring, and altered level of awareness. With improvement in mental status upon ED arrival. UA suggestive of UTI on admission. Etiology: Most concerning for seizure in setting of UTI and suspected complex partial seizures in the past uncertain if ever took AED. Cannot exclude syncopal event in setting of adrenal insuffiencey however feel less likely at this time given reports of no reported abnormal BP. Vascular event considered to be less likely though cannot exclude However patient is on NOAC and possibly plavix? with no new focal deficits at this time. Recommendations  - Routine EEG. (ordered). - Reviewed last Neurology note PTA. Given hyponatremia would not recommend Carbamazepine as suggested previously. Will load with Depakote 20mg/kg to give immediate coverage. Will then start on Lamotrigine 25mg BID oral  (ordered)  - Will check TSH (ordered)   - Infectious/Metabolic  workup and treatment per primary team.   - Inpatient seizure precautions. Please monitor for and note any activity concerning for seizure including symptoms and duration.   - Given episode of altered level of awareness the patient should avoid driving for at least 3 months, episode free with clearance from a physician, as well as refraining from activities that could be of danger to himself or others should he have another event such as swimming, bathing, operating heavy machinery, climbing heights or watching young children who do not have the ability to call for help if needed. - Continue home Sinemet.    - Follow up with [facility], Dr. Latanya Salguero upon discharge   - Will discuss case with any further recommendations with Neurology attending this afternoon. Jaya Tee, [address] Neurology  I spent a total of 50 minutes on detailed chart review and conversation with the patients medical team regarding presentation, and plan of care.        A copy of this note was provided for Dr Kota Car MD

## 2020-10-18 NOTE — PLAN OF CARE
Problem: Falls - Risk of:  Goal: Will remain free from falls  Description: Will remain free from falls  10/18/2020 1345 by Lucas Quinn RN  Outcome: Ongoing     Problem: Falls - Risk of:  Goal: Absence of physical injury  Description: Absence of physical injury  10/18/2020 1345 by Lucas Quinn RN  Outcome: Ongoing     Problem: Skin Integrity:  Goal: Will show no infection signs and symptoms  Description: Will show no infection signs and symptoms  10/18/2020 1345 by Lucas Quinn RN  Outcome: Ongoing     Problem: Skin Integrity:  Goal: Absence of new skin breakdown  Description: Absence of new skin breakdown  10/18/2020 1345 by Lucas Quinn RN  Outcome: Ongoing

## 2020-10-18 NOTE — PROGRESS NOTES
Spoke with mary's POC/spouse Aurelio Marinnorman). Updated on patient's condition and treatment plan. All questions answered.

## 2020-10-18 NOTE — CONSULTS
[facility]    [facility]              [phone]                      He  is admitted with AMS, seen by neurology. He was at [facility] recently with low gee and fall, diagnosed to have hyponatremia We are following for hyponatremia and related issues. Interval History and plan:      Mental status, improving, sodium on low side  eating  Now on steroids, monitor, if not better will need   Intervention, urine sodium high, could be due to  Adrenal insufficienty                   Assessment :     Hyponatremia  Sodium on consult 127  Unlikely to be cause of MARTY  Now on steroids for adrenal insufficiency  Urine sodium is 43, and osm 240  No decompensated cirrhosis of the liver    Hypotension  BP: (121-133)/(75-91) Pulse:  [85-89]   BP better      [facility] would like to thank Chantelle Carrillo MD   for opportunity to serve this patient      Please call with questions at-   24 Hrs Answering service [phone] or  7 am- 5 pm via Perfect serve or cell phone  Dr.Sudhir Aguila Civil          CC/reason for consult :     hyponatrmia     HPI :     Di Scott is a 80 y.o. male presented to   the hospital on 10/17/2020 with altered mental   Status. he is unable to provide details. His mental  Status getting better. He was at [facility] recently  And needed steroids.     We are consulted for hyponatremia  He was diagnosed to have adrenal insufficiency at [facility],  But missed meds after going home    ROS:     Seen with- no family    positives in bold   Constitutional:  fever, chills, weakness, weight change, fatigue  Skin:  rash, pruritus, hair loss, bruising, dry skin, petechiae  Head, Face, Neck   headaches, swelling,  cervical adenopathy  Respiratory: shortness of breath, cough, or wheezing  Cardiovascular: chest pain, palpitations, dizzy, edema  Gastrointestinal: nausea, vomiting, diarrhea, constipation,belly pain    Yellow skin, blood in stool  Musculoskeletal:  back pain, muscle weakness, gait problems,       joint pain or swelling. Genitourinary:  dysuria, poor urine flow, flank pain, blood in urine  Neurologic:  vertigo, TIA'S, syncope, seizures, focal weakness  Psychosocial:  insomnia, anxiety, or depression. Additional positive findings:                   All other remaining systems are negative or unable to obtain        PMH/PSH/SH/Family History:     Past Medical History:   Diagnosis Date    Arthritis     Back pain     CAD (coronary artery disease)     Cancer (HCC)     prostate    [facility] mellitus ([facility] [address]     Hyperlipidemia     Hypertension     Rosacea        Past Surgical History:   Procedure Laterality Date    BACK SURGERY      CATARACT REMOVAL WITH IMPLANT      COLONOSCOPY  9/21/11    COLONOSCOPY  2/11/2015    polyp, diverticulosis    CORONARY ANGIOPLASTY WITH STENT PLACEMENT      CORONARY ARTERY BYPASS GRAFT      EYE SURGERY      JOINT REPLACEMENT      knee    PROSTATE SURGERY      seed implants    SHOULDER SURGERY          reports that he has never smoked. He has never used smokeless tobacco. He reports that he does not drink alcohol or use drugs. family history includes Cancer in his mother and sister.          Medication:     Current Facility-Administered Medications: piperacillin-tazobactam (ZOSYN) 3.375 g in dextrose 5 % 100 mL IVPB extended infusion (mini-bag), 3.375 g, Intravenous, Q8H  sodium chloride flush 0.9 % injection 10 mL, 10 mL, Intravenous, 2 times per day  sodium chloride flush 0.9 % injection 10 mL, 10 mL, Intravenous, PRN  acetaminophen (TYLENOL) tablet 650 mg, 650 mg, Oral, Q6H PRN **OR** acetaminophen (TYLENOL) suppository 650 mg, 650 mg, Rectal, Q6H PRN  polyethylene glycol (GLYCOLAX) packet 17 g, 17 g, Oral, Daily PRN  promethazine (PHENERGAN) tablet 12.5 mg, 12.5 mg, Oral, Q6H PRN **OR** ondansetron (ZOFRAN) injection 4 mg, 4 mg, Intravenous, Q6H PRN  hydrocortisone (CORTEF) tablet 15 mg, 15 mg, Oral, Daily  hydrocortisone (CORTEF) tablet 7.5 mg, 7.5 no,cyanosis- no , digital ischemia- no                           muscle strength- grossly normal , tone - grossly normal  Skin: rashes- no , ulcers- no, induration- no, tightening - no  Psychiatric:  Judgement and insight- normal           AAO X 3  Additional finding:      LABS:     Recent Labs     10/17/20  1313   WBC 7.4   HGB 11.0*   HCT 33.5*   *     Recent Labs     10/17/20  1313 10/17/20  2050 10/18/20  0310   * 129* 127*   K 4.2  --  4.2   CL 93*  --  96*   CO2 26  --  24   BUN 17  --  15   CREATININE 0.8  --  0.9   GLUCOSE 220*  --  205*   PHOS  --   --  2.8

## 2020-10-19 LAB
ALBUMIN SERPL-MCNC: 3.4 G/DL (ref 3.4–5)
ANION GAP SERPL CALCULATED.3IONS-SCNC: 9 MMOL/L (ref 3–16)
BASOPHILS ABSOLUTE: 0 K/UL (ref 0–0.2)
BASOPHILS RELATIVE PERCENT: 0.6 %
BUN BLDV-MCNC: 15 MG/DL (ref 7–20)
CALCIUM SERPL-MCNC: 8.8 MG/DL (ref 8.3–10.6)
CHLORIDE BLD-SCNC: 100 MMOL/L (ref 99–110)
CO2: 24 MMOL/L (ref 21–32)
CREAT SERPL-MCNC: 0.8 MG/DL (ref 0.8–1.3)
EKG ATRIAL RATE: 119 BPM
EKG DIAGNOSIS: NORMAL
EKG Q-T INTERVAL: 358 MS
EKG QRS DURATION: 104 MS
EKG QTC CALCULATION (BAZETT): 452 MS
EKG R AXIS: 118 DEGREES
EKG T AXIS: -3 DEGREES
EKG VENTRICULAR RATE: 96 BPM
EOSINOPHILS ABSOLUTE: 0.2 K/UL (ref 0–0.6)
EOSINOPHILS RELATIVE PERCENT: 4.2 %
GFR AFRICAN AMERICAN: >60
GFR NON-AFRICAN AMERICAN: >60
GLUCOSE BLD-MCNC: 116 MG/DL (ref 70–99)
GLUCOSE BLD-MCNC: 181 MG/DL (ref 70–99)
GLUCOSE BLD-MCNC: 199 MG/DL (ref 70–99)
GLUCOSE BLD-MCNC: 73 MG/DL (ref 70–99)
GLUCOSE BLD-MCNC: 82 MG/DL (ref 70–99)
HCT VFR BLD CALC: 31.7 % (ref 40.5–52.5)
HEMOGLOBIN: 10.5 G/DL (ref 13.5–17.5)
LYMPHOCYTES ABSOLUTE: 1 K/UL (ref 1–5.1)
LYMPHOCYTES RELATIVE PERCENT: 16.5 %
MCH RBC QN AUTO: 31.1 PG (ref 26–34)
MCHC RBC AUTO-ENTMCNC: 33.1 G/DL (ref 31–36)
MCV RBC AUTO: 93.7 FL (ref 80–100)
MONOCYTES ABSOLUTE: 0.5 K/UL (ref 0–1.3)
MONOCYTES RELATIVE PERCENT: 8.8 %
NEUTROPHILS ABSOLUTE: 4.1 K/UL (ref 1.7–7.7)
NEUTROPHILS RELATIVE PERCENT: 69.9 %
PDW BLD-RTO: 17 % (ref 12.4–15.4)
PERFORMED ON: ABNORMAL
PERFORMED ON: NORMAL
PHOSPHORUS: 2.9 MG/DL (ref 2.5–4.9)
PLATELET # BLD: 116 K/UL (ref 135–450)
PMV BLD AUTO: 7.2 FL (ref 5–10.5)
POTASSIUM SERPL-SCNC: 4.5 MMOL/L (ref 3.5–5.1)
RBC # BLD: 3.39 M/UL (ref 4.2–5.9)
SODIUM BLD-SCNC: 128 MMOL/L (ref 136–145)
SODIUM BLD-SCNC: 129 MMOL/L (ref 136–145)
SODIUM BLD-SCNC: 132 MMOL/L (ref 136–145)
SODIUM BLD-SCNC: 133 MMOL/L (ref 136–145)
WBC # BLD: 5.9 K/UL (ref 4–11)

## 2020-10-19 PROCEDURE — 2580000003 HC RX 258: Performed by: INTERNAL MEDICINE

## 2020-10-19 PROCEDURE — 80069 RENAL FUNCTION PANEL: CPT

## 2020-10-19 PROCEDURE — 97162 PT EVAL MOD COMPLEX 30 MIN: CPT

## 2020-10-19 PROCEDURE — 6370000000 HC RX 637 (ALT 250 FOR IP): Performed by: INTERNAL MEDICINE

## 2020-10-19 PROCEDURE — 97535 SELF CARE MNGMENT TRAINING: CPT

## 2020-10-19 PROCEDURE — 6360000002 HC RX W HCPCS: Performed by: INTERNAL MEDICINE

## 2020-10-19 PROCEDURE — 6370000000 HC RX 637 (ALT 250 FOR IP): Performed by: NURSE PRACTITIONER

## 2020-10-19 PROCEDURE — 95819 EEG AWAKE AND ASLEEP: CPT

## 2020-10-19 PROCEDURE — 85025 COMPLETE CBC W/AUTO DIFF WBC: CPT

## 2020-10-19 PROCEDURE — 97530 THERAPEUTIC ACTIVITIES: CPT

## 2020-10-19 PROCEDURE — 97166 OT EVAL MOD COMPLEX 45 MIN: CPT

## 2020-10-19 PROCEDURE — 84295 ASSAY OF SERUM SODIUM: CPT

## 2020-10-19 PROCEDURE — 2060000000 HC ICU INTERMEDIATE R&B

## 2020-10-19 RX ORDER — SODIUM CHLORIDE 1000 MG
1 TABLET, SOLUBLE MISCELLANEOUS
Status: DISCONTINUED | OUTPATIENT
Start: 2020-10-19 | End: 2020-10-20

## 2020-10-19 RX ADMIN — LAMOTRIGINE 25 MG: 25 TABLET ORAL at 21:25

## 2020-10-19 RX ADMIN — CARBIDOPA AND LEVODOPA 1 TABLET: 25; 100 TABLET ORAL at 08:22

## 2020-10-19 RX ADMIN — HYDROCORTISONE 7.5 MG: 5 TABLET ORAL at 17:16

## 2020-10-19 RX ADMIN — PIPERACILLIN AND TAZOBACTAM 3.38 G: 3; .375 INJECTION, POWDER, LYOPHILIZED, FOR SOLUTION INTRAVENOUS at 21:26

## 2020-10-19 RX ADMIN — ATORVASTATIN CALCIUM 20 MG: 20 TABLET, FILM COATED ORAL at 21:26

## 2020-10-19 RX ADMIN — Medication 10 ML: at 08:24

## 2020-10-19 RX ADMIN — DIGOXIN 125 MCG: 125 TABLET ORAL at 08:23

## 2020-10-19 RX ADMIN — CARBIDOPA AND LEVODOPA 1 TABLET: 25; 100 TABLET ORAL at 17:16

## 2020-10-19 RX ADMIN — LAMOTRIGINE 25 MG: 25 TABLET ORAL at 08:24

## 2020-10-19 RX ADMIN — INSULIN LISPRO 1 UNITS: 100 INJECTION, SOLUTION INTRAVENOUS; SUBCUTANEOUS at 21:27

## 2020-10-19 RX ADMIN — Medication 10 ML: at 21:29

## 2020-10-19 RX ADMIN — CARBIDOPA AND LEVODOPA 1 TABLET: 25; 100 TABLET ORAL at 21:26

## 2020-10-19 RX ADMIN — APIXABAN 5 MG: 5 TABLET, FILM COATED ORAL at 08:23

## 2020-10-19 RX ADMIN — POLYETHYLENE GLYCOL 3350 17 G: 17 POWDER, FOR SOLUTION ORAL at 08:24

## 2020-10-19 RX ADMIN — DULOXETINE HYDROCHLORIDE 60 MG: 60 CAPSULE, DELAYED RELEASE ORAL at 21:25

## 2020-10-19 RX ADMIN — APIXABAN 5 MG: 5 TABLET, FILM COATED ORAL at 21:25

## 2020-10-19 RX ADMIN — METOPROLOL TARTRATE 25 MG: 25 TABLET, FILM COATED ORAL at 21:26

## 2020-10-19 RX ADMIN — METOPROLOL TARTRATE 25 MG: 25 TABLET, FILM COATED ORAL at 12:14

## 2020-10-19 RX ADMIN — DOCUSATE SODIUM 100 MG: 100 CAPSULE, LIQUID FILLED ORAL at 08:23

## 2020-10-19 RX ADMIN — Medication 1 G: at 21:25

## 2020-10-19 RX ADMIN — GUAIFENESIN 600 MG: 600 TABLET, EXTENDED RELEASE ORAL at 08:22

## 2020-10-19 RX ADMIN — HYDROCORTISONE 15 MG: 5 TABLET ORAL at 08:25

## 2020-10-19 RX ADMIN — PIPERACILLIN AND TAZOBACTAM 3.38 G: 3; .375 INJECTION, POWDER, LYOPHILIZED, FOR SOLUTION INTRAVENOUS at 12:17

## 2020-10-19 RX ADMIN — MUPIROCIN: 20 OINTMENT TOPICAL at 12:14

## 2020-10-19 RX ADMIN — DOCUSATE SODIUM 100 MG: 100 CAPSULE, LIQUID FILLED ORAL at 21:26

## 2020-10-19 RX ADMIN — PIPERACILLIN AND TAZOBACTAM 3.38 G: 3; .375 INJECTION, POWDER, LYOPHILIZED, FOR SOLUTION INTRAVENOUS at 03:51

## 2020-10-19 RX ADMIN — GUAIFENESIN 600 MG: 600 TABLET, EXTENDED RELEASE ORAL at 21:26

## 2020-10-19 RX ADMIN — CYANOCOBALAMIN TAB 1000 MCG 1000 MCG: 1000 TAB at 08:23

## 2020-10-19 RX ADMIN — LORAZEPAM 0.5 MG: 0.5 TABLET ORAL at 21:25

## 2020-10-19 RX ADMIN — INSULIN GLARGINE 18 UNITS: 100 INJECTION, SOLUTION SUBCUTANEOUS at 08:42

## 2020-10-19 RX ADMIN — TAMSULOSIN HYDROCHLORIDE 0.4 MG: 0.4 CAPSULE ORAL at 21:26

## 2020-10-19 ASSESSMENT — PAIN SCALES - GENERAL
PAINLEVEL_OUTOF10: 0

## 2020-10-19 ASSESSMENT — ENCOUNTER SYMPTOMS
COUGH: 0
NAUSEA: 0
VOMITING: 0
SHORTNESS OF BREATH: 0

## 2020-10-19 NOTE — PROGRESS NOTES
Spoke with MD Harris Parmar with nephrology on phone. Verbal orders placed for FR of 1500ml/day and sodium chloride tab 1gram TID.

## 2020-10-19 NOTE — PROGRESS NOTES
Neurology Progress Note  Seen for spell     Updates:  Patient tells me that he feels \" much better today\"  No interval clinical events concerning for seizures    ROS:   Review of Systems   Respiratory: Negative for cough and shortness of breath. Gastrointestinal: Negative for nausea and vomiting. Neurological: Negative for speech difficulty and headaches. Exam:  Blood pressure (!) 145/91, pulse 84, temperature 97.9 °F (36.6 °C), temperature source Oral, resp. rate 18, height 6' 1\" (1.854 m), weight 216 lb 4.3 oz (98.1 kg), SpO2 99 %. Constitutional    Vital signs: BP, HR, and RR reviewed   General Alert, no distress, well-nourished  Eyes: fundoscopic exam not attempted   Cardiovascular: pulses symmetric in all 4 extremities. No peripheral edema. Psychiatric: cooperative with examination, no  psychotic behavior noted. Neurologic  Mental status: Eyes open spontaneously   orientation to person, place, month, year   General fund of knowledge grossly intact   Memory grossly intact   Attention intact as able to attend well to the exam     Language fluent in conversation   Comprehension intact; follows simple commands  Cranial nerves:   CN2: Visual Fields full w/o extinction on confrontational testing  CN 3,4,6: extraocular muscles intact  CN5: facial sensation symmetric   CN7:face symmetric without dysarthria  CN8: hearing grossly intact  CN9: palate elevated symmetrically  CN11: trap full strength on shoulder shrug  CN12: tongue midline with protrusion  Strength:  Good strength in all 4 extremities   Sensory: light touch intact in all 4 extremities. Tone: Slightly increased BUEs, normal BLEs  Gait: Deferred for safety  Other: Very slight right hand resting tremor       Labs:  Na: 132  BUN: 15  Creatinine: 0.98  Glucose: 116  ALT: 7  AST: 23  WBC: 5.9    UA:   Ref.  Range 10/17/2020 13:13   Nitrite, Urine Latest Ref Range: Negative  Negative   Leukocyte Esterase, Urine Latest Ref Range: Negative  MODERATE (A)       Urine culture: Pending    Studies:  CT Head w/o 10/17/20:    1.  No acute intracranial process. 2.  Moderate cerebral atrophy and mild chronic small vessel ischemic disease. Impression:  1. Spell of abnormal behavior  2. UTI  3. Hyponatremia  4. Parkinson's disease  5. Adrenal insufficiency    Hernan Singh is a 80 y.o. male with a history of Parkinson's who presented with an episode of starring and altered level of awareness most concerning for seizure. Improved upon arrival to the hospital and now back to baseline. He has had suspected complex partial seizures in the past and it was recommended that he start taking an AED, however, unclear if he has ever been on AED. Was not on AED just prior to admission. UA suggestive of UTI which likely lowered his seizure threshold. Recommendations:  - Await routine EEG  - Continue Lamotrigine 25 mg BID. Will need further titration of Lamotrigine dose as an outpatient   - Continue home Sinemet   - PT/OT, rehab as able  - Seizure precautions  - Given episode of altered level of awareness the patient should avoid driving for at least 3 months, episode free with clearance from a physician, as well as refraining from activities that could be of danger to himself or others should he have another event such as swimming, bathing, operating heavy machinery, climbing heights or watching young children who do not have the ability to call for help if needed. Please include in discharge instructions  - Treatment of UTI underway per primary   - Follow up with [address] [place] [address] [address] Neurology, Dr. Rosalie Jaimes in 2 weeks after discharge   - Will sign off. No further recommendations.  Please call with questions         A copy of this note was provided for [facility]  [phone]

## 2020-10-19 NOTE — PROGRESS NOTES
Occupational Therapy   Occupational Therapy Initial Assessment, Treatment and D/c  Note   Date: 10/19/2020   Patient Name: Vickey High  MRN: [id number]     : 1933    Date of Service: 10/19/2020    Discharge Recommendations:Shantanu Costa scored a 17/24 on the AM-PAC ADL Inpatient form. At this time, no further OT is recommended upon discharge . Recommend patient returns to prior setting with prior services. OT Equipment Recommendations  Equipment Needed: No    Assessment   Assessment: Pt from Assisted Living and able to clearly state safety at home - calling for assist / using scooter vs walker etc.  Pt has assist with LE dressing / bathing and transfers as needed using call light at AL. Pt appears functioning at baseline level. Pt has all DME needs. No ongoing OT indicated. Will sign off from OT services. Pt plans to return to AL at d/c. Decision Making: Medium Complexity  OT Education: OT Role;Plan of Care;ADL Adaptive Strategies  Patient Education: very understanding  No Skilled OT: At baseline function; No OT goals identified  REQUIRES OT FOLLOW UP: No  Activity Tolerance  Activity Tolerance: Patient Tolerated treatment well  Activity Tolerance: Pt alert/ cooperative and no ORTEGA noted with activity  Safety Devices  Safety Devices in place: Yes  Type of devices: Nurse notified;Call light within reach; Chair alarm in place; Left in chair           Patient Diagnosis(es): The primary encounter diagnosis was Acute cystitis without hematuria. A diagnosis of Adrenal insufficiency (HCC) was also pertinent to this visit. has a past medical history of Arthritis, Back pain, CAD (coronary artery disease), Cancer ([facility] Diabetes mellitus ([facility] Hyperlipidemia, Hypertension, and Rosacea. has a past surgical history that includes Coronary angioplasty with stent; back surgery; joint replacement; shoulder surgery; eye surgery;  Cataract removal with implant; Coronary artery bypass graft; Prostate surgery; Colonoscopy (9/21/11); and Colonoscopy (2/11/2015). Restrictions  Position Activity Restriction  Other position/activity restrictions: Up as tolerated    Subjective   General  Chart Reviewed: Yes  Additional Pertinent Hx: Admit 10/17 syncopal event / UTI    CT head - Moderate cerebral atrophy and mild chronic small vessel ischemic disease, cxray-Mild pulmonary edema pattern. Recently d/c from [facility] 10/                                 PMHX:HTN,HLD,CAD,OA,DM, CABG, Prostate sx  Family / Caregiver Present: No  Diagnosis: Syncopal event / UTI  Subjective  Subjective: \" I feel pretty good ' pt found in bed - agreeable for OOB/ OT eval and tx. Pt reports he lives in assisted living  Patient Currently in Pain: Denies    Social/Functional History  Social/Functional History  Lives With: Alone(wife lives in independent living)  Type of Home: Assisted living([facility])  Home Layout: One level  Home Access: Elevator  Bathroom Toilet: Handicap height  Bathroom Equipment: Grab bars in shower, Grab bars around toilet, Built-in shower seat, Hand-held shower  Home Equipment: Rolling walker, Electric scooter, Lift chair, Alert Button  ADL Assistance: Needs assistance  Homemaking Assistance: Needs assistance  Ambulation Assistance: Needs assistance(mostly uses scooter, occasionally uses walker for short distances)  Transfer Assistance: Needs assistance(staff assists with stand pivot transfer to/from scooter)  Active : No  Occupation: Retired  Additional Comments: Pt with recent hospital stay at [facility] for hypotension and adrenal insufficiency and d/c'd on 10/16 to NH. Wife did not feel pt was ready for d/c. Wife lives on 2nd floor in independent living and sees pt daily. Wife/staff assists pt with ADLs. Objective  Treatment included functional transfer training, ADL's and pt. education.     Vision: Within Functional Limits  Hearing: Exceptions to [facility]  Hearing Exceptions: Hard of hearing/hearing concerns    Orientation  Overall Orientation Status: Within Functional Limits     Balance  Sitting Balance: Independent  Standing Balance: Contact guard assistance(with walker)  Standing Balance  Time: 1 mins x 1 and 30 sec x 2  Activity: functional stance / transfers  Functional Mobility  Functional - Mobility Device: Rolling Walker  Activity: Other(3-4 ft)  Assist Level: Minimal assistance  Functional Mobility Comments: Pt reports doesn't walk much 2/2 RLE / knee gives out  Toilet Transfers  Toilet - Technique: Squat pivot;Stand pivot  Equipment Used: Standard bedside commode  Toilet Transfer: Contact guard assistance  Toilet Transfers Comments: Pt slow and cautious -- pt able to state where to place chair for safest transfers -- performs at AL with /without help \" I catch hell if I don't call first.\"  ADL  Feeding: Setup  Grooming: Setup  LE Dressing: Moderate assistance;Minimal assistance(CGA with RLE and Min/mod A with LLE -- pt reports has assist with LE dressing at AL)  Toileting: Unable to assess(comment)(pt reports RNstaff at AL help prn when needed / otherwise he performs)  Tone RUE  RUE Tone: Normotonic  Tone LUE  LUE Tone: Normotonic  Coordination  Movements Are Fluid And Coordinated: Yes        Transfers  Stand Pivot Transfers: Contact guard assistance  Sit to stand: Minimal assistance  Stand to sit: Contact guard assistance  Transfer Comments: v.cues / raised bed  Vision - Basic Assessment  Prior Vision: No visual deficits  Cognition  Overall Cognitive Status: Exceptions  Arousal/Alertness: Appropriate responses to stimuli  Following Commands:  Follows one step commands consistently  Safety Judgement: Decreased awareness of need for assistance  Insights: Decreased awareness of deficits  Cognition Comment: Pt able to state to call for help and proper tech but doesn't always follow that advice at AL apt    LUE AROM (degrees)  LUE AROM : WFL  Left Hand AROM (degrees)  Left Hand AROM: WFL  RUE AROM (degrees)  RUE AROM : WFL  Right Hand AROM (degrees)  Right Hand AROM: WFL        Hand Dominance  Hand Dominance: Right     Plan D/c Acute OT services             AM-PAC Score  AM-PAC Inpatient Daily Activity Raw Score: 17 (10/19/20 1221)  AM-PAC Inpatient ADL T-Scale Score : 37.26 (10/19/20 1221)  ADL Inpatient CMS 0-100% Score: 50.11 (10/19/20 1221)  ADL Inpatient CMS G-Code Modifier : CK (10/19/20 1221)       Therapy Time   Individual Concurrent Group Co-treatment   Time In 1129         Time Out 1208         Minutes 39            Timed Code Treatment Minutes:   24 mins     Total Treatment Minutes:  44 mins       Pramod Valentino, OT

## 2020-10-19 NOTE — PLAN OF CARE
Problem: Falls - Risk of:  Goal: Will remain free from falls  Description: Pt with absence of falls this shift. Pt has nonskid socks in place and bed alarm is on. Pt demonstrates safety awareness and calls to RN appropriately. Will continue to monitor. Outcome: Ongoing  Currently off floor, but call light and bed alarm on when in bed and with in reach. Encourage pt to use call light. Will continue to monitor safety. Problem: Skin Integrity:  Goal: Will show no infection signs and symptoms  Description: Will show no infection signs and symptoms  Outcome: Ongoing   Decrease Bhavik scale. Turning pt every two hours to help with skin breakdown. Scattered abrasions, no breakdown noted. Skin barrier cream applied. Heels elevated off bed. Will continue to monitor skin. Problem: Safety:  Goal: Ability to remain free from injury will improve  Description: No evidence of seizure activity this shift. Pt A&O x4. Neuro WNL. Will continue to monitor. Outcome: Ongoing   Continue to monitor safety, getting EEG today, still off floor. Will call wife and update. Still getting IV antibiotics.

## 2020-10-19 NOTE — PROGRESS NOTES
tenderness, deformity or swelling      DERM: normal coloration and turgor, no rashesor bruising    Vitals:     BP (!) 145/91   Pulse 84   Temp 97.9 °F (36.6 °C) (Oral)   Resp 18   Ht 6' 1\" (1.854 m)   Wt 216 lb 4.3 oz (98.1 kg)   SpO2 99%   BMI 28.53 kg/m²      I/Os: Reviewed    Meds: Reviewed. Please see plan for changes made.     Labs:    Lab Results   Component Value Date    WBC 5.9 10/19/2020    HGB 10.5 10/19/2020    HCT 31.7 10/19/2020    MCV 93.7 10/19/2020     10/19/2020      Lab Results   Component Value Date    CREATININE 0.8 10/19/2020    BUN 15 10/19/2020     10/19/2020    K 4.5 10/19/2020    K 4.2 10/17/2020     10/19/2020    CO2 24 10/19/2020     No components found for: GLU   Lab Results   Component Value Date    CALCIUM 8.8 10/19/2020    PHOS 2.9 10/19/2020      No results found for: [id number]   No results found for: IRON, TIBC, FERRITIN   No results found for: [place]

## 2020-10-19 NOTE — CARE COORDINATION
Case Management Assessment           Initial Evaluation                Date / Time of Evaluation: 10/19/2020 4:31 PM                 Assessment Completed by: Brandon Briseno    Patient Name: Macrina Rivera     YOB: 1933  Diagnosis: UTI (urinary tract infection) [N39.0]  UTI (urinary tract infection) [N39.0]     Date / Time: 10/17/2020 12:12 PM    Patient Admission Status: Inpatient    If patient is discharged prior to next notation, then this note serves as note for discharge by case management. Current PCP: Kade Birch Patient: No    Chart Reviewed: Yes  Patient/ Family Interviewed: Yes    Initial assessment completed at bedside with: wife via phone    Hospitalization in the last 30 days: Yes    Emergency Contacts:  Extended Emergency Contact Information  Primary Emergency Contact: Karen Workman  Address: [address] [place] [address] Phone: [phone]  Mobile Phone: [phone]  Relation: Spouse  Secondary Emergency Contact: [address] Phone: [phone]  Relation: Child    Advance Directives:   Code Status: [address]: Yes  Agent: Ole Drop  Contact Number: [phone]    Copy present: No     In paper Chart: No    Scanned into EMR No    Financial  Payor: MEDICARE / Plan: MEDICARE PART A AND B / Product Type: *No Product type* /     Pre-cert required for SNF: No    Pharmacy    Harshad Zheng [address] [place], [place] [id number] Orlean Figures [phone]  [address] [place] [address] Drive [address]  Phone: [phone] Fax: [phone]    [facility]/pharmacy #[id number]- [address] [phone] - F [phone]  Ellen Owens [address]   Fani Parra [address]  Phone: [phone] Fax: [phone]      Potential assistance Purchasing Medications:    Does Patient want to participate in local refill/ meds to beds program?: Phone: [phone] Fax: n/a    Transportation PLAN for discharge: family     Factors facilitating achievement of predicted outcomes: Family support, Caregiver support, Cooperative and Pleasant    Barriers to discharge: Medication managment    Additional Case Management Notes: CM spoke with wife via phone, she reports that her and the patient live in [place] at [address] [place] [address], they have aides and a nurse on around the clock. Patient has recently been at [facility] and was set up with PT/OT and RN with [facility] from [facility]. Patients wife has also hired a private duty aide for overnight hours from 8p-8a, to assist with patient overnight due to altered mental status and for safety to help prevent falls as he has been falling more frequently. Wife plans to take patient back to AL at [facility] with her when he is ready to DC. She is aware of tentative DC in next 24-48 hours. Wife reports having all DME needs met already, CM will need to fax updated [address] orders to [facility] [place] for resumption of care. The Plan for Transition of Care is related to the following treatment goals of UTI (urinary tract infection) [N39.0]  UTI (urinary tract infection) [N39.0]    The Patient and/or patient representative Hoang Elizabeth and his family were provided with a choice of provider and agrees with the discharge plan Yes    Freedom of choice list was provided with basic dialogue that supports the patient's individualized plan of care/goals and shares the quality data associated with the providers.  Yes    Care Transition patient: No    Ariadne Arreola RN  [facility]  Case Management Department  Ph: [phone]   Fax: [phone]

## 2020-10-19 NOTE — PROGRESS NOTES
Physical Therapy    Facility/Department: [place] [address] [facility]  Initial Assessment and DISCHARGE    NAME: Myrna Mcdaniel  : 1933  MRN: [id number]    Date of Service: 10/19/2020    Discharge Recommendations:  Myrna Mcdaniel scored a 15/24 on the AM-PAC short mobility form. At this time, no further PT is recommended upon discharge due to pt at/near functional baseline. Recommend patient returns to prior setting with prior services. PT Equipment Recommendations  Equipment Needed: No    Assessment   Assessment: Pt from assisted living with AMS and UTI. Pt uses scooter at baseline for mobility and has assist for transfers from staff. Feel pt is at/near functional baseline for transfers this date. Pt in agreement and voices no concerns returning home with prior level of help. Pt verbalizes good safety awareness and insight for his limitations. Recommend ANGIE kim with RN staff. Will sign off as no further PT needs were identified. Decision Making: Medium Complexity  Patient Education: Role of PT. Pt vebralized understanding. REQUIRES PT FOLLOW UP: No         Restrictions  Up as tolerated     Vision/Hearing  Vision: Within Functional Limits  Hearing Exceptions: Hard of hearing/hearing concerns       Subjective  Chart Reviewed: Yes  Additional Pertinent Hx: Pt to ED 10/17 with AMS and admitted for UTI. Head CT (-). CXR: Mild pulmonary edema pattern. PMH:  HTN, DM, CA, OA, CAD  Diagnosis: UTI    Subjective  Subjective: Pt found supine in bed. Pt pleasant and agreeable for PT. \"well I'm always supposed to call for help. \"  Pain Screening  Patient Currently in Pain: Denies    Orientation  Impaired  Orientation Level: Oriented to person;Oriented to time;Disoriented to situation;Oriented to place    Social/Functional History  Lives With: Alone(wife lives in independent living)  Type of Home: Assisted living([facility])  Home Layout: One level  Home Access: Elevator  Bathroom Toilet: Handicap height  Bathroom Equipment: Grab bars in shower, Grab bars around toilet, Built-in shower seat, Hand-held shower  Home Equipment: Rolling walker, Electric scooter, Lift chair, Alert Button  ADL Assistance: Needs assistance  Homemaking Assistance: Needs assistance  Ambulation Assistance: Needs assistance(mostly uses scooter, occasionally uses walker for short distances)  Transfer Assistance: Needs assistance(staff assists with stand pivot transfer to/from scooter)  Active : No  Occupation: Retired  Additional Comments: Pt with recent hospital stay at [facility] for hypotension and adrenal insufficiency and d/c'd on 10/16 to NH. Wife did not feel pt was ready for d/c. Wife lives on 2nd floor in independent living and sees pt daily. Wife/staff assists pt with ADLs.       Objective  Strength  Strength RLE: WFL(at least 3/5 throughout LE)  Strength LLE: WFL(at least 3/5 throughout LE)    Bed mobility  Supine to Sit: Minimal assistance(HOB raised)     Transfers  Sit to Stand: Minimal Assistance(from raised EOB to walker, increased effort to transition hands; right knee blocked)  Stand to sit: Minimal Assistance(decreased control to sit)  Stand Pivot Transfers: Contact guard assistance(bed <> chair to simulate scooter transfer)     Ambulation  Device: Rolling Walker  Assistance: Minimal assistance  Quality of Gait: Flexed posture  Gait Deviations: Decreased step length;Decreased step height  Distance: 3-4 steps with walker to chair    Balance  Sitting - Static: Good  Sitting - Dynamic: Good  Standing - Static: Fair  Standing - Dynamic: Poor(using rolling walker)    Treatment included gait and transfer training with patient education     Plan: Discharge acute PT      Safety Devices  Type of devices: Left in chair, Chair alarm in place, Call light within reach, Nurse notified(RN in room)      AM-PAC Score  AM-PAC Inpatient Mobility Raw Score : 15 (10/19/20 1209)  AM-PAC Inpatient T-Scale Score : 39.45 (10/19/20 1209)  Mobility Inpatient CMS 0-100% Score: 57.7 (10/19/20 1209)  Mobility Inpatient CMS G-Code Modifier : CK (10/19/20 1209)      Therapy Time   Individual Concurrent Group Co-treatment   Time In 1129         Time Out 1208         Minutes 39         Timed Code Treatment Minutes:  25  Total Treatment Minutes:  1900 Sergio Ma, PT

## 2020-10-19 NOTE — PROGRESS NOTES
On call nephrology notified of last sodium result of 128. Will continue to monitor and await further orders.

## 2020-10-19 NOTE — PROGRESS NOTES
Updated wife Gloria Balbuena over the phone about patients care plan. Wife understands plan of care and would like patient moved off floor to visit when bed is available. Charge nurse aware and will work on getting him moved possible tomorrow when beds are available.

## 2020-10-19 NOTE — PLAN OF CARE
Problem: Falls - Risk of:  Goal: Will remain free from falls  10/19/2020 0003 by Anthony Singletary RN  Outcome: Ongoing  Note: Pt with absence of falls this shift. Pt has nonskid socks in place and bed alarm is on. Pt demonstrates safety awareness and calls to RN appropriately. Will continue to monitor. Problem: Safety:  Goal: Ability to remain free from injury will improve  Outcome: Ongoing  Note: No evidence of seizure activity this shift. Pt A&O x4. Neuro WNL. Will continue to monitor.

## 2020-10-19 NOTE — PROGRESS NOTES
Hospitalist Progress Note      PCP: Francie Mendoza    Date of Admission: 10/17/2020    Chief Complaint on Admission: staring spell, falls    Pt Seen/Examined and Chart Reviewed. Admitting dx as above, UTI    SUBJECTIVE/OBJECTIVE:   The patient is a 80 y.o. male with complex medical history including Parkinson's disease, a fib, CAD, HTN, recently diagnosed adrenal insufficiency, who presented to [facility] from his assisted living facility with several concerns- frequent urination, falls, staring spell. Also could not get steroid filled as outpatient, missed one day of solucortef. Patient sitting up in the chair today patient reports feeling much better overall. He has no symptoms when he is laying in bed. Urinating better. No dyspnea.    -Blood pressure was slightly low in the morning better now  EEG pending    Allergies  Gabapentin and Morphine    Medications      Scheduled Meds:   insulin glargine  18 Units Subcutaneous Daily    insulin lispro  0-12 Units Subcutaneous TID WC    insulin lispro  0-6 Units Subcutaneous Nightly    lamoTRIgine  25 mg Oral BID    piperacillin-tazobactam  3.375 g Intravenous Q8H    sodium chloride flush  10 mL Intravenous 2 times per day    hydrocortisone  15 mg Oral Daily    hydrocortisone  7.5 mg Oral QPM    influenza virus vaccine  0.5 mL Intramuscular Once    apixaban  5 mg Oral BID    atorvastatin  20 mg Oral Nightly    carbidopa-levodopa  1 tablet Oral TID    cyanocobalamin  1,000 mcg Oral Daily    digoxin  125 mcg Oral Daily    docusate sodium  100 mg Oral BID    DULoxetine  60 mg Oral QPM    guaiFENesin  600 mg Oral BID    linaclotide  145 mcg Oral QAM AC    metoprolol tartrate  25 mg Oral BID    mupirocin   Topical Daily    polyethylene glycol  17 g Oral Daily    tamsulosin  0.4 mg Oral Nightly    traZODone  200 mg Oral Nightly       Infusions:   dextrose         PRN Meds:  glucose, dextrose, glucagon (rDNA), dextrose, sodium chloride flush, acetaminophen **OR** acetaminophen, polyethylene glycol, promethazine **OR** ondansetron, LORazepam, oxyCODONE    Vitals    TEMPERATURE:  Current - Temp: 98.1 °F (36.7 °C); Max - Temp  Av.1 °F (36.7 °C)  Min: 98 °F (36.7 °C)  Max: 98.3 °F (36.8 °C)  RESPIRATIONS RANGE: Resp  Av.2  Min: 18  Max: 19  PULSE RANGE: Pulse  Av  Min: 66  Max: 88  BLOOD PRESSURE RANGE:  Systolic (70CJX), VYB:889 , Min:98 , MMW:893   ; Diastolic (10HNO), VHY:94, Min:61, Max:79    PULSE OXIMETRY RANGE: SpO2  Av.3 %  Min: 94 %  Max: 97 %  24HR INTAKE/OUTPUT:      Intake/Output Summary (Last 24 hours) at 10/19/2020 1141  Last data filed at 10/19/2020 7013  Gross per 24 hour   Intake 2602 ml   Output 1390 ml   Net 1212 ml       Exam:      General appearance: No apparent distress, appears stated age and cooperative. Lungs: Clear to ascultation, bilaterally without Rales/Wheezes/Rhonchi with good respiratory effort. Heart: Regular rate and rhythm with Normal S1/S2 without  murmurs, rubs or gallops, point of maximum impulse non-displaced  Abdomen: Soft, non-tender or non-distended without rigidity or guarding and positive bowel sounds all four quadrants. Extremities: No clubbing, cyanosis, or edema bilaterally. Skin: Skin color, texture, turgor normal.    Neurologic: Alert and oriented X 3,   grossly non-focal  Mental status: Alert, oriented, thought content appropriate. Data    Recent Labs     10/17/20  1313 10/19/20  0341   WBC 7.4 5.9   HGB 11.0* 10.5*   HCT 33.5* 31.7*   * 116*      Recent Labs     10/17/20  1313  10/18/20  0310  10/18/20  2212 10/19/20  0341 10/19/20  0839   *   < > 127*   < > 131* 133* 132*   K 4.2  --  4.2  --   --  4.5  --    CL 93*  --  96*  --   --  100  --    CO2 26  --  24  --   --  24  --    PHOS  --   --  2.8  --   --  2.9  --    BUN 17  --  15  --   --  15  --    CREATININE 0.8  --  0.9  --   --  0.8  --     < > = values in this interval not displayed.      Recent Labs     10/17/20  1313   AST 23   ALT 7*   BILIDIR 0.3   BILITOT 0.8   ALKPHOS 95     No results for input(s): INR in the last 72 hours. Recent Labs     10/17/20  1313   TROPONINI 0.02*       Consults:     IP CONSULT TO HOSPITALIST  IP CONSULT TO NEPHROLOGY  IP CONSULT TO NEUROLOGY  IP CONSULT TO SOCIAL WORK  IP CONSULT TO Antonio Campost[facility]brant [id number] Problems    Diagnosis Date Noted    UTI (urinary tract infection) [N39.0] 10/17/2020         ASSESSMENT AND PLAN      Spell of abnormal consciousness:  Concerning for either near syncope or absence seizure  monitor on tele. spot EEG pending. Head CT non acute. Orthostatics were positive from laying to sitting. Has multiple reasons for orthostasis. 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 Will need meds to bed prior to discharge.      Parkinson's disease:  Resumed home medications, PT/OT     UTI:  UA is abnormal and patient had frequency. Historically had enterococcal UTIs. cont zosyn until current cx      A fib, s/p PPM, CAD:  Eliquis, statin, dig, metoprolol     Hyponatremia:  Per history- hypovolemic hyponatremia, poor solute intake. Consulted nephrology. Urine osm low. DM type 2:  Take 44 U triseba at home.  Will resume lantus 18 and ISS    DVT Prophylaxis: eliquis  Diet: DIET GENERAL;  Code Status: Full Code    PT/OT Eval Status:pending    Dispo - inpt, 1 to 2 days    Jolanta Mitchell MD

## 2020-10-20 VITALS
HEIGHT: 73 IN | HEART RATE: 79 BPM | RESPIRATION RATE: 18 BRPM | SYSTOLIC BLOOD PRESSURE: 123 MMHG | DIASTOLIC BLOOD PRESSURE: 77 MMHG | OXYGEN SATURATION: 92 % | WEIGHT: 217.81 LBS | TEMPERATURE: 98.1 F | BODY MASS INDEX: 28.87 KG/M2

## 2020-10-20 LAB
ALBUMIN SERPL-MCNC: 3.6 G/DL (ref 3.4–5)
ANION GAP SERPL CALCULATED.3IONS-SCNC: 11 MMOL/L (ref 3–16)
BUN BLDV-MCNC: 17 MG/DL (ref 7–20)
CALCIUM SERPL-MCNC: 8.8 MG/DL (ref 8.3–10.6)
CHLORIDE BLD-SCNC: 97 MMOL/L (ref 99–110)
CO2: 23 MMOL/L (ref 21–32)
CREAT SERPL-MCNC: 0.8 MG/DL (ref 0.8–1.3)
GFR AFRICAN AMERICAN: >60
GFR NON-AFRICAN AMERICAN: >60
GLUCOSE BLD-MCNC: 102 MG/DL (ref 70–99)
GLUCOSE BLD-MCNC: 105 MG/DL (ref 70–99)
GLUCOSE BLD-MCNC: 166 MG/DL (ref 70–99)
ORGANISM: ABNORMAL
PERFORMED ON: ABNORMAL
PERFORMED ON: ABNORMAL
PHOSPHORUS: 3.1 MG/DL (ref 2.5–4.9)
POTASSIUM SERPL-SCNC: 4.4 MMOL/L (ref 3.5–5.1)
SODIUM BLD-SCNC: 130 MMOL/L (ref 136–145)
SODIUM BLD-SCNC: 131 MMOL/L (ref 136–145)
SODIUM BLD-SCNC: 131 MMOL/L (ref 136–145)
TROPONIN: 0.02 NG/ML
URINE CULTURE, ROUTINE: ABNORMAL

## 2020-10-20 PROCEDURE — 84484 ASSAY OF TROPONIN QUANT: CPT

## 2020-10-20 PROCEDURE — 6370000000 HC RX 637 (ALT 250 FOR IP): Performed by: INTERNAL MEDICINE

## 2020-10-20 PROCEDURE — 6360000002 HC RX W HCPCS: Performed by: INTERNAL MEDICINE

## 2020-10-20 PROCEDURE — 2580000003 HC RX 258: Performed by: INTERNAL MEDICINE

## 2020-10-20 PROCEDURE — 84295 ASSAY OF SERUM SODIUM: CPT

## 2020-10-20 PROCEDURE — 36415 COLL VENOUS BLD VENIPUNCTURE: CPT

## 2020-10-20 PROCEDURE — 80069 RENAL FUNCTION PANEL: CPT

## 2020-10-20 PROCEDURE — 6370000000 HC RX 637 (ALT 250 FOR IP): Performed by: NURSE PRACTITIONER

## 2020-10-20 RX ORDER — LAMOTRIGINE 25 MG/1
25 TABLET ORAL 2 TIMES DAILY
Qty: 30 TABLET | Refills: 3 | Status: SHIPPED | OUTPATIENT
Start: 2020-10-20

## 2020-10-20 RX ORDER — AMOXICILLIN 125 MG/1
125 TABLET, CHEWABLE ORAL 2 TIMES DAILY
Qty: 6 TABLET | Refills: 0 | Status: SHIPPED | OUTPATIENT
Start: 2020-10-20 | End: 2020-10-20 | Stop reason: HOSPADM

## 2020-10-20 RX ORDER — AMOXICILLIN 500 MG/1
500 CAPSULE ORAL 3 TIMES DAILY
Qty: 9 CAPSULE | Refills: 0 | Status: SHIPPED | OUTPATIENT
Start: 2020-10-20 | End: 2020-10-23

## 2020-10-20 RX ORDER — AMPICILLIN 500 MG/1
500 CAPSULE ORAL 4 TIMES DAILY
Qty: 12 CAPSULE | Refills: 0 | Status: SHIPPED | OUTPATIENT
Start: 2020-10-20 | End: 2020-10-20 | Stop reason: HOSPADM

## 2020-10-20 RX ORDER — ATORVASTATIN CALCIUM 20 MG/1
20 TABLET, FILM COATED ORAL NIGHTLY
Qty: 30 TABLET | Refills: 3 | Status: ON HOLD | OUTPATIENT
Start: 2020-10-20 | End: 2020-11-30 | Stop reason: HOSPADM

## 2020-10-20 RX ADMIN — CARBIDOPA AND LEVODOPA 1 TABLET: 25; 100 TABLET ORAL at 09:56

## 2020-10-20 RX ADMIN — CYANOCOBALAMIN TAB 1000 MCG 1000 MCG: 1000 TAB at 09:56

## 2020-10-20 RX ADMIN — INSULIN GLARGINE 18 UNITS: 100 INJECTION, SOLUTION SUBCUTANEOUS at 09:58

## 2020-10-20 RX ADMIN — LAMOTRIGINE 25 MG: 25 TABLET ORAL at 09:56

## 2020-10-20 RX ADMIN — METOPROLOL TARTRATE 25 MG: 25 TABLET, FILM COATED ORAL at 09:56

## 2020-10-20 RX ADMIN — MUPIROCIN: 20 OINTMENT TOPICAL at 09:58

## 2020-10-20 RX ADMIN — DOCUSATE SODIUM 100 MG: 100 CAPSULE, LIQUID FILLED ORAL at 09:56

## 2020-10-20 RX ADMIN — PIPERACILLIN AND TAZOBACTAM 3.38 G: 3; .375 INJECTION, POWDER, LYOPHILIZED, FOR SOLUTION INTRAVENOUS at 05:43

## 2020-10-20 RX ADMIN — CARBIDOPA AND LEVODOPA 1 TABLET: 25; 100 TABLET ORAL at 13:43

## 2020-10-20 RX ADMIN — HYDROCORTISONE 15 MG: 5 TABLET ORAL at 09:57

## 2020-10-20 RX ADMIN — APIXABAN 5 MG: 5 TABLET, FILM COATED ORAL at 09:56

## 2020-10-20 RX ADMIN — DIGOXIN 125 MCG: 125 TABLET ORAL at 09:56

## 2020-10-20 RX ADMIN — GUAIFENESIN 600 MG: 600 TABLET, EXTENDED RELEASE ORAL at 09:56

## 2020-10-20 RX ADMIN — POLYETHYLENE GLYCOL 3350 17 G: 17 POWDER, FOR SOLUTION ORAL at 09:56

## 2020-10-20 RX ADMIN — PIPERACILLIN AND TAZOBACTAM 3.38 G: 3; .375 INJECTION, POWDER, LYOPHILIZED, FOR SOLUTION INTRAVENOUS at 13:43

## 2020-10-20 RX ADMIN — INSULIN LISPRO 2 UNITS: 100 INJECTION, SOLUTION INTRAVENOUS; SUBCUTANEOUS at 13:50

## 2020-10-20 ASSESSMENT — PAIN SCALES - GENERAL
PAINLEVEL_OUTOF10: 0

## 2020-10-20 NOTE — DISCHARGE SUMMARY
Hospital Discharge Summary    Patient's PCP: Сергей THACKER  Admit Date: 10/17/2020   Discharge Date: 10/20/2020    Admitting Physician: Dr. Letty Au MD  Discharge Physician: Dr. Luciana Wong:   IP CONSULT TO HOSPITALIST  IP CONSULT TO NEPHROLOGY  IP CONSULT TO NEUROLOGY  IP CONSULT TO SOCIAL WORK  IP CONSULT TO PHARMACY    Brief HPI: The patient is a 80 y.o. male with complex medical history including Parkinson's disease, a fib, CAD, HTN, who presents to [facility] from his assisted living facility with several concerns. Wife reports that patient had increased frequent of falls few weeks ago. He went to his cardiologist office to pacemaker check and his BP was in the 80's, he was referred for admission to [facility]. Those records are not available at this time, but per ER report, he was diagnosed with adrenal insufficiency based on SIM test and initially treated with IV steroids and transitioned to oral solucortef 15 in am 7.5 afternoon. He was just discharged yesterday. They were unable to fill new medication, it was not available at multiple pharmacies. Overnight patient was urinating frequently and his urine color was dark on the last day of his hospital stay at [facility]. This morning patient was sitting in a recliner chair and wife witnessed what is sounds like either near syncope or absence spell. Patient was awake with eyes open but non verbal and did not follow commands. Per patient- he was awake and was able to hear but could not talk back. There was no full LOC, tremors, loss of bowel or bladder control. Brief hospital course:   1. Spell of abnormal consciousness:  Concerning for either near syncope or complex partial seizure  - Head CT non acute. Orthostatics were positive from laying to sitting. Has multiple reasons for orthostasis.    -add PITA hose  -Patient had diagnosis of partial complex seizures in the past, but was taken off tegretol  -Neurology consulted. -Continue Lamotrigine 25 mg BID. Will need further titration of Lamotrigine dose as an outpatient   - Continue home Sinemet   EEg without seizures. Follow up with [address] [place] [address] [address] Neurology, Dr. Anuradha Cha in 2 weeks after discharge     2. Recently diagnosed adrenal insufficiency:  cont oral solucortef. Will need meds to bed prior to discharge.      3. Parkinson's disease:  Resumed home medications, PT/OT     4. UTI:  UA is abnormal and patient had frequency. Historically had enterococcal UTIs. 5. A fib, s/p PPM, CAD:  Eliquis, statin, dig, metoprolol     6. Hyponatremia:  Per history- hypovolemic hyponatremia, poor solute intake. Consulted nephrology. Off salt tabs at d/c      7. DM type 2:  Take 44 U triseba at home.      Invasive procedures:  None     Discharge Diagnoses: Active Problems:    UTI (urinary tract infection)  Resolved Problems:    * No resolved hospital problems. *      Physical Exam: /80   Pulse 78   Temp 98.1 °F (36.7 °C) (Oral)   Resp 17   Ht 6' 1\" (1.854 m)   Wt 217 lb 13 oz (98.8 kg)   SpO2 98%   BMI 28.74 kg/m²      General appearance: No apparent distress, appears stated age and cooperative. Lungs: Clear to ascultation, bilaterally without Rales/Wheezes/Rhonchi with good respiratory effort. Heart: Regular rate and rhythm with Normal S1/S2 without  murmurs, rubs or gallops, point of maximum impulse non-displaced  Abdomen: Soft, non-tender or non-distended without rigidity or guarding and positive bowel sounds all four quadrants. Extremities: No clubbing, cyanosis, or edema bilaterally. Skin: Skin color, texture, turgor normal.    Neurologic: Alert and oriented X 3,   grossly non-focal  Mental status: Alert, oriented, thought content appropriate. Significant diagnostic studies that may require follow up:  Xr Hand Left (min 3 Views)    Result Date: 10/9/2020  Left hand 3 views HISTORY: Patient fell with pain No acute fracture.  There is lateral subluxation of the first metacarpal relative to the trapezium. Corresponding joint space is severely narrowed with periarticular sclerosis. . Probable fibro-osseous cyst within the scaphoid and also within the capitate severe degenerative changes also involve the distal interphalangeal joints of the second through fifth digits and also the proximal  interphalangeal joints of the third through fifth digits Chondrocalcinosis of the triangular fibrocartilage is probably degenerative in this case. Vascular calcification     Marked arthropathic changes as described. No fracture Right hand 3 views HISTORY: Same Severe degenerative changes involve the trapezium-first metacarpal joint with mild lateral subluxation of the first metacarpal. Fibro-osseous cysts of the capitate and lunate and possibly also the scaphoid. No fracture identified. Severe joint space narrowing at the second and fifth proximal interphalangeal joints and severe narrowing of the distal interphalangeal joints of all digits. Chondrocalcinosis of the triangular fibrocartilage is probably degenerative. Vascular calcification IMPRESSION: No fracture. Severe arthropathic changes as described    Xr Hand Right (min 3 Views)    Result Date: 10/9/2020  Left hand 3 views HISTORY: Patient fell with pain No acute fracture. There is lateral subluxation of the first metacarpal relative to the trapezium. Corresponding joint space is severely narrowed with periarticular sclerosis. . Probable fibro-osseous cyst within the scaphoid and also within the capitate severe degenerative changes also involve the distal interphalangeal joints of the second through fifth digits and also the proximal  interphalangeal joints of the third through fifth digits Chondrocalcinosis of the triangular fibrocartilage is probably degenerative in this case. Vascular calcification     Marked arthropathic changes as described.  No fracture Right hand 3 views HISTORY: Same Severe degenerative changes involve the trapezium-first metacarpal joint with mild lateral subluxation of the first metacarpal. Fibro-osseous cysts of the capitate and lunate and possibly also the scaphoid. No fracture identified. Severe joint space narrowing at the second and fifth proximal interphalangeal joints and severe narrowing of the distal interphalangeal joints of all digits. Chondrocalcinosis of the triangular fibrocartilage is probably degenerative. Vascular calcification IMPRESSION: No fracture. Severe arthropathic changes as described    Ct Head Wo Contrast    Result Date: 10/17/2020  PROCEDURE: CT head without contrast INDICATION: AMS; COMPARISON: 10/9/2020 TECHNIQUE: CT head was performed without contrast according to standard protocol. Axial images and multiplanar reformatted images reviewed. Up-to-date CT equipment and radiation dose reduction techniques were employed. FINDINGS: No acute intra- or extra-axial fluid collections are identified. There is moderate cerebral volume loss with associated ventricular dilatation. The basilar cisterns are patent. No mass effect or midline shift is seen. The gray-white matter differentiation is normal. Mild periventricular white matter hypoattenuation is indicative of chronic small vessel ischemic disease. There is chronic right maxillary sinus. Bilateral lens placement are noted. The mastoids appear normal. No acute fracture  is identified. 1.  No acute intracranial process. 2.  Moderate cerebral atrophy and mild chronic small vessel ischemic disease. Ct Head Wo Contrast    Result Date: 10/9/2020     CT head without IV HISTORY: Patient fell with head trauma; evaluate for intracranial hemorrhage or cerebrovascular accident Scans through the brain without IV contrast. Comparison 7/2/2020. Up-to-date CT equipment with radiation dose reduction techniques Calvarium intact. No cephalhematoma. Mastoid air cell complexes clear.  Mucosal thickening enveloping the right maxillary antrum, an wires are fractured, unchanged. Left subclavian pacemaker/AICD. Lungs: There is mild prominence of the vascular structures with indistinctness. No focal airspace consolidation. No pneumothorax or pleural effusion. Heart and Mediastinum: Mild cardiomegaly. Atherosclerotic plaque in the aorta. Other: N/A. Mild pulmonary edema pattern. Treatments: As above.       Discharge Medications:     Medication List      START taking these medications    lamoTRIgine 25 MG tablet  Commonly known as:  LAMICTAL  Take 1 tablet by mouth 2 times daily        CHANGE how you take these medications    atorvastatin 20 MG tablet  Commonly known as:  LIPITOR  Take 1 tablet by mouth nightly  What changed:  medication strength        CONTINUE taking these medications    acetaminophen 325 MG tablet  Commonly known as:  TYLENOL     bisacodyl 10 MG suppository  Commonly known as:  DULCOLAX     carbidopa-levodopa  MG per tablet  Commonly known as:  SINEMET     cyanocobalamin 1000 MCG tablet     digoxin 125 MCG tablet  Commonly known as:  LANOXIN     docusate sodium 100 MG capsule  Commonly known as:  COLACE     DULoxetine 60 MG extended release capsule  Commonly known as:  CYMBALTA     Eliquis 5 MG Tabs tablet  Generic drug:  apixaban     guaiFENesin 600 MG extended release tablet  Commonly known as:  MUCINEX     * hydrocortisone 5 MG tablet  Commonly known as:  CORTEF     * hydrocortisone 5 MG tablet  Commonly known as:  CORTEF     Linzess 145 MCG capsule  Generic drug:  linaclotide     LORazepam 1 MG tablet  Commonly known as:  ATIVAN     magnesium hydroxide 400 MG/5ML suspension  Commonly known as:  MILK OF MAGNESIA     menthol-zinc oxide 0.44-20.625 % Oint ointment  Commonly known as:  CALMOSEPTINE     metoprolol tartrate 50 MG tablet  Commonly known as:  LOPRESSOR     mupirocin 2 % ointment  Commonly known as:  BACTROBAN     NovoLOG 100 UNIT/ML injection vial  Generic drug:  insulin aspart     oxyCODONE 5 MG immediate

## 2020-10-20 NOTE — PROGRESS NOTES
CLINICAL PHARMACY NOTE: MEDS TO [address] Select Patient?: No  Total # of Prescriptions Filled: 3   The following medications were delivered to the patient:  · Lamotrigine 25mg  · Amoxicillin 500mg  Total # of Interventions Completed: 2  Time Spent (min): 60    Additional Documentation:

## 2020-10-20 NOTE — PROGRESS NOTES
Tele and IV removed. Patient dressed and taken down to his wife in a wheelchair. No issues. Patient sent with meds to beds.      Electronically signed by Nia Hermosillo RN on 10/20/2020 at 4:54 PM

## 2020-10-20 NOTE — PLAN OF CARE
Problem: Falls - Risk of:  Goal: Will remain free from falls  Description: Pt with absence of falls this shift. Pt has nonskid socks in place and bed alarm is on. Pt demonstrates safety awareness and calls to RN appropriately. Will continue to monitor. 10/20/2020 0259 by Eliza Deras RN  Outcome: Ongoing  Note: Pt with absence of falls this shift. Pt has nonskid socks in place and bed alarm is on. Pt demonstrates safety awareness and calls to RN appropriately. Will continue to monitor. Problem: Safety:  Goal: Ability to remain free from injury will improve  Description: No evidence of seizure activity this shift. Pt A&O x4. Neuro WNL. Will continue to monitor. 10/20/2020 0259 by Eliza Deras RN  Outcome: Ongoing  Note: Pt remains free of seizure activity this shift. Awaiting results of awake and sleep EEG. Trending NA q6h. Will continue to monitor and reassess.

## 2020-10-20 NOTE — PROGRESS NOTES
Clinical Pharmacy Progress Note  Discharge Planning    Spoke with patient's wife at request of nursing staff. Patient's wife expressed concern that they have not been able to get hydrocortisone tablets filled d/t shortages at [facility].     Spoke with Dr. Pardeep Rivera -- will fill hydrocortisone on discharge for patient via Meds to [place]. Called in discharge prescription for the following to [facility]:  Hydrocortisone 5 mg tablets - Take 15 mg PO in AM and 7.5 mg PO in QPM  Qty #135  Refills #1 per Dr. Randall Montes De Oca, RN and patient's wife. Thanks!   Geannie Cogan, PharmD, [facility]  Wireless # [phone]  10/20/2020 10:49 AM

## 2020-10-20 NOTE — PROGRESS NOTES
Hospitalist Progress Note      PCP: Elena Bertrand    Date of Admission: 10/17/2020    Chief Complaint on Admission: staring spell, falls    Pt Seen/Examined and Chart Reviewed. Admitting dx as above, UTI    SUBJECTIVE/OBJECTIVE:   The patient is a 80 y.o. male with complex medical history including Parkinson's disease, a fib, CAD, HTN, recently diagnosed adrenal insufficiency, who presented to [facility] from his assisted living facility with several concerns- frequent urination, falls, staring spell. Also could not get steroid filled as outpatient, missed one day of solucortef. Patient sitting up in the chair today patient reports feeling much better overall. He has no symptoms when he is laying in bed. Urinating better. No dyspnea.    -Blood pressure was slightly low in the morning better now  EEG pending    Allergies  Gabapentin and Morphine    Medications      Scheduled Meds:   sodium chloride  1 g Oral TID WC    insulin glargine  18 Units Subcutaneous Daily    insulin lispro  0-12 Units Subcutaneous TID WC    insulin lispro  0-6 Units Subcutaneous Nightly    lamoTRIgine  25 mg Oral BID    piperacillin-tazobactam  3.375 g Intravenous Q8H    sodium chloride flush  10 mL Intravenous 2 times per day    hydrocortisone  15 mg Oral Daily    hydrocortisone  7.5 mg Oral QPM    influenza virus vaccine  0.5 mL Intramuscular Once    apixaban  5 mg Oral BID    atorvastatin  20 mg Oral Nightly    carbidopa-levodopa  1 tablet Oral TID    cyanocobalamin  1,000 mcg Oral Daily    digoxin  125 mcg Oral Daily    docusate sodium  100 mg Oral BID    DULoxetine  60 mg Oral QPM    guaiFENesin  600 mg Oral BID    linaclotide  145 mcg Oral QAM AC    metoprolol tartrate  25 mg Oral BID    mupirocin   Topical Daily    polyethylene glycol  17 g Oral Daily    tamsulosin  0.4 mg Oral Nightly    traZODone  200 mg Oral Nightly       Infusions:   dextrose         PRN Meds:  glucose, dextrose, glucagon (rDNA), dextrose, sodium chloride flush, acetaminophen **OR** acetaminophen, polyethylene glycol, promethazine **OR** ondansetron, LORazepam, oxyCODONE    Vitals    TEMPERATURE:  Current - Temp: 97.5 °F (36.4 °C); Max - Temp  Av.9 °F (36.6 °C)  Min: 97.4 °F (36.3 °C)  Max: 98.4 °F (36.9 °C)  RESPIRATIONS RANGE: Resp  Av  Min: 18  Max: 18  PULSE RANGE: Pulse  Av  Min: 70  Max: 87  BLOOD PRESSURE RANGE:  Systolic (08YEN), AIB:171 , Min:98 , YXS:366   ; Diastolic (48XYJ), DVC:67, Min:61, Max:91    PULSE OXIMETRY RANGE: SpO2  Av.5 %  Min: 96 %  Max: 99 %  24HR INTAKE/OUTPUT:      Intake/Output Summary (Last 24 hours) at 10/20/2020 0726  Last data filed at 10/20/2020 0606  Gross per 24 hour   Intake 1340 ml   Output 1050 ml   Net 290 ml       Exam:      General appearance: No apparent distress, appears stated age and cooperative. Lungs: Clear to ascultation, bilaterally without Rales/Wheezes/Rhonchi with good respiratory effort. Heart: Regular rate and rhythm with Normal S1/S2 without  murmurs, rubs or gallops, point of maximum impulse non-displaced  Abdomen: Soft, non-tender or non-distended without rigidity or guarding and positive bowel sounds all four quadrants. Extremities: No clubbing, cyanosis, or edema bilaterally. Skin: Skin color, texture, turgor normal.    Neurologic: Alert and oriented X 3,   grossly non-focal  Mental status: Alert, oriented, thought content appropriate.         Data    Recent Labs     10/17/20  1313 10/19/20  0341   WBC 7.4 5.9   HGB 11.0* 10.5*   HCT 33.5* 31.7*   * 116*      Recent Labs     10/18/20  0310  10/19/20  0341  10/19/20  1734 10/19/20  2313 10/20/20  0548   *   < > 133*   < > 128* 129* 131*  130*   K 4.2  --  4.5  --   --   --  4.4   CL 96*  --  100  --   --   --  97*   CO2 24  --  24  --   --   --  23   PHOS 2.8  --  2.9  --   --   --  3.1   BUN 15  --  15  --   --   --  17   CREATININE 0.9  --  0.8  --   --   --  0.8    < > = values in this interval not displayed. Recent Labs     10/17/20  1313   AST 23   ALT 7*   BILIDIR 0.3   BILITOT 0.8   ALKPHOS 95     No results for input(s): INR in the last 72 hours. Recent Labs     10/17/20  1313   TROPONINI 0.02*       Consults:     IP CONSULT TO HOSPITALIST  IP CONSULT TO NEPHROLOGY  IP CONSULT TO NEUROLOGY  IP CONSULT TO SOCIAL WORK  IP CONSULT TO Antoniocarl[id number] Problems    Diagnosis Date Noted    UTI (urinary tract infection) [N39.0] 10/17/2020         ASSESSMENT AND PLAN      Spell of abnormal consciousness:  Concerning for either near syncope or absence seizure  monitor on tele. spot EEG pending. Head CT non acute. Orthostatics were positive from laying to sitting. Has multiple reasons for orthostasis. 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 Will need meds to bed prior to discharge.      Parkinson's disease:  Resumed home medications, PT/OT     UTI:  UA is abnormal and patient had frequency. Historically had enterococcal UTIs. cont zosyn until current cx      A fib, s/p PPM, CAD:  Eliquis, statin, dig, metoprolol     Hyponatremia:  Per history- hypovolemic hyponatremia, poor solute intake. Consulted nephrology. Urine osm low. DM type 2:  Take 44 U triseba at home.  Will resume lantus 18 and ISS    DVT Prophylaxis: eliquis  Diet: DIET GENERAL; Daily Fluid Restriction: 1500 ml  Code Status: Full Code    PT/OT Eval Status:pending    Dispo - inpt, 1 to 2 days    Fabiola Ayala MD

## 2020-10-20 NOTE — PROGRESS NOTES
Patient Name: Dwight Clemente                                                    Primary Physician: Bismark Brandon MD  Admitting Dx: UTI (urinary tract infection) [N39.0]  UTI (urinary tract infection) [N39.0]        Nephrology Hospital Progress Note  [facility] Nephrology  [email or web address]                                       Interval Plan:     · Mental status back to normal.  · Sodium now 131  · He is on hydrocortisone. · Discontinue salt tablets    Assessment:     Hyponatremia  · Sodium on consult 127  · Now on steroids for adrenal insufficiency  · Urine sodium is 43, and osm 240  · No decompensated cirrhosis of the liver  Hypotension  · BP better    Please call our office at [phone] or [employer] with any questions or contact me directly. Subjective:     CC / Reason for Consult:  Hyponatremia    HPI/PMH:   80 y.o. male presented to the hospital on 10/17/2020 with altered mental Status. he is unable to provide details. His mental Status getting better. He was at [facility] recently and needed steroids. We are consulted for hyponatremia. He was diagnosed to have adrenal insufficiency at [facility], but missed meds after going home    ROS / Interval Hx: Patient seen in EEG room and no complaints. Eating well. Objective:        Gen: alert, well appearing, and in no distress and oriented to person, place, and time     Neck:  supple, no significant adenopathy     Cardio: normal rate, regular rhythm, normal S1, S2, no murmurs, rubs, clicks or gallops      Resp: clear to auscultation, no wheezes, rales or rhonchi, symmetric air entry. GI:  soft, nontender, nondistended, no masses or organomegaly.       Ext:  peripheral pulses normal, no pedal edema, no clubbing or cyanosis      MS: no joint tenderness, deformity or swelling      DERM: normal coloration and turgor, no rashesor bruising    Vitals:     /80   Pulse 78   Temp 98.1 °F (36.7 °C) (Oral)   Resp 17   Ht 6' 1\" (1.854 m)   Wt 217 lb 13 oz (98.8 kg)   SpO2 98%   BMI 28.74 kg/m²      I/Os: Reviewed    Meds: Reviewed. Please see plan for changes made.     Labs:    Lab Results   Component Value Date    WBC 5.9 10/19/2020    HGB 10.5 10/19/2020    HCT 31.7 10/19/2020    MCV 93.7 10/19/2020     10/19/2020      Lab Results   Component Value Date    CREATININE 0.8 10/20/2020    BUN 17 10/20/2020     10/20/2020     10/20/2020    K 4.4 10/20/2020    K 4.2 10/17/2020    CL 97 10/20/2020    CO2 23 10/20/2020     No components found for: GLU   Lab Results   Component Value Date    CALCIUM 8.8 10/20/2020    PHOS 3.1 10/20/2020      No results found for: [id number]   No results found for: IRON, TIBC, FERRITIN   No results found for: Hillary Zarate

## 2020-10-20 NOTE — DISCHARGE INSTR - COC
Continuity of Care Form    Patient Name: Macho Ramirez   :  1933  MRN:  [id number]    Admit date:  10/17/2020  Discharge date:  ***    Code Status Order: Full Code   Advance Directives:   Advance Care Flowsheet Documentation       Date/Time Healthcare Directive Type of Healthcare Directive Copy in [address] Agent's Name Healthcare Agent's Phone Number    10/17/20 1707  No, patient does not have an advance directive for healthcare treatment -- -- -- -- --            Admitting Physician:  Keara Wilson MD  PCP: [address]    Discharging Nurse: [facility] Unit/Room#: 9369/6651-96  Discharging Unit Phone Number: ***    Emergency Contact:   Extended Emergency Contact Information  Primary Emergency Contact: Karen Workman  Address: [address] [place] [address] [address]  Phone: [phone]  Mobile Phone: [phone]  Relation: Spouse  Secondary Emergency Contact: Terri Law [address] Phone: [phone]  Relation: Child    Past Surgical History:  Past Surgical History:   Procedure Laterality Date    BACK SURGERY      CATARACT REMOVAL WITH IMPLANT      COLONOSCOPY  11    COLONOSCOPY  2015    polyp, diverticulosis    CORONARY ANGIOPLASTY WITH STENT PLACEMENT      CORONARY ARTERY BYPASS GRAFT      EYE SURGERY      JOINT REPLACEMENT      knee    PROSTATE SURGERY      seed implants    SHOULDER SURGERY         Immunization History:   Immunization History   Administered Date(s) Administered    Tdap (Boostrix, Adacel) 2020       Active Problems:  Patient Active Problem List   Diagnosis Code    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 L04.574    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 T84.89XA    Acute CVA (cerebrovascular accident) ([address] I63.9    Hyponatremia E87.1    Atrial fibrillation (HCC) I48.91    Chronic systolic heart failure ([address] I50.22    UTI (urinary tract infection) N39.0       Isolation/Infection:   Isolation            No Isolation          Patient Infection Status       Infection Onset Added Last Indicated Last Indicated By Review Planned Expiration Resolved Resolved By    None active    Resolved    COVID-19 Rule Out 10/17/20 10/17/20 10/17/20 COVID-19 (Ordered)   10/18/20 Rule-Out Test Resulted            Nurse Assessment:  Last Vital Signs: /80   Pulse 78   Temp 98.1 °F (36.7 °C) (Oral)   Resp 17   Ht 6' 1\" (1.854 m)   Wt 217 lb 13 oz (98.8 kg)   SpO2 98%   BMI 28.74 kg/m²     Last documented pain score (0-10 scale): Pain Level: 0  Last Weight:   Wt Readings from Last 1 Encounters:   10/20/20 217 lb 13 oz (98.8 kg)     Mental Status:  {IP PT MENTAL STATUS:20030:::0}    IV Access:  { NORMA IV ACCESS:574846271:::0}    Nursing Mobility/ADLs:  Walking   {CHP DME ADLs:281883954:::0}  Transfer  {CHP DME ADLs:485041302:::0}  Bathing  {CHP DME ADLs:521926595:::0}  Dressing  {CHP DME ADLs:273571449:::0}  Toileting  {CHP DME ADLs:839202803:::0}  Feeding  {CHP DME ADLs:960557780:::0}  Med Admin  {P DME ADLs:178445983:::0}  Med Delivery   { NORMA MED Delivery:624516301:::0}    Wound Care Documentation and Therapy:        Elimination:  Continence: Bowel: {YES / NP:57209}  Bladder: {YES / JH:27221}  Urinary Catheter: {Urinary Catheter:212183112:::0}   Colostomy/Ileostomy/Ileal Conduit: {YES / XT:12538}       Date of Last BM: ***    Intake/Output Summary (Last 24 hours) at 10/20/2020 0924  Last data filed at 10/20/2020 0606  Gross per 24 hour   Intake 1330 ml   Output 1050 ml   Net 280 ml     I/O last 3 completed shifts:   In: 6532 [P.O.:960; I.V.:10; IV Piggyback:370]  Out: 1050 [Urine:1050]    Safety Concerns:     [address] Safety Concerns:121998789:::0}    Impairments/Disabilities:      [address] Impairments/Disabilities:668808109:::0}    Nutrition Therapy:  Current Nutrition Therapy:   [address] NORMA Diet List:714242995:::0}    Routes of Feeding: {CHP DME Other Feedings:256874791:::0}  Liquids: {Slp liquid thickness:96989}  Daily Fluid Restriction: {CHP DME Yes amt example:095015328:::0}  Last Modified Barium Swallow with Video (Video Swallowing Test): {Done Not Done WFWS:017385563:::9}    Treatments at the Time of Hospital Discharge:   Respiratory Treatments: ***  Oxygen Therapy:  {Therapy; copd oxygen:63608:::0}  Ventilator:    {[facility] Vent List:627759957:::0}    Rehab Therapies: {THERAPEUTIC INTERVENTION:2835499653}  Weight Bearing Status/Restrictions: {[facility] Weight Bearin:::0}  Other Medical Equipment (for information only, NOT a DME order):  {EQUIPMENT:769988291}  Other Treatments: ***    Patient's personal belongings (please select all that are sent with patient):  {CHP DME Belongings:175313998:::0}    RN SIGNATURE:  {Esignature:528745517:::0}    CASE MANAGEMENT/SOCIAL WORK SECTION    Inpatient Status Date: ***    Readmission Risk Assessment Score:  Readmission Risk              Risk of Unplanned Readmission:        29           Discharging to Facility/ Agency   Name:   Address:  Phone:  Fax:    Dialysis Facility (if applicable)   Name:  Address:  Dialysis Schedule:  Phone:  Fax:    / signature: {Esignature:396871154:::0}    PHYSICIAN SECTION    Prognosis: Fair    Condition at Discharge: Stable    Rehab Potential (if transferring to Rehab): Fair    Recommended Labs or Other Treatments After Discharge: Pt, OT, nursing     Physician Certification: I certify the above information and transfer of Puneet Jiang  is necessary for the continuing treatment of the diagnosis listed and that he requires Home Care for less 30 days.      Update Admission H&P: No change in H&P    PHYSICIAN SIGNATURE:  Electronically signed by Audra Goldstein MD on 10/20/20 at 9:24 AM EDT

## 2020-10-20 NOTE — CARE COORDINATION
CTN faxed referral to Mariana Riley at [facility] Electronically signed by Eneida Monroe LPN on 13/73/3089 at 12:45 PM

## 2020-10-20 NOTE — PROCEDURES
Jina Harmon is a 80 y.o. male patient. 1. Acute cystitis without hematuria    2. Adrenal insufficiency (HCC)      Past Medical History:   Diagnosis Date    Arthritis     Back pain     CAD (coronary artery disease)     Cancer (HCC)     prostate    Diabetes mellitus ([facility]     Hyperlipidemia     Hypertension     Rosacea      Blood pressure 118/80, pulse 78, temperature 98.1 °F (36.7 °C), temperature source Oral, resp. rate 17, height 6' 1\" (1.854 m), weight 217 lb 13 oz (98.8 kg), SpO2 98 %. EEG awake and drowsy    Date/Time: 10/20/2020 9:06 AM  Performed by: Liv Barahona MD  Authorized by: Lucillie Simmonds, MD       CLINICAL HISTORY:  Jina Harmon is a 80 y.o. male with hx of back pain, CAD, prostate cancer, DM, HTN, HLD, Atrial fibrillation, Parkinsons Disease who presented to the ED on 10/17/20 from his assisted living facility for evaluation of Altered mental status.       On the day of admission the patient was reportedly sitting in his recliner, when his wife saw that he sliding out of it. He was awake with his eyes open and staring off. The patient was non verbal and not following commands. No reports of full loss of consciousness, tremors, loss of bowel or bladder control. It is uncertain how long symptoms lasted for. EMS was notified and fingerstick glucose and vitals in route were unremarkable.    Of note the patient has had increased falls for the last several weeks. He was evaluated in 8/2019 thought to have complex partial seizure by Neurology. Recommended starting Carbamazepine unclear if it was ever started. EEG was slowing at that time.    Clinical concern is for complex partial or simple partial seizures or subclinical status epilepticus. FINDINGS: This is a routine 16 channel referential and bipolar video EEG. There is a background rhythm in the theta range, without a posterior dominant rhythm. It does attenuate appropriately with eye opening.  Photic stimulation is performed without driving or abnormality. Hyperventilation is not performed. No epileptiform abnormalities are noted. No electrographic seizures are recorded. IMPRESSION:  This is an abnormal awake and drowsy EEG due to the presence of mild generalized slowing. No focal or epileptiform abnormalities.       Aaron Mckeon MD  10/20/2020

## 2020-10-20 NOTE — PROGRESS NOTES
OK to discharge patient per Dr. Diana Goetz. Patient's wife called for ride. Will  medications from outpatient pharmacy.      Electronically signed by Vane Burkett RN on 10/20/2020 at 3:46 PM

## 2020-10-20 NOTE — PROGRESS NOTES
Spoke with MD Kath Sparrow on phone and notified of sodium level result of 129. Per orders, will redraw sodium at 0600 and will notify MD if sodium less than 128.

## 2020-10-20 NOTE — CARE COORDINATION
Case Management Assessment            Discharge Note                    Date / Time of Note: 10/20/2020 11:30 AM                  Discharge Note Completed by: Mary Singletary    Patient Name: Marcie Bañuelos   YOB: 1933  Diagnosis: UTI (urinary tract infection) [N39.0]  UTI (urinary tract infection) [N39.0]   Date / Time: 10/17/2020 12:12 PM    Current PCP: Kade Birch patient: No    Hospitalization in the last 30 days: No    Advance Directives:  Code Status: Full Code  [place] DNR form completed and on chart: Not Indicated    Financial:  Payor: MEDICARE / Plan: MEDICARE PART A AND B / Product Type: *No Product type* /      Pharmacy:    [place] [address] Kaylin Ortega [phone] - F [phone]  [address] [facility] [address]  Phone: [phone] Fax: [phone]      Assistance purchasing medications?:    Assistance provided by Case Management: None at this time    Does patient want to participate in local refill/ meds to beds program?:      Meds To Beds General Rules:  1. Can ONLY be done Monday- Friday between 8:30am-5pm  2. Prescription(s) must be in pharmacy by 3pm to be filled same day  3. Copy of patient's insurance/ prescription drug card and patient face sheet must be sent along with the prescription(s)  4. Cost of Rx cannot be added to hospital bill. If financial assistance is needed, please contact unit  or ;  or  CANNOT provide pharmacy voucher for patients co-pays  5.  Patients can then  the prescription on their way out of the hospital at discharge, or pharmacy can deliver to the bedside if staff is available. (payment due at time of pick-up or delivery - cash, check, or card accepted)     Able to afford home medications/ co-pay costs: Yes    ADLS:  Current PT AM-PAC Score: 15 /24  Current OT AM-PAC Score: 17 /24      DISCHARGE Disposition: [facility] [phone], [facility]    LOC at discharge: Not Applicable  NORMA Completed: Yes    Notification completed in HENS/PAS?:  Not Applicable    IMM Completed:   Yes, Case management has presented and reviewed IMM letter #2 to the patient and/or family/ POA. Patient and/or family/POA verbalized understanding of their medicare rights and appeal process if needed. Patient and/or family/POA has signed, initialed and placed today's date (t) and time (11:00am) on IMM letter #2 on the the appropriate lines. Patient and/or family/POA, copy of letter offered and they are aware that this original copy of IMM letter #2 is available prior to discharge from the paper chart on the unit. Electronic documentation has been entered into epic for IMM letter #2 and original paper copy has been added to the paper chart at the nurses station. Transportation:  Transportation PLAN for discharge: family   Mode of Transport: Private Car  Reason for medical transport: Not Applicable  Name of [address] [place] [address],[address]: Not Applicable      Transport form completed: Not Indicated    Home Care:  [address] ordered at discharge: Yes  [address]: [facility]   Phone: [phone]  Fax: [phone]  Orders faxed: Yes    Durable Medical Equipment:  DME Provider: n/a  Equipment obtained during hospitalization: none    Home Oxygen and Respiratory Equipment:  Oxygen needed at discharge?: Not [address]: Not Applicable  Portable tank available for discharge?: Not Indicated    Dialysis:  Dialysis patient: No    Dialysis Center:  Not Applicable    Hospice Services:  Location: Not Applicable  Agency: Not Applicable    Consents signed: Not Indicated    Referrals made at [facility] for outpatient continued care:  Not Applicable    Additional CM Notes: Pt to discharge back to Assisted Living at [facility] [address] [place] [address]. Facility notified. Report # [id number], Fax # [phone].  Wife to transport, orders faxed to [facility]. Pt also has private duty over night aide. The Plan for Transition of Care is related to the following treatment goals of UTI (urinary tract infection) [N39.0]  UTI (urinary tract infection) [N39.0]    The Patient and/or patient representative Alton Bucio and his family were provided with a choice of provider and agrees with the discharge plan Yes    Freedom of choice list was provided with basic dialogue that supports the patient's individualized plan of care/goals and shares the quality data associated with the providers.  Yes    Care Transitions patient: No    Huan Tracy  Case Management Department  Ph: [phone]  Fax: [phone]

## 2020-11-16 PROBLEM — N39.0 UTI (URINARY TRACT INFECTION): Status: RESOLVED | Noted: 2020-10-17 | Resolved: 2020-11-16

## 2020-11-22 ENCOUNTER — APPOINTMENT (OUTPATIENT)
Dept: CT IMAGING | Age: 85
DRG: 444 | End: 2020-11-22
Payer: MEDICARE

## 2020-11-22 ENCOUNTER — HOSPITAL ENCOUNTER (INPATIENT)
Age: 85
LOS: 8 days | Discharge: HOSPICE | DRG: 444 | End: 2020-11-30
Attending: EMERGENCY MEDICINE
Payer: MEDICARE

## 2020-11-22 PROBLEM — K81.0 ACUTE CHOLECYSTITIS: Status: ACTIVE | Noted: 2020-11-22

## 2020-11-22 LAB
ALBUMIN SERPL-MCNC: 3.9 G/DL (ref 3.4–5)
ALP BLD-CCNC: 117 U/L (ref 40–129)
ALT SERPL-CCNC: 20 U/L (ref 10–40)
ANION GAP SERPL CALCULATED.3IONS-SCNC: 13 MMOL/L (ref 3–16)
AST SERPL-CCNC: 55 U/L (ref 15–37)
BACTERIA: ABNORMAL /HPF
BASOPHILS ABSOLUTE: 0 K/UL (ref 0–0.2)
BASOPHILS RELATIVE PERCENT: 0.6 %
BILIRUB SERPL-MCNC: 1.4 MG/DL (ref 0–1)
BILIRUBIN DIRECT: 0.4 MG/DL (ref 0–0.3)
BILIRUBIN URINE: NEGATIVE
BILIRUBIN, INDIRECT: 1 MG/DL (ref 0–1)
BLOOD, URINE: NEGATIVE
BUN BLDV-MCNC: 19 MG/DL (ref 7–20)
CALCIUM SERPL-MCNC: 9.2 MG/DL (ref 8.3–10.6)
CHLORIDE BLD-SCNC: 96 MMOL/L (ref 99–110)
CLARITY: CLEAR
CO2: 21 MMOL/L (ref 21–32)
COLOR: YELLOW
CREAT SERPL-MCNC: 0.8 MG/DL (ref 0.8–1.3)
DIGOXIN LEVEL: 0.7 NG/ML (ref 0.8–2)
DIGOXIN LEVEL: 0.7 NG/ML (ref 0.8–2)
EOSINOPHILS ABSOLUTE: 0.1 K/UL (ref 0–0.6)
EOSINOPHILS RELATIVE PERCENT: 1 %
EPITHELIAL CELLS, UA: ABNORMAL /HPF (ref 0–5)
GFR AFRICAN AMERICAN: >60
GFR NON-AFRICAN AMERICAN: >60
GLUCOSE BLD-MCNC: 148 MG/DL (ref 70–99)
GLUCOSE BLD-MCNC: 182 MG/DL (ref 70–99)
GLUCOSE BLD-MCNC: 230 MG/DL (ref 70–99)
GLUCOSE URINE: NEGATIVE MG/DL
HCT VFR BLD CALC: 37.2 % (ref 40.5–52.5)
HEMOGLOBIN: 11.9 G/DL (ref 13.5–17.5)
KETONES, URINE: NEGATIVE MG/DL
LEUKOCYTE ESTERASE, URINE: NEGATIVE
LIPASE: 7 U/L (ref 13–60)
LYMPHOCYTES ABSOLUTE: 0.7 K/UL (ref 1–5.1)
LYMPHOCYTES RELATIVE PERCENT: 9 %
MCH RBC QN AUTO: 31.5 PG (ref 26–34)
MCHC RBC AUTO-ENTMCNC: 32 G/DL (ref 31–36)
MCV RBC AUTO: 98.5 FL (ref 80–100)
MICROSCOPIC EXAMINATION: YES
MONOCYTES ABSOLUTE: 0.6 K/UL (ref 0–1.3)
MONOCYTES RELATIVE PERCENT: 7.2 %
NEUTROPHILS ABSOLUTE: 6.8 K/UL (ref 1.7–7.7)
NEUTROPHILS RELATIVE PERCENT: 82.2 %
NITRITE, URINE: NEGATIVE
PDW BLD-RTO: 18.8 % (ref 12.4–15.4)
PERFORMED ON: ABNORMAL
PERFORMED ON: ABNORMAL
PH UA: 5.5 (ref 5–8)
PLATELET # BLD: 94 K/UL (ref 135–450)
PLATELET SLIDE REVIEW: ABNORMAL
PMV BLD AUTO: 8 FL (ref 5–10.5)
POTASSIUM REFLEX MAGNESIUM: 5 MMOL/L (ref 3.5–5.1)
PRO-BNP: 4809 PG/ML (ref 0–449)
PROTEIN UA: 30 MG/DL
RBC # BLD: 3.78 M/UL (ref 4.2–5.9)
RBC UA: ABNORMAL /HPF (ref 0–4)
SLIDE REVIEW: ABNORMAL
SODIUM BLD-SCNC: 130 MMOL/L (ref 136–145)
SPECIFIC GRAVITY UA: 1.02 (ref 1–1.03)
TOTAL PROTEIN: 6.7 G/DL (ref 6.4–8.2)
TROPONIN: 0.03 NG/ML
URINE TYPE: ABNORMAL
UROBILINOGEN, URINE: 4 E.U./DL
WBC # BLD: 8.3 K/UL (ref 4–11)
WBC UA: ABNORMAL /HPF (ref 0–5)

## 2020-11-22 PROCEDURE — 85025 COMPLETE CBC W/AUTO DIFF WBC: CPT

## 2020-11-22 PROCEDURE — 2580000003 HC RX 258: Performed by: INTERNAL MEDICINE

## 2020-11-22 PROCEDURE — 6360000004 HC RX CONTRAST MEDICATION: Performed by: EMERGENCY MEDICINE

## 2020-11-22 PROCEDURE — 93005 ELECTROCARDIOGRAM TRACING: CPT | Performed by: EMERGENCY MEDICINE

## 2020-11-22 PROCEDURE — U0003 INFECTIOUS AGENT DETECTION BY NUCLEIC ACID (DNA OR RNA); SEVERE ACUTE RESPIRATORY SYNDROME CORONAVIRUS 2 (SARS-COV-2) (CORONAVIRUS DISEASE [COVID-19]), AMPLIFIED PROBE TECHNIQUE, MAKING USE OF HIGH THROUGHPUT TECHNOLOGIES AS DESCRIBED BY CMS-2020-01-R: HCPCS

## 2020-11-22 PROCEDURE — 80162 ASSAY OF DIGOXIN TOTAL: CPT

## 2020-11-22 PROCEDURE — 83880 ASSAY OF NATRIURETIC PEPTIDE: CPT

## 2020-11-22 PROCEDURE — 80048 BASIC METABOLIC PNL TOTAL CA: CPT

## 2020-11-22 PROCEDURE — 99223 1ST HOSP IP/OBS HIGH 75: CPT | Performed by: SURGERY

## 2020-11-22 PROCEDURE — 2580000003 HC RX 258: Performed by: STUDENT IN AN ORGANIZED HEALTH CARE EDUCATION/TRAINING PROGRAM

## 2020-11-22 PROCEDURE — 36415 COLL VENOUS BLD VENIPUNCTURE: CPT

## 2020-11-22 PROCEDURE — 74177 CT ABD & PELVIS W/CONTRAST: CPT

## 2020-11-22 PROCEDURE — 6370000000 HC RX 637 (ALT 250 FOR IP): Performed by: INTERNAL MEDICINE

## 2020-11-22 PROCEDURE — 83690 ASSAY OF LIPASE: CPT

## 2020-11-22 PROCEDURE — 1200000000 HC SEMI PRIVATE

## 2020-11-22 PROCEDURE — 84484 ASSAY OF TROPONIN QUANT: CPT

## 2020-11-22 PROCEDURE — 96374 THER/PROPH/DIAG INJ IV PUSH: CPT

## 2020-11-22 PROCEDURE — 99285 EMERGENCY DEPT VISIT HI MDM: CPT

## 2020-11-22 PROCEDURE — 80076 HEPATIC FUNCTION PANEL: CPT

## 2020-11-22 PROCEDURE — 6360000002 HC RX W HCPCS: Performed by: NURSE PRACTITIONER

## 2020-11-22 PROCEDURE — 81001 URINALYSIS AUTO W/SCOPE: CPT

## 2020-11-22 PROCEDURE — 83036 HEMOGLOBIN GLYCOSYLATED A1C: CPT

## 2020-11-22 PROCEDURE — U0002 COVID-19 LAB TEST NON-CDC: HCPCS

## 2020-11-22 PROCEDURE — 6360000002 HC RX W HCPCS: Performed by: STUDENT IN AN ORGANIZED HEALTH CARE EDUCATION/TRAINING PROGRAM

## 2020-11-22 RX ORDER — ONDANSETRON 2 MG/ML
4 INJECTION INTRAMUSCULAR; INTRAVENOUS EVERY 6 HOURS PRN
Status: DISCONTINUED | OUTPATIENT
Start: 2020-11-22 | End: 2020-11-30 | Stop reason: HOSPADM

## 2020-11-22 RX ORDER — DEXTROSE MONOHYDRATE 50 MG/ML
100 INJECTION, SOLUTION INTRAVENOUS PRN
Status: DISCONTINUED | OUTPATIENT
Start: 2020-11-22 | End: 2020-11-30 | Stop reason: HOSPADM

## 2020-11-22 RX ORDER — TAMSULOSIN HYDROCHLORIDE 0.4 MG/1
0.4 CAPSULE ORAL NIGHTLY
Status: DISCONTINUED | OUTPATIENT
Start: 2020-11-22 | End: 2020-11-30 | Stop reason: HOSPADM

## 2020-11-22 RX ORDER — PROMETHAZINE HYDROCHLORIDE 25 MG/1
12.5 TABLET ORAL EVERY 6 HOURS PRN
Status: DISCONTINUED | OUTPATIENT
Start: 2020-11-22 | End: 2020-11-30 | Stop reason: HOSPADM

## 2020-11-22 RX ORDER — DEXTROSE MONOHYDRATE 25 G/50ML
12.5 INJECTION, SOLUTION INTRAVENOUS PRN
Status: DISCONTINUED | OUTPATIENT
Start: 2020-11-22 | End: 2020-11-30 | Stop reason: HOSPADM

## 2020-11-22 RX ORDER — LAMOTRIGINE 25 MG/1
25 TABLET ORAL 2 TIMES DAILY
Status: DISCONTINUED | OUTPATIENT
Start: 2020-11-22 | End: 2020-11-30 | Stop reason: HOSPADM

## 2020-11-22 RX ORDER — DULOXETIN HYDROCHLORIDE 30 MG/1
60 CAPSULE, DELAYED RELEASE ORAL EVERY EVENING
Status: DISCONTINUED | OUTPATIENT
Start: 2020-11-22 | End: 2020-11-23

## 2020-11-22 RX ORDER — TRAZODONE HYDROCHLORIDE 100 MG/1
200 TABLET ORAL NIGHTLY
Status: DISCONTINUED | OUTPATIENT
Start: 2020-11-22 | End: 2020-11-30 | Stop reason: HOSPADM

## 2020-11-22 RX ORDER — HYDROCORTISONE 5 MG/1
7.5 TABLET ORAL NIGHTLY
Status: DISCONTINUED | OUTPATIENT
Start: 2020-11-22 | End: 2020-11-22

## 2020-11-22 RX ORDER — DIGOXIN 125 MCG
125 TABLET ORAL DAILY
Status: DISCONTINUED | OUTPATIENT
Start: 2020-11-23 | End: 2020-11-30 | Stop reason: HOSPADM

## 2020-11-22 RX ORDER — HYDROCORTISONE 5 MG/1
15 TABLET ORAL NIGHTLY
Status: DISCONTINUED | OUTPATIENT
Start: 2020-11-22 | End: 2020-11-24

## 2020-11-22 RX ORDER — LANOLIN ALCOHOL/MO/W.PET/CERES
1000 CREAM (GRAM) TOPICAL DAILY
Status: DISCONTINUED | OUTPATIENT
Start: 2020-11-23 | End: 2020-11-30 | Stop reason: HOSPADM

## 2020-11-22 RX ORDER — ACETAMINOPHEN 650 MG/1
650 SUPPOSITORY RECTAL EVERY 6 HOURS PRN
Status: DISCONTINUED | OUTPATIENT
Start: 2020-11-22 | End: 2020-11-27

## 2020-11-22 RX ORDER — DOCUSATE SODIUM 100 MG/1
100 CAPSULE, LIQUID FILLED ORAL 2 TIMES DAILY
Status: DISCONTINUED | OUTPATIENT
Start: 2020-11-22 | End: 2020-11-30 | Stop reason: HOSPADM

## 2020-11-22 RX ORDER — SODIUM CHLORIDE 9 MG/ML
INJECTION, SOLUTION INTRAVENOUS CONTINUOUS
Status: DISCONTINUED | OUTPATIENT
Start: 2020-11-22 | End: 2020-11-23

## 2020-11-22 RX ORDER — FUROSEMIDE 10 MG/ML
40 INJECTION INTRAMUSCULAR; INTRAVENOUS ONCE
Status: COMPLETED | OUTPATIENT
Start: 2020-11-22 | End: 2020-11-22

## 2020-11-22 RX ORDER — SODIUM CHLORIDE 0.9 % (FLUSH) 0.9 %
10 SYRINGE (ML) INJECTION EVERY 12 HOURS SCHEDULED
Status: DISCONTINUED | OUTPATIENT
Start: 2020-11-22 | End: 2020-11-30 | Stop reason: HOSPADM

## 2020-11-22 RX ORDER — ACETAMINOPHEN 325 MG/1
650 TABLET ORAL EVERY 6 HOURS PRN
Status: DISCONTINUED | OUTPATIENT
Start: 2020-11-22 | End: 2020-11-27

## 2020-11-22 RX ORDER — BISACODYL 10 MG
10 SUPPOSITORY, RECTAL RECTAL DAILY PRN
Status: DISCONTINUED | OUTPATIENT
Start: 2020-11-22 | End: 2020-11-30 | Stop reason: HOSPADM

## 2020-11-22 RX ORDER — ATORVASTATIN CALCIUM 20 MG/1
20 TABLET, FILM COATED ORAL NIGHTLY
Status: DISCONTINUED | OUTPATIENT
Start: 2020-11-22 | End: 2020-11-30 | Stop reason: HOSPADM

## 2020-11-22 RX ORDER — POLYETHYLENE GLYCOL 3350 17 G/17G
17 POWDER, FOR SOLUTION ORAL DAILY PRN
Status: DISCONTINUED | OUTPATIENT
Start: 2020-11-22 | End: 2020-11-30 | Stop reason: HOSPADM

## 2020-11-22 RX ORDER — GUAIFENESIN 600 MG/1
600 TABLET, EXTENDED RELEASE ORAL 2 TIMES DAILY
Status: DISCONTINUED | OUTPATIENT
Start: 2020-11-22 | End: 2020-11-23

## 2020-11-22 RX ORDER — INSULIN LISPRO 100 [IU]/ML
0-3 INJECTION, SOLUTION INTRAVENOUS; SUBCUTANEOUS NIGHTLY
Status: DISCONTINUED | OUTPATIENT
Start: 2020-11-22 | End: 2020-11-24

## 2020-11-22 RX ORDER — LORAZEPAM 0.5 MG/1
0.5 TABLET ORAL NIGHTLY PRN
Status: DISCONTINUED | OUTPATIENT
Start: 2020-11-22 | End: 2020-11-30 | Stop reason: HOSPADM

## 2020-11-22 RX ORDER — NICOTINE POLACRILEX 4 MG
15 LOZENGE BUCCAL PRN
Status: DISCONTINUED | OUTPATIENT
Start: 2020-11-22 | End: 2020-11-30 | Stop reason: HOSPADM

## 2020-11-22 RX ORDER — INSULIN LISPRO 100 [IU]/ML
0-6 INJECTION, SOLUTION INTRAVENOUS; SUBCUTANEOUS
Status: DISCONTINUED | OUTPATIENT
Start: 2020-11-22 | End: 2020-11-24

## 2020-11-22 RX ORDER — HYDROCORTISONE 5 MG/1
15 TABLET ORAL DAILY
Status: DISCONTINUED | OUTPATIENT
Start: 2020-11-22 | End: 2020-11-22

## 2020-11-22 RX ORDER — SODIUM CHLORIDE 0.9 % (FLUSH) 0.9 %
10 SYRINGE (ML) INJECTION PRN
Status: DISCONTINUED | OUTPATIENT
Start: 2020-11-22 | End: 2020-11-30 | Stop reason: HOSPADM

## 2020-11-22 RX ORDER — POLYETHYLENE GLYCOL 3350 17 G/17G
17 POWDER, FOR SOLUTION ORAL DAILY
Status: DISCONTINUED | OUTPATIENT
Start: 2020-11-22 | End: 2020-11-30 | Stop reason: HOSPADM

## 2020-11-22 RX ADMIN — FUROSEMIDE 40 MG: 10 INJECTION, SOLUTION INTRAMUSCULAR; INTRAVENOUS at 15:11

## 2020-11-22 RX ADMIN — LORAZEPAM 0.5 MG: 0.5 TABLET ORAL at 21:49

## 2020-11-22 RX ADMIN — IOPAMIDOL 80 ML: 755 INJECTION, SOLUTION INTRAVENOUS at 12:21

## 2020-11-22 RX ADMIN — Medication 10 ML: at 21:14

## 2020-11-22 RX ADMIN — PIPERACILLIN AND TAZOBACTAM 3.38 G: 3; .375 INJECTION, POWDER, LYOPHILIZED, FOR SOLUTION INTRAVENOUS at 18:08

## 2020-11-22 ASSESSMENT — ENCOUNTER SYMPTOMS
ABDOMINAL PAIN: 1
NAUSEA: 0
COUGH: 0
BLOOD IN STOOL: 0
VOMITING: 0
CONSTIPATION: 1
SHORTNESS OF BREATH: 1
CHEST TIGHTNESS: 0

## 2020-11-22 ASSESSMENT — PAIN DESCRIPTION - LOCATION: LOCATION: ABDOMEN

## 2020-11-22 ASSESSMENT — PAIN SCALES - GENERAL
PAINLEVEL_OUTOF10: 0
PAINLEVEL_OUTOF10: 8

## 2020-11-22 ASSESSMENT — PAIN DESCRIPTION - PAIN TYPE: TYPE: ACUTE PAIN

## 2020-11-22 ASSESSMENT — PAIN DESCRIPTION - DESCRIPTORS: DESCRIPTORS: ACHING

## 2020-11-22 NOTE — ED PROVIDER NOTES
bellybutton that is well-healed. He has no CVA tenderness. He has no calf tenderness. He has no edema in his lower extremities. Work-up in the emergency department consisted of labs, urinalysis, EKG, and a CT scan of the abdomen and pelvis. Labs are notable for slightly elevated liver function test, specifically total bilirubin of 1.4. He had a subtherapeutic digoxin level. Is also noted to have some thrombocytopenia. Patient CT scan was notable for bilateral pleural effusions as well as pericystic fluid concerning for acute cholecystitis. At this point, we will plan for admission with surgery consultation to determine whether or not he is a candidate for the operating room versus IR placed percutaneous Brianne drain. Will defer antibiotics at this time. I have also added on BNP as well as troponin due to his pleural effusions, the patient will likely need some diuresis. There is no prior echocardiograms in our system. Please see the nurse practitioner's note for reassessments and final disposition.      Augusto Hernandez MD  11/22/20 9042

## 2020-11-22 NOTE — ED PROVIDER NOTES
[facility]  EMERGENCY DEPARTMENT ENCOUNTER          NURSE PRACTITIONER NOTE       Date of evaluation: 11/22/2020    Chief Complaint     Abdominal Pain      History of Present Illness     Macrina Rivera is a 80 y.o. male with a past medical history of CAD, diabetes, hyperlipidemia, hypertension, CHF, A. fib on digoxin; who presents to the emergency department with a complaint of upper abdominal pain that has been present for the past week. Patient dates the pain waxes and wanes, he cannot tie it to eating or any other activity. He talk to his nurse at his assisted care facility, and they determined that he should be evaluated due to the continuance of the pain. He states that it feels like a cramping type pain when it occurs. He is having no pain currently. Denies radiation into his back, radiation into his chest.  Complain of issues with ongoing constipation for which he drinks apple juice to help, states that he did have a normal bowel movement this morning without blood. Denies associated nausea or vomiting. Denies urinary changes. Patient does have shortness of breath which has been his baseline for the past 6 months, states that they are unclear what is causing this. Initially he stated that he was having some chest pain and when I asked him specifically about this he denies chest pain. He states that he has \"irritation\" sometimes, however no pain currently. Does not feel that the abdominal pain is radiating into his chest.  No radiation into his back. Review of Systems     Review of Systems   Constitutional: Negative. Respiratory: Positive for shortness of breath (ongoing for 6+ months). Negative for cough and chest tightness. Cardiovascular: Negative for chest pain, palpitations and leg swelling. Gastrointestinal: Positive for abdominal pain (intermittent, points to right upper abdomen and epigastric area) and constipation (intermittent, relieved with drinking juice).  Negative for blood in stool, nausea and vomiting. Genitourinary: Negative for dysuria, frequency and urgency. Musculoskeletal: Negative. Skin: Negative. Allergic/Immunologic: Negative for immunocompromised state. Neurological: Negative for dizziness, weakness and light-headedness. Hematological: Does not bruise/bleed easily. Psychiatric/Behavioral: Negative. Past Medical, Surgical, Family, and Social History     He has a past medical history of Arthritis, Back pain, CAD (coronary artery disease), Cancer ([facility] [address] [address] Diabetes mellitus ([facility] Hyperlipidemia, Hypertension, and Rosacea. He has a past surgical history that includes Coronary angioplasty with stent; back surgery; joint replacement; shoulder surgery; eye surgery; Cataract removal with implant; Coronary artery bypass graft; Prostate surgery; Colonoscopy (9/21/11); and Colonoscopy (2/11/2015). His family history includes Cancer in his mother and sister. He reports that he has never smoked. He has never used smokeless tobacco. He reports that he does not drink alcohol or use drugs.     Medications     Previous Medications    ACETAMINOPHEN (TYLENOL) 325 MG TABLET    Take 650 mg by mouth every 4 hours as needed for Pain     APIXABAN (ELIQUIS) 5 MG TABS TABLET    Take 5 mg by mouth 2 times daily    ATORVASTATIN (LIPITOR) 20 MG TABLET    Take 1 tablet by mouth nightly    B COMPLEX-C-E-ZN (STRESS FORMULA/ZINC PO)    Take 1 tablet by mouth daily    BISACODYL (DULCOLAX) 10 MG SUPPOSITORY    Place 10 mg rectally daily as needed for Constipation    CARBIDOPA-LEVODOPA (SINEMET)  MG PER TABLET    Take 1 tablet by mouth 3 times daily    CYANOCOBALAMIN 1000 MCG TABLET    Take 1,000 mcg by mouth daily    DIGOXIN (LANOXIN) 125 MCG TABLET    Take 125 mcg by mouth daily    DOCUSATE SODIUM (COLACE) 100 MG CAPSULE    Take 100 mg by mouth 2 times daily    DULOXETINE (CYMBALTA) 60 MG EXTENDED RELEASE CAPSULE    Take 60 mg by mouth every evening    GUAIFENESIN Pulmonary:      Effort: Pulmonary effort is normal. No respiratory distress. Breath sounds: Normal breath sounds. No rhonchi or rales. Abdominal:      General: Abdomen is protuberant. Bowel sounds are normal. There is no distension. Palpations: Abdomen is soft. There is no shifting dullness or fluid wave. Tenderness: There is abdominal tenderness in the right upper quadrant and epigastric area. Hernia: No hernia is present. Neurological:      General: No focal deficit present. Mental Status: He is alert and oriented to person, place, and time. Psychiatric:         Mood and Affect: Mood normal.         Behavior: Behavior normal.           Diagnostic Results     EKG   Atrial fibrillation with a controlled vent rate of 84 bpm, QRS duration 146ms, QT 412ms nonspecific intraventricular block without acute ST-T wave changes noted; essentially unchanged from previous EKG dated 10/17/2020    RADIOLOGY:  CT ABDOMEN PELVIS W IV CONTRAST Additional Contrast? None   Final Result      Cholelithiasis with gallbladder wall thickening and pericholecystic fluid consistent with acute cholecystitis. Please correlate clinically. Small amount of free ascitic fluid. Moderate bilateral effusions with compressive atelectasis and cardiac enlargement as well as evidence of right heart failure. Small kidneys bilaterally with simple cyst in the left kidney. Severe fatty infiltration of the liver.       Radiation seeds in the prostate                LABS:   Results for orders placed or performed during the hospital encounter of 11/22/20   CBC auto differential   Result Value Ref Range    WBC 8.3 4.0 - 11.0 K/uL    RBC 3.78 (L) 4.20 - 5.90 M/uL    Hemoglobin 11.9 (L) 13.5 - 17.5 g/dL    Hematocrit 37.2 (L) 40.5 - 52.5 %    MCV 98.5 80.0 - 100.0 fL    MCH 31.5 26.0 - 34.0 pg    MCHC 32.0 31.0 - 36.0 g/dL    RDW 18.8 (H) 12.4 - 15.4 %    Platelets 94 (L) 555 - 450 K/uL    MPV 8.0 5.0 - 10.5 fL

## 2020-11-22 NOTE — ED NOTES
Bed: B15-15  Expected date: 11/22/20  Expected time: 11:07 AM  Means of arrival:   Comments:  Medic [address] [place] [address]  11/22/20 1587

## 2020-11-22 NOTE — ED TRIAGE NOTES
Pt states that he has been having 8/10 abdominal that started a couple weeks ago. Pt states he will get stomach cramps then pass gas with small amounts of stool.

## 2020-11-22 NOTE — CONSULTS
General Surgery   Resident Consult Note    Reason for Consult: Acute cholecystitis    History of Present Illness:   Fabien Edmond is a 80 y.o. male with Hx of HTN, HLD, CAD and A. Fib, CHF with an EF of 20-25% on his last ECHO (7/6/2019), a history of seizures, and T2DM (last A1c 6.9) presented to [facility] ED with complaints of right upper quadrant abdominal pain that has been going on for a couple months. The patient's wife states that the pain has really been bad in the past 10 days. The pain is described as an intermittent \"nagging/uncomfortable\" pain that comes and goes. The pain sometimes radiates to his back. He denies nausea, vomiting, fever, or chills. He does not notice anything that makes the pain better or worse. Although he notes it has been getting worse over the last month. The patient specifically came in today because his nurse told him to. He lives in a nursing facility with his wife. He uses a scooter for ambulating. He states he has intentionally lost approximately 80 pounds since he retired just over a year ago. Of note, the patient takes Eliquis 5mg BID for atrial fibrillation, the last dose he took was this morning.      Surgical History: Two back surgeries which included an anterior approach, a CABG, pacemaker placement     Past Medical History:        Diagnosis Date    Arthritis     Back pain     CAD (coronary artery disease)     [facility]     prostate    Diabetes mellitus ([facility]     Hyperlipidemia     Hypertension     Rosacea        Past Surgical History:        Procedure Laterality Date    BACK SURGERY      CATARACT REMOVAL WITH IMPLANT      COLONOSCOPY  9/21/11    COLONOSCOPY  2/11/2015    polyp, diverticulosis    CORONARY ANGIOPLASTY WITH STENT PLACEMENT      CORONARY ARTERY BYPASS GRAFT      EYE SURGERY      JOINT REPLACEMENT      knee    PROSTATE SURGERY      seed implants    SHOULDER SURGERY         Allergies:  Gabapentin and Morphine    Medications:   Home Meds  No  insulin aspart (NOVOLOG) 100 UNIT/ML injection vial Inject 0-10 Units into the skin 2 times daily Takes 4 units for BS up to 150 and then takes 1 additional unit for each 50 unit increments. May take up to 50 units daily.  LORazepam (ATIVAN) 1 MG tablet Take 0.5-1 mg by mouth nightly as needed for Anxiety.  magnesium hydroxide (MILK OF MAGNESIA) 400 MG/5ML suspension Take 30 mLs by mouth daily as needed for Constipation      polyethylene glycol (GLYCOLAX) packet Take 17 g by mouth daily       acetaminophen (TYLENOL) 325 MG tablet Take 650 mg by mouth every 4 hours as needed for Pain          Current Meds  No current facility-administered medications for this encounter. Family History:   Family History   Problem Relation Age of Onset    Cancer Mother     Cancer Sister        Social History:   TOBACCO:   reports that he has never smoked. He has never used smokeless tobacco.  ETOH:   reports no history of alcohol use. DRUGS:   reports no history of drug use. Review of Systems:     Constitutional: +intentional weight loss  HENT: Negative. Eyes: Negative. Respiratory: Negative. Cardiovascular: As above  Gastrointestinal: +RUQ abdominal pain  Genitourinary: Negative. Musculoskeletal: Negative. Skin: Negative. Endocrine: As above  Allergic/Immunologic: Negative. Neurological: Negative. Hematological: Negative. Psychiatric/Behavioral: Negative.     Physical exam:    Vitals:    11/22/20 1117   BP: 123/82   Pulse: 76   Resp: 22   Temp: 97.8 °F (36.6 °C)   TempSrc: Oral   SpO2: 95%       General appearance: alert, no acute distress, grooming appropriate  Eyes: No scleral icterus, EOM grossly intact  Neck: trachea midline, no JVD, no lymphadenopathy, neck supple  Chest/Lungs: CTAB, no crackles/rales, wheezes/rhonchi, normal effort with no accessory muscle use on RA, pacemaker noted, sternotomy scar well healed   Cardiovascular: RRR, no murmurs/gallops/rubs, S1 and S2 noted, well HLD, CAD and A. Fib, CHF with an EF of 20-25% on his last ECHO (7/6/2019), and T2DM (last A1c 6.9) presented to [facility] ED with complaints of right upper quadrant abdominal pain. He is currently afebrile and hemodynamically stable. CT scan done in the ED showed cholelithiasis with gallbladder wall thickening and pericholecystic fluid consistent with acute cholecystitis.  The patient does not have a leukocytosis with WBC at 8.3, total bilirubin is 1.4, AST is 55.      - Recommend IV antibiotics- Zosyn   - NPO with minimal IV fluid given his CHF and low EF.  - Given that this patient is a poor surgical candidate we recommend he get a percutaneous cholecystostomy tube for his cholecystitis  - Admit to medicine  - Medical management per primary team  - Last dose of eliquis was 11:22am, please continue to hold  - General surgery team will continue to follow  - Discussed with Dr. Danie Salazar DO  11/22/20  1:32 PM

## 2020-11-23 LAB
ABO/RH: NORMAL
ALBUMIN SERPL-MCNC: 3.3 G/DL (ref 3.4–5)
ALBUMIN SERPL-MCNC: 3.7 G/DL (ref 3.4–5)
ALP BLD-CCNC: 112 U/L (ref 40–129)
ALT SERPL-CCNC: 235 U/L (ref 10–40)
ANION GAP SERPL CALCULATED.3IONS-SCNC: 22 MMOL/L (ref 3–16)
ANTIBODY SCREEN: NORMAL
AST SERPL-CCNC: 360 U/L (ref 15–37)
BASOPHILS ABSOLUTE: 0.2 K/UL (ref 0–0.2)
BASOPHILS RELATIVE PERCENT: 2.4 %
BILIRUB SERPL-MCNC: 1.9 MG/DL (ref 0–1)
BILIRUBIN DIRECT: 0.5 MG/DL (ref 0–0.3)
BILIRUBIN, INDIRECT: 1.4 MG/DL (ref 0–1)
BLOOD BANK DISPENSE STATUS: NORMAL
BLOOD BANK PRODUCT CODE: NORMAL
BPU ID: NORMAL
BUN BLDV-MCNC: 25 MG/DL (ref 7–20)
CALCIUM SERPL-MCNC: 9.2 MG/DL (ref 8.3–10.6)
CHLORIDE BLD-SCNC: 99 MMOL/L (ref 99–110)
CO2: 12 MMOL/L (ref 21–32)
CREAT SERPL-MCNC: 1 MG/DL (ref 0.8–1.3)
DESCRIPTION BLOOD BANK: NORMAL
EKG ATRIAL RATE: 94 BPM
EKG DIAGNOSIS: NORMAL
EKG Q-T INTERVAL: 412 MS
EKG QRS DURATION: 146 MS
EKG QTC CALCULATION (BAZETT): 486 MS
EKG R AXIS: -12 DEGREES
EKG T AXIS: 78 DEGREES
EKG VENTRICULAR RATE: 84 BPM
EOSINOPHILS ABSOLUTE: 0 K/UL (ref 0–0.6)
EOSINOPHILS RELATIVE PERCENT: 0.2 %
ESTIMATED AVERAGE GLUCOSE: 165.7 MG/DL
GFR AFRICAN AMERICAN: >60
GFR NON-AFRICAN AMERICAN: >60
GLUCOSE BLD-MCNC: 128 MG/DL (ref 70–99)
GLUCOSE BLD-MCNC: 136 MG/DL (ref 70–99)
GLUCOSE BLD-MCNC: 70 MG/DL (ref 70–99)
HBA1C MFR BLD: 7.4 %
HCT VFR BLD CALC: 35.2 % (ref 40.5–52.5)
HEMOGLOBIN: 11.7 G/DL (ref 13.5–17.5)
INR BLD: 2.78 (ref 0.86–1.14)
INR BLD: 2.87 (ref 0.86–1.14)
INR BLD: 2.9 (ref 0.86–1.14)
LACTIC ACID: 4.1 MMOL/L (ref 0.4–2)
LACTIC ACID: 4.9 MMOL/L (ref 0.4–2)
LACTIC ACID: 7.5 MMOL/L (ref 0.4–2)
LYMPHOCYTES ABSOLUTE: 1 K/UL (ref 1–5.1)
LYMPHOCYTES RELATIVE PERCENT: 13.3 %
MAGNESIUM: 1.7 MG/DL (ref 1.8–2.4)
MCH RBC QN AUTO: 32.2 PG (ref 26–34)
MCHC RBC AUTO-ENTMCNC: 33.3 G/DL (ref 31–36)
MCV RBC AUTO: 96.6 FL (ref 80–100)
MONOCYTES ABSOLUTE: 0.6 K/UL (ref 0–1.3)
MONOCYTES RELATIVE PERCENT: 8.4 %
NEUTROPHILS ABSOLUTE: 5.8 K/UL (ref 1.7–7.7)
NEUTROPHILS RELATIVE PERCENT: 75.7 %
PDW BLD-RTO: 18.4 % (ref 12.4–15.4)
PERFORMED ON: ABNORMAL
PERFORMED ON: NORMAL
PHOSPHORUS: 4.8 MG/DL (ref 2.5–4.9)
PLATELET # BLD: 85 K/UL (ref 135–450)
PMV BLD AUTO: 7.7 FL (ref 5–10.5)
POTASSIUM SERPL-SCNC: 5 MMOL/L (ref 3.5–5.1)
PROTHROMBIN TIME: 32.6 SEC (ref 10–13.2)
PROTHROMBIN TIME: 33.6 SEC (ref 10–13.2)
PROTHROMBIN TIME: 34 SEC (ref 10–13.2)
RBC # BLD: 3.64 M/UL (ref 4.2–5.9)
SARS-COV-2, PCR: NOT DETECTED
SODIUM BLD-SCNC: 133 MMOL/L (ref 136–145)
TOTAL PROTEIN: 6.4 G/DL (ref 6.4–8.2)
WBC # BLD: 7.6 K/UL (ref 4–11)

## 2020-11-23 PROCEDURE — 6360000002 HC RX W HCPCS: Performed by: INTERNAL MEDICINE

## 2020-11-23 PROCEDURE — 1200000000 HC SEMI PRIVATE

## 2020-11-23 PROCEDURE — 85610 PROTHROMBIN TIME: CPT

## 2020-11-23 PROCEDURE — 2580000003 HC RX 258: Performed by: INTERNAL MEDICINE

## 2020-11-23 PROCEDURE — 85025 COMPLETE CBC W/AUTO DIFF WBC: CPT

## 2020-11-23 PROCEDURE — 6370000000 HC RX 637 (ALT 250 FOR IP): Performed by: INTERNAL MEDICINE

## 2020-11-23 PROCEDURE — 2580000003 HC RX 258: Performed by: STUDENT IN AN ORGANIZED HEALTH CARE EDUCATION/TRAINING PROGRAM

## 2020-11-23 PROCEDURE — 80069 RENAL FUNCTION PANEL: CPT

## 2020-11-23 PROCEDURE — 83605 ASSAY OF LACTIC ACID: CPT

## 2020-11-23 PROCEDURE — 99233 SBSQ HOSP IP/OBS HIGH 50: CPT | Performed by: SURGERY

## 2020-11-23 PROCEDURE — 85384 FIBRINOGEN ACTIVITY: CPT

## 2020-11-23 PROCEDURE — 86901 BLOOD TYPING SEROLOGIC RH(D): CPT

## 2020-11-23 PROCEDURE — 80076 HEPATIC FUNCTION PANEL: CPT

## 2020-11-23 PROCEDURE — 86850 RBC ANTIBODY SCREEN: CPT

## 2020-11-23 PROCEDURE — P9017 PLASMA 1 DONOR FRZ W/IN 8 HR: HCPCS

## 2020-11-23 PROCEDURE — 6360000002 HC RX W HCPCS: Performed by: STUDENT IN AN ORGANIZED HEALTH CARE EDUCATION/TRAINING PROGRAM

## 2020-11-23 PROCEDURE — 94761 N-INVAS EAR/PLS OXIMETRY MLT: CPT

## 2020-11-23 PROCEDURE — 86900 BLOOD TYPING SEROLOGIC ABO: CPT

## 2020-11-23 PROCEDURE — 36415 COLL VENOUS BLD VENIPUNCTURE: CPT

## 2020-11-23 PROCEDURE — 83735 ASSAY OF MAGNESIUM: CPT

## 2020-11-23 PROCEDURE — 36430 TRANSFUSION BLD/BLD COMPNT: CPT

## 2020-11-23 RX ORDER — SODIUM CHLORIDE 9 MG/ML
1000 INJECTION, SOLUTION INTRAVENOUS CONTINUOUS
Status: ACTIVE | OUTPATIENT
Start: 2020-11-23 | End: 2020-11-23

## 2020-11-23 RX ORDER — GUAIFENESIN 600 MG/1
1200 TABLET, EXTENDED RELEASE ORAL 2 TIMES DAILY PRN
Status: ON HOLD | COMMUNITY
End: 2020-11-30 | Stop reason: HOSPADM

## 2020-11-23 RX ORDER — GUAIFENESIN 600 MG/1
600 TABLET, EXTENDED RELEASE ORAL 2 TIMES DAILY PRN
Status: DISCONTINUED | OUTPATIENT
Start: 2020-11-23 | End: 2020-11-30 | Stop reason: HOSPADM

## 2020-11-23 RX ORDER — VENLAFAXINE HYDROCHLORIDE 37.5 MG/1
37.5 CAPSULE, EXTENDED RELEASE ORAL DAILY
Status: ON HOLD | COMMUNITY
End: 2020-11-30 | Stop reason: HOSPADM

## 2020-11-23 RX ORDER — MAGNESIUM SULFATE IN WATER 40 MG/ML
2 INJECTION, SOLUTION INTRAVENOUS ONCE
Status: COMPLETED | OUTPATIENT
Start: 2020-11-23 | End: 2020-11-23

## 2020-11-23 RX ORDER — SODIUM CHLORIDE, SODIUM LACTATE, POTASSIUM CHLORIDE, AND CALCIUM CHLORIDE .6; .31; .03; .02 G/100ML; G/100ML; G/100ML; G/100ML
500 INJECTION, SOLUTION INTRAVENOUS ONCE
Status: COMPLETED | OUTPATIENT
Start: 2020-11-23 | End: 2020-11-23

## 2020-11-23 RX ORDER — OXYCODONE HYDROCHLORIDE 5 MG/1
5 TABLET ORAL EVERY 6 HOURS PRN
Status: ON HOLD | COMMUNITY
End: 2020-11-30 | Stop reason: SDUPTHER

## 2020-11-23 RX ORDER — 0.9 % SODIUM CHLORIDE 0.9 %
20 INTRAVENOUS SOLUTION INTRAVENOUS ONCE
Status: COMPLETED | OUTPATIENT
Start: 2020-11-23 | End: 2020-11-23

## 2020-11-23 RX ORDER — SODIUM CHLORIDE, SODIUM LACTATE, POTASSIUM CHLORIDE, CALCIUM CHLORIDE 600; 310; 30; 20 MG/100ML; MG/100ML; MG/100ML; MG/100ML
INJECTION, SOLUTION INTRAVENOUS CONTINUOUS
Status: DISCONTINUED | OUTPATIENT
Start: 2020-11-23 | End: 2020-11-23

## 2020-11-23 RX ORDER — LORAZEPAM 1 MG/1
1 TABLET ORAL NIGHTLY PRN
Status: ON HOLD | COMMUNITY
End: 2020-11-30 | Stop reason: SDUPTHER

## 2020-11-23 RX ADMIN — HYDROCORTISONE 15 MG: 5 TABLET ORAL at 22:01

## 2020-11-23 RX ADMIN — Medication 10 ML: at 20:27

## 2020-11-23 RX ADMIN — PHYTONADIONE 5 MG: 10 INJECTION, EMULSION INTRAMUSCULAR; INTRAVENOUS; SUBCUTANEOUS at 19:30

## 2020-11-23 RX ADMIN — MEROPENEM 1 G: 1 INJECTION, POWDER, FOR SOLUTION INTRAVENOUS at 18:37

## 2020-11-23 RX ADMIN — SODIUM CHLORIDE: 9 INJECTION, SOLUTION INTRAVENOUS at 00:40

## 2020-11-23 RX ADMIN — HYDROCORTISONE 30 MG: 20 TABLET ORAL at 10:35

## 2020-11-23 RX ADMIN — SODIUM CHLORIDE, POTASSIUM CHLORIDE, SODIUM LACTATE AND CALCIUM CHLORIDE 500 ML: 600; 310; 30; 20 INJECTION, SOLUTION INTRAVENOUS at 14:57

## 2020-11-23 RX ADMIN — DOCUSATE SODIUM 100 MG: 100 CAPSULE, LIQUID FILLED ORAL at 10:34

## 2020-11-23 RX ADMIN — MUPIROCIN: 20 OINTMENT TOPICAL at 10:35

## 2020-11-23 RX ADMIN — TAMSULOSIN HYDROCHLORIDE 0.4 MG: 0.4 CAPSULE ORAL at 20:33

## 2020-11-23 RX ADMIN — SODIUM CHLORIDE 1000 ML: 9 INJECTION, SOLUTION INTRAVENOUS at 19:37

## 2020-11-23 RX ADMIN — SODIUM CHLORIDE, POTASSIUM CHLORIDE, SODIUM LACTATE AND CALCIUM CHLORIDE: 600; 310; 30; 20 INJECTION, SOLUTION INTRAVENOUS at 14:00

## 2020-11-23 RX ADMIN — SODIUM CHLORIDE 20 ML: 900 INJECTION, SOLUTION INTRAVENOUS at 14:45

## 2020-11-23 RX ADMIN — TRAZODONE HYDROCHLORIDE 200 MG: 100 TABLET ORAL at 20:33

## 2020-11-23 RX ADMIN — CARBIDOPA AND LEVODOPA 1 TABLET: 25; 100 TABLET ORAL at 10:34

## 2020-11-23 RX ADMIN — MAGNESIUM SULFATE HEPTAHYDRATE 2 G: 40 INJECTION, SOLUTION INTRAVENOUS at 14:46

## 2020-11-23 RX ADMIN — DIGOXIN 125 MCG: 125 TABLET ORAL at 10:34

## 2020-11-23 RX ADMIN — Medication 10 ML: at 10:38

## 2020-11-23 RX ADMIN — PIPERACILLIN AND TAZOBACTAM 3.38 G: 3; .375 INJECTION, POWDER, LYOPHILIZED, FOR SOLUTION INTRAVENOUS at 00:55

## 2020-11-23 RX ADMIN — LAMOTRIGINE 25 MG: 25 TABLET ORAL at 11:08

## 2020-11-23 RX ADMIN — PIPERACILLIN AND TAZOBACTAM 3.38 G: 3; .375 INJECTION, POWDER, LYOPHILIZED, FOR SOLUTION INTRAVENOUS at 10:45

## 2020-11-23 RX ADMIN — CARBIDOPA AND LEVODOPA 1 TABLET: 25; 100 TABLET ORAL at 20:33

## 2020-11-23 RX ADMIN — Medication 15 G: at 23:33

## 2020-11-23 RX ADMIN — ATORVASTATIN CALCIUM 20 MG: 20 TABLET, FILM COATED ORAL at 20:33

## 2020-11-23 RX ADMIN — CYANOCOBALAMIN TAB 1000 MCG 1000 MCG: 1000 TAB at 10:34

## 2020-11-23 RX ADMIN — LORAZEPAM 0.5 MG: 0.5 TABLET ORAL at 20:33

## 2020-11-23 RX ADMIN — DOCUSATE SODIUM 100 MG: 100 CAPSULE, LIQUID FILLED ORAL at 22:00

## 2020-11-23 RX ADMIN — LAMOTRIGINE 25 MG: 25 TABLET ORAL at 18:14

## 2020-11-23 RX ADMIN — PHYTONADIONE 5 MG: 10 INJECTION, EMULSION INTRAMUSCULAR; INTRAVENOUS; SUBCUTANEOUS at 10:37

## 2020-11-23 RX ADMIN — METOPROLOL TARTRATE 25 MG: 25 TABLET, FILM COATED ORAL at 13:59

## 2020-11-23 ASSESSMENT — PAIN SCALES - GENERAL
PAINLEVEL_OUTOF10: 0

## 2020-11-23 ASSESSMENT — PAIN SCALES - PAIN ASSESSMENT IN ADVANCED DEMENTIA (PAINAD)
BODYLANGUAGE: 0
FACIALEXPRESSION: 0
TOTALSCORE: 0
CONSOLABILITY: 0
BREATHING: 0
NEGVOCALIZATION: 0

## 2020-11-23 NOTE — PROGRESS NOTES
room who admits patient does have waxing and waning mental status changes    Less tender    Reverse INR for anticipated perc desi tomorrow    [place] [facility]

## 2020-11-23 NOTE — PROGRESS NOTES
Pt arrived to room 6322 in stable condition. He is alert to self and place, but confused to time and situation. Slid from stretcher to bed with assist of 2. Pt denies pain. Slight shortness of breath. O2 saturation 93% on room air. Vital signs stable.

## 2020-11-23 NOTE — H&P
Hospital Medicine      History & Physical        Reason for admission/ Chief Complaint:abd pain    History Obtained From:  patient    HISTORY OF PRESENT ILLNESS:    The patient is a 80 y.o. male who presents history of Parkinson's,CAD,DM2,HTN, CHF and  A. Fib  -presents to the ED with  one week of worsened rt upper abdominal pain  -cramping  With no radiation   -no associated nausea or vomiting.    -has  shortness of breath which has been his baseline for the past 6 month  -pain is intermittent   - his nurse [facility].          ED Course: [address] [place] [address]  Was seen by Surgery who recommended holding his eliquis for Perc desi in am  ??  ED Triage Vitals   Enc Vitals Group      BP 11/22/20 1117 123/82      Pulse 11/22/20 1117 76      Resp 11/22/20 1117 22      Temp 11/22/20 1117 97.8 °F (36.6 °C)      Temp Source 11/22/20 1117 Oral      SpO2 11/22/20 1117 95 %      Weight 11/22/20 1602 220 lb (99.8 kg)      Height 11/22/20 1602 6' 1\" (1.854 m)      Head Circumference --       Peak Flow --       Pain Score --       Pain Loc --       Pain Edu? --       Excl.  in [address]? --      Medications   piperacillin-tazobactam (ZOSYN) 3.375 g in dextrose 5 % 100 mL IVPB extended infusion (mini-bag) (3.375 g Intravenous New Bag 11/22/20 1808)   magnesium hydroxide (MILK OF MAGNESIA) 400 MG/5ML suspension 30 mL (has no administration in time range)   polyethylene glycol (GLYCOLAX) packet 17 g (has no administration in time range)   metoprolol tartrate (LOPRESSOR) tablet 25 mg (has no administration in time range)   bisacodyl (DULCOLAX) suppository 10 mg (has no administration in time range)   carbidopa-levodopa (SINEMET)  MG per tablet 1 tablet (has no administration in time range)   vitamin B-12 (CYANOCOBALAMIN) tablet 1,000 mcg (has no administration in time range)   digoxin (LANOXIN) tablet 125 mcg (has no administration in time range)   docusate sodium (COLACE) capsule 100 mg (has no administration in time range)   DULoxetine (CYMBALTA) extended release capsule 60 mg (has no administration in time range)   guaiFENesin (MUCINEX) extended release tablet 600 mg (has no administration in time range)   hydrocortisone (CORTEF) tablet 15 mg (has no administration in time range)   hydrocortisone (CORTEF) tablet 7.5 mg (has no administration in time range)   linaclotide (LINZESS) capsule 145 mcg (has no administration in time range)   mupirocin (BACTROBAN) 2 % ointment (has no administration in time range)   tamsulosin (FLOMAX) capsule 0.4 mg (has no administration in time range)   traZODone (DESYREL) tablet 200 mg (has no administration in time range)   Insulin Degludec SOPN 44 Units (has no administration in time range)   lamoTRIgine (LAMICTAL) tablet 25 mg (has no administration in time range)   atorvastatin (LIPITOR) tablet 20 mg (has no administration in time range)   sodium chloride flush 0.9 % injection 10 mL (has no administration in time range)   sodium chloride flush 0.9 % injection 10 mL (has no administration in time range)   acetaminophen (TYLENOL) tablet 650 mg (has no administration in time range)     Or   acetaminophen (TYLENOL) suppository 650 mg (has no administration in time range)   polyethylene glycol (GLYCOLAX) packet 17 g (has no administration in time range)   promethazine (PHENERGAN) tablet 12.5 mg (has no administration in time range)     Or   ondansetron (ZOFRAN) injection 4 mg (has no administration in time range)   LORazepam (ATIVAN) tablet 0.5 mg (has no administration in time range)   glucose (GLUTOSE) 40 % oral gel 15 g (has no administration in time range)   dextrose 50 % IV solution (has no administration in time range)   glucagon (rDNA) injection 1 mg (has no administration in time range)   dextrose 5 % solution (has no administration in time range)   insulin lispro (1 Unit Dial) 0-6 Units (has no administration in time range)   insulin lispro (1 Unit Dial) 0-3 Units (has no administration in time range)   iopamidol (ISOVUE-370) 76 % injection 80 mL (80 mLs Intravenous Given 11/22/20 1221)   furosemide (LASIX) injection 40 mg (40 mg Intravenous Given 11/22/20 1511)         Past Medical History:        Diagnosis Date    Arthritis     Back pain     CAD (coronary artery disease)     Cancer (HCC)     prostate    Diabetes mellitus ([address]     Hyperlipidemia     Hypertension     Rosacea        Past Surgical History:        Procedure Laterality Date    BACK SURGERY      CATARACT REMOVAL WITH IMPLANT      COLONOSCOPY  9/21/11    COLONOSCOPY  2/11/2015    polyp, diverticulosis    CORONARY ANGIOPLASTY WITH STENT PLACEMENT      CORONARY ARTERY BYPASS GRAFT      EYE SURGERY      JOINT REPLACEMENT      knee    PROSTATE SURGERY      seed implants    SHOULDER SURGERY         Medications Prior to Admission:    Not in a hospital admission. Allergies:  Gabapentin and Morphine    Social History:   TOBACCO:   reports that he has never smoked. He has never used smokeless tobacco.  ETOH:   reports no history of alcohol use. Patient currently lives in a nursing home    Family History:       Problem Relation Age of Onset    Cancer Mother     Cancer Sister        REVIEW OF SYSTEMS:  10 point ROS obtained, as per HPI, otherwise NEG    PHYSICAL EXAM:  BP (!) 136/96   Pulse 88   Temp 97.8 °F (36.6 °C) (Oral)   Resp (!) 31   Ht 6' 1\" (1.854 m)   Wt 220 lb (99.8 kg)   SpO2 94%   BMI 29.03 kg/m²   General appearance: alert, cooperative and appears in no distress  Head: Normocephalic, without obvious abnormality, atraumatic  Eyes: conjunctivae/corneas clear, EOM's intact.    Neck: no JVD, supple,   Lungs: clear to auscultation bilaterally  Heart: regular rate and rhythm, S1, S2 normal,  Abdomen:soft, NT,ND, BS+  Extremities:Pedal edema non-pitting, no sign of DVT  Skin: Skin color, texture, turgor normal. No rashes or lesions  Neurologic: Alert and oriented X 3, normal strength and tone. Normal symmetric, reflexes. Normal coordination and gait,Babinski absent. Mental status: Alert, oriented, thought content appropriate  Sensory: normal  Motor:grossly normal    DATA:    CBC with Differential:    Lab Results   Component Value Date    WBC 8.3 11/22/2020    RBC 3.78 11/22/2020    HGB 11.9 11/22/2020    HCT 37.2 11/22/2020    PLT 94 11/22/2020    MCV 98.5 11/22/2020    LYMPHOPCT 9.0 11/22/2020     BMP:    Lab Results   Component Value Date     11/22/2020    K 5.0 11/22/2020    CL 96 11/22/2020    CO2 21 11/22/2020    BUN 19 11/22/2020    CREATININE 0.8 11/22/2020    CALCIUM 9.2 11/22/2020    GFRAA >60 11/22/2020    LABGLOM >60 11/22/2020    GLUCOSE 230 11/22/2020           ASSESSMENT / PLAN:    Acute cholecystitis  -IV   Zosyn   - NPO with careful fluids  - percutaneous cholecystostomy tube  Planned given high risk for surgery      DM2 controlled  SSI while NPO  decr dose of home insulin to half    Parkinson's dz  -ct sinemet    Adrenal insuff  -will double home dose of steroids given stress of acute illness    CAD-no chest pain. Hx of  CVA (cerebrovascular accident)   -holding eliquis for now. Atrial fibrillation ([address]  -rate controlled on dig  -dig level       Chronic systolic heart failure    Monitor closely to avoid volume overload  Last echo in 7/2019 with EF 20-25%        Disposition:pending Percut desi    ____________________________   NASEEM Thakkar MD  Hospitalist Service

## 2020-11-23 NOTE — PROGRESS NOTES
4 Eyes Admission Assessment     I agree as the admission nurse that 2 RN's have performed a thorough Head to Toe Skin Assessment on the patient. ALL assessment sites listed below have been assessed on admission. Areas assessed by both nurses: ***  [x]   Head, Face, and Ears   [x]   Shoulders, Back, and Chest  [x]   Arms, Elbows, and Hands   [x]   Coccyx, Sacrum, and Ischium  [x]   Legs, Feet, and Heels        Does the Patient have Skin Breakdown?   Yes a wound was noted on the Admission Assessment and an LDA was Initiated documentation include the Dimple-wound, Wound Assessment, Measurements, Dressing Treatment, Drainage, and Color\",         Bhavik Prevention initiated:  Yes   Wound Care Orders initiated:  No      WOC nurse consulted for Pressure Injury (Stage 3,4, Unstageable, DTI, NWPT, and Complex wounds) or Bhavik score 18 or lower:  No      Nurse 1 eSignature: Electronically signed by Darin Longoria RN on 11/23/20 at 4:48 AM EST    **SHARE this note so that the co-signing nurse is able to place an eSignature**    Nurse 2 eSignature: {Esignature:[id number]}

## 2020-11-23 NOTE — CARE COORDINATION
Case Management Assessment           Initial Evaluation                Date / Time of Evaluation: 11/23/2020 2:54 PM                 Assessment Completed by: Salome Elias    Patient Name: Oneal Jimenez     YOB: 1933  Diagnosis: Acute cholecystitis [K81.0]  Acute cholecystitis [K81.0]     Date / Time: 11/22/2020 11:12 AM    Patient Admission Status: Inpatient    If patient is discharged prior to next notation, then this note serves as note for discharge by case management. Current PCP: Kade Birch Patient: No    Chart Reviewed: Yes-info from chart and previous visit  Patient/ Family Interviewed: No    Initial assessment completed at bedside with: Pt not wanting to discuss now/will place call to wife    Hospitalization in the last 30 days: Yes    Emergency Contacts:  Extended Emergency Contact Information  Primary Emergency Contact: Karen Workman  Address: [address] [place] [address] Phone: [phone]  Mobile Phone: [phone]  Relation: Spouse  Secondary Emergency Contact: Annalee [address] Phone: [phone]  Relation: Child    Advance Directives:   Code Status: [address]: Yes  Agent: Levonne Favors    Contact Number: [phone]    Financial  Payor: MEDICARE / Plan: MEDICARE PART A AND B / Product Type: *No Product type* /     Pre-cert required for SNF: No    Pharmacy    [address] [place] [phone] - F [phone]  [address] [facility] [address]  Phone: [phone] Fax: [phone]      Potential assistance Purchasing Medications:    Does Patient want to participate in local refill/ meds to beds program?:      Meds To Beds General Rules:  1. Can ONLY be done Monday- Friday between 8:30am-5pm  2. Prescription(s) must be in pharmacy by 3pm to be filled same day  3. Copy of patient's insurance/ prescription drug card and patient face sheet must be sent along with the prescription(s)  4. Cost of Rx cannot be added to hospital bill. If financial assistance is needed, please contact unit  or ;  or  CANNOT provide pharmacy voucher for patients co-pays  5. Patients can then  the prescription on their way out of the hospital at discharge, or pharmacy can deliver to the bedside if staff is available. (payment due at time of pick-up or delivery - cash, check, or card accepted)     Able to afford home medications/ co-pay costs: Yes    ADLS  Support Systems:      PT AM-PAC:   /24  OT AM-PAC:   /24    [place]: [facility] with wife  Steps:     Plans to RETURN to current housing: Yes  Barriers to RETURNING to current housing: medical clearance; need PT/OT christ[address]  Currently ACTIVE with [address]: Yes  [address]: [facility]   Phone: [phone]  Fax: [phone]    Currently ACTIVE with [place] on Aging: No  Passport/ Waiver: No  Passport/ Waiver Services: Not Applicable      Durable Medical Equipment  DME Provider:   Equipment:    Home Oxygen and [address] prior to admission: No  [address]: Not Applicable  Other Respiratory Equipment:     Informed of need to bring portable home O2 tank on day of DISCHARGE for nursing to connect prior to leaving: No  Verbalized agreement/Understanding: No  Person to bring portable tank at discharge:     Dialysis  Active with HD/PD prior to admission: No  Nephrologist:     HD Center:  Not Applicable    DISCHARGE PLAN:  Disposition: Home with Mauri Shaw for discharge: family     Factors facilitating achievement of predicted outcomes: Family support and Caregiver support    Barriers to discharge:      Additional Case Management Notes:   Pt is from [facility] with his wife. Pt also has private duty aides from [address] [place] [address] Avenue to [address] Pt may need perc desi. Pt will need PT/OT evals after procedure to assist with appropriate DC plan and needs. SW will follow. The Plan for Transition of Care is related to the following treatment goals of Acute cholecystitis [K81.0]  Acute cholecystitis [K81.0]    The Patient and/or patient representative Zahra Stout and his family were provided with a choice of provider and agrees with the discharge plan Yes    Freedom of choice list was provided with basic dialogue that supports the patient's individualized plan of care/goals and shares the quality data associated with the providers.  Yes    Care Transition patient: No    Sena Farr [address]   Case Management Department  Ph: [phone]

## 2020-11-23 NOTE — DISCHARGE INSTR - COC
Continuity of Care Form    Patient Name: Job Dear   :  1933  MRN:  [id number]    Admit date:  2020  Discharge date:  ***    Code Status Order: Full Code   Advance Directives:   Advance Care Flowsheet Documentation       Date/Time Healthcare Directive Type of Healthcare Directive Copy in [address] Agent's Name Healthcare Agent's Phone Number    [phone]  Yes, patient has an advance directive for healthcare treatment  Durable power of  for health care wife, Danie Galicia  Yes, copy in chart  --  --  --            Admitting Physician:  Guero Rich MD  PCP: [address]    Discharging Nurse: [facility] Unit/Room#: 4641/2433-72  Discharging Unit Phone Number: ***    Emergency Contact:   Extended Emergency Contact Information  Primary Emergency Contact: Karen Workman  Address: [address] [place] [address] Phone: [phone]  Mobile Phone: [phone]  Relation: Spouse  Secondary Emergency Contact: Damián Perkins [address] Phone: [phone]  Relation: Child    Past Surgical History:  Past Surgical History:   Procedure Laterality Date    BACK SURGERY      CATARACT REMOVAL WITH IMPLANT      COLONOSCOPY  11    COLONOSCOPY  2015    polyp, diverticulosis    CORONARY ANGIOPLASTY WITH STENT PLACEMENT      CORONARY ARTERY BYPASS GRAFT      EYE SURGERY      JOINT REPLACEMENT      knee    PROSTATE SURGERY      seed implants    SHOULDER SURGERY         Immunization History:   Immunization History   Administered Date(s) Administered    Tdap (Boostrix, Adacel) 2020       Active Problems:  Patient Active Problem List   Diagnosis Code    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 T86.046    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 T84.89XA    Acute CVA (cerebrovascular accident) ([address] I63.9    Hyponatremia E87.1    Atrial fibrillation ([address] I48.91    Chronic systolic heart failure (HCC) I50.22    Acute cholecystitis K81.0    Acute on chronic congestive heart failure (HCC) I50.9       Isolation/Infection:   Isolation            Droplet          Patient Infection Status       Infection Onset Added Last Indicated Last Indicated By Review Planned Expiration Resolved Resolved By    None active    Resolved    COVID-19 Rule Out 11/22/20 11/22/20 11/22/20 COVID-19 (Ordered)   11/23/20 Meenu Berrios RN    COVID-19 Rule Out 10/17/20 10/17/20 10/17/20 COVID-19 (Ordered)   10/18/20 Rule-Out Test Resulted            Nurse Assessment:  Last Vital Signs: BP (!) 151/91   Pulse 122   Temp 97.6 °F (36.4 °C) (Oral)   Resp 20   Ht 6' 1\" (1.854 m)   Wt 223 lb 8.7 oz (101.4 kg)   SpO2 93%   BMI 29.49 kg/m²     Last documented pain score (0-10 scale): Pain Level: 0  Last Weight:   Wt Readings from Last 1 Encounters:   11/22/20 223 lb 8.7 oz (101.4 kg)     Mental Status:  {IP PT MENTAL STATUS:20030:::0}    IV Access:  { NORMA IV ACCESS:030381234:::0}    Nursing Mobility/ADLs:  Walking   {CHP DME ADLs:459801917:::0}  Transfer  {CHP DME ADLs:629492479:::0}  Bathing  {CHP DME ADLs:977835233:::0}  Dressing  {CHP DME ADLs:768564400:::0}  Toileting  {CHP DME ADLs:980273550:::0}  Feeding  {CHP DME ADLs:304395268:::0}  Med Admin  {P DME ADLs:761931481:::0}  Med Delivery   { NORMA MED Delivery:764557256:::0}    Wound Care Documentation and Therapy:  Wound 11/22/20 Knee Anterior;Right red rash (Active)   Number of days: 0       Wound 11/22/20 Sacrum blanchable redness (Active)   Dressing/Treatment Zinc paste 11/23/20 0828   Dimple-wound Assessment Blanchable erythema 11/23/20 0828   Number of days: 0        Elimination:  Continence:    Bowel: {YES / HK:88899}  Bladder: {YES / XF:85864}  Urinary Catheter: {Urinary Catheter:298539185:::0}   Colostomy/Ileostomy/Ileal Conduit: {YES / LA:35717}       Date of Last BM: ***    Intake/Output Summary (Last 24 hours) at 11/23/2020 0802  Last data filed at 2020 0455  Gross per 24 hour   Intake 100 ml   Output 600 ml   Net -500 ml     I/O last 3 completed shifts:   In: 100 [IV Piggyback:100]  Out: 0 [Urine:600]    Safety Concerns:     [address] Safety Concerns:332471680:::0}    Impairments/Disabilities:      [address] Impairments/Disabilities:716679769:::0}    Nutrition Therapy:  Current Nutrition Therapy:   [address] NORMA Diet List:916484564:::0}    Routes of Feeding: {[facility] DME Other Feedings:621832047:::0}  Liquids: {Slp liquid thickness:41366}  Daily Fluid Restriction: {CHP DME Yes amt example:205493646:::0}  Last Modified Barium Swallow with Video (Video Swallowing Test): {Done Not Done OZCB:817548156:::0}    Treatments at the Time of Hospital Discharge:   Respiratory Treatments: ***  Oxygen Therapy:  {Therapy; copd oxygen:36958:::0}  Ventilator:    {[facility] Vent List:660030082:::0}    Rehab Therapies: {THERAPEUTIC INTERVENTION:7081930764}  Weight Bearing Status/Restrictions: [address]  Weight Bearin:::0}  Other Medical Equipment (for information only, NOT a DME order):  {EQUIPMENT:148701568}  Other Treatments: ***    Patient's personal belongings (please select all that are sent with patient):  {[facility] DME Belongings:055225323:::0}    RN SIGNATURE:  {Esignature:777770820:::0}    CASE MANAGEMENT/SOCIAL WORK SECTION    Inpatient Status Date: ***    Readmission Risk Assessment Score:  Readmission Risk              Risk of Unplanned Readmission:        34           Discharging to Facility/ Agency   Name:   Address:  Phone:  Fax:    Dialysis Facility (if applicable)   Name:  Address:  Dialysis Schedule:  Phone:  Fax:    / signature: {Esignature:908765197:::0}    PHYSICIAN SECTION    Prognosis: Poor    Condition at Discharge: Terminal    Rehab Potential (if transferring to Rehab): N/A    Recommended Labs or Other Treatments After Discharge:   - End of life care    Physician Certification: I certify the above information and transfer of Whitney Iniguez Collette Seal  is necessary for the continuing treatment of the diagnosis listed and that he requires Hospice for less than 30 days.      Update Admission H&P: No change in H&P    PHYSICIAN SIGNATURE:  Electronically signed by Arina Nguyen MD on 11/30/20 at 2:43 PM EST

## 2020-11-23 NOTE — PROGRESS NOTES
Hospitalist Progress Note      PCP: Elinor THACKER    Date of Admission: 11/22/2020    Chief Complaint: abd pain     Hospital Course:     Subjective:     Still having ongoin abdominal pain. uptrending lactate. No cp, sob.  No n/v NPO currently       Medications:  Reviewed    Infusion Medications    lactated ringers      dextrose       Scheduled Medications    sodium chloride  20 mL Intravenous Once    metoprolol tartrate  25 mg Oral Daily    insulin glargine  10 Units Subcutaneous Nightly    magnesium sulfate  2 g Intravenous Once    lactated ringers bolus  500 mL Intravenous Once    piperacillin-tazobactam  3.375 g Intravenous Q8H    [Held by provider] polyethylene glycol  17 g Oral Daily    carbidopa-levodopa  1 tablet Oral TID    cyanocobalamin  1,000 mcg Oral Daily    digoxin  125 mcg Oral Daily    docusate sodium  100 mg Oral BID    linaclotide  145 mcg Oral QAM AC    mupirocin   Topical Daily    tamsulosin  0.4 mg Oral Nightly    traZODone  200 mg Oral Nightly    lamoTRIgine  25 mg Oral BID    atorvastatin  20 mg Oral Nightly    sodium chloride flush  10 mL Intravenous 2 times per day    insulin lispro  0-6 Units Subcutaneous TID WC    insulin lispro  0-3 Units Subcutaneous Nightly    hydrocortisone  30 mg Oral Daily    hydrocortisone  15 mg Oral Nightly     PRN Meds: guaiFENesin, magnesium hydroxide, bisacodyl, sodium chloride flush, acetaminophen **OR** acetaminophen, polyethylene glycol, promethazine **OR** ondansetron, LORazepam, glucose, dextrose, glucagon (rDNA), dextrose      Intake/Output Summary (Last 24 hours) at 11/23/2020 1358  Last data filed at 11/23/2020 1146  Gross per 24 hour   Intake 160 ml   Output 850 ml   Net -690 ml       Physical Exam Performed:    BP (!) 121/90   Pulse 115   Temp 98.1 °F (36.7 °C) (Oral)   Resp 22   Ht 6' 1\" (1.854 m)   Wt 223 lb 8.7 oz (101.4 kg)   SpO2 98%   BMI 29.49 kg/m²     General appearance: No apparent distress, appears stated age and cooperative. Respiratory:  Normal respiratory effort. Clear to auscultation, bilaterally without Rales/Wheezes/Rhonchi. Cardiovascular: Regular rate and rhythm with normal S1/S2 without murmurs, rubs or gallops. Abdomen: Soft, moderate RUQ tenderness, non-distended with normal bowel sounds. Musculoskeletal: No clubbing, cyanosis or edema bilaterally. Full range of motion without deformity. Skin: Skin color, texture, turgor normal.  No rashes or lesions. Neurologic:  Neurovascularly intact without any focal sensory/motor deficits. Cranial nerves: II-XII intact, grossly non-focal.  Psychiatric: Alert and oriented, thought content appropriate, normal insight  Capillary Refill: Brisk,< 3 seconds   Peripheral Pulses: +2 palpable, equal bilaterally       Labs:   Recent Labs     11/22/20  1131 11/23/20  0534   WBC 8.3 7.6   HGB 11.9* 11.7*   HCT 37.2* 35.2*   PLT 94* 85*     Recent Labs     11/22/20  1131 11/23/20  0534   * 133*   K 5.0 5.0   CL 96* 99   CO2 21 12*   BUN 19 25*   CREATININE 0.8 1.0   CALCIUM 9.2 9.2   PHOS  --  4.8     Recent Labs     11/22/20  1131 11/23/20  0534   AST 55* 360*   ALT 20 235*   BILIDIR 0.4* 0.5*   BILITOT 1.4* 1.9*   ALKPHOS 117 112     Recent Labs     11/23/20  0534 11/23/20  1206   INR 2.87* 2.78*     Recent Labs     11/22/20  1131   TROPONINI 0.03*       Urinalysis:      Lab Results   Component Value Date    NITRU Negative 11/22/2020    WBCUA 0-2 11/22/2020    BACTERIA Rare 11/22/2020    RBCUA None seen 11/22/2020    BLOODU Negative 11/22/2020    SPECGRAV 1.020 11/22/2020    GLUCOSEU Negative 11/22/2020       Radiology:  CT ABDOMEN PELVIS W IV CONTRAST Additional Contrast? None   Final Result      Cholelithiasis with gallbladder wall thickening and pericholecystic fluid consistent with acute cholecystitis. Please correlate clinically. Small amount of free ascitic fluid.       Moderate bilateral effusions with compressive atelectasis and cardiac enlargement as well as evidence of right heart failure. Small kidneys bilaterally with simple cyst in the left kidney. Severe fatty infiltration of the liver. Radiation seeds in the prostate            CT PERC CHOLECYSTOSTOMY    (Results Pending)           Assessment/Plan:    Active Hospital Problems    Diagnosis    Acute cholecystitis [K81.0]       Acute cholecystitis - NPO currently, continue with IVF switched to LR. Continue empiric zosyn. Hold anticoagulation. Appreciate surgery recs. Consideration for percutaneous cholecystostomy tube vs cholecystectomy     Anion gap acidosis - lactic acid noted to be uptrending, continue to trend. Small fluid bolus.  LFTs uptrending as well.     parkinsons - stable, continue home medications     Chronic adrenal insufficiency - continue with home steroids     dm2- Continue sliding scale insulin     afib - hold anticoagulation, continue digoxin       DVT Prophylaxis: holding   Diet: Diet NPO Effective Now  Code Status: Full Code    PT/OT Eval Status: not yet cnosulted     Dispo - pending further surgery recs     Mikala Mcconnell MD

## 2020-11-23 NOTE — PLAN OF CARE
Problem: Skin Integrity:  Goal: Will show no infection signs and symptoms  Description: Will show no infection signs and symptoms  Outcome: Ongoing  Note: Pt has blanchable redness on his sacrum, bilateral buttocks, and perianal regions. Rash also found on right knee. No open skin wounds noted on assessment; no signs/symptoms of skin infection. Problem: Falls - Risk of:  Goal: Will remain free from falls  Description: Will remain free from falls  Outcome: Ongoing  Note: Pt is confused, and at high risk for falling. Frequent reorientation as well as hourly visual checks performed on patient throughout the night. Pt has to urinate frequently and states he needs to stand to pee. However, he is unable to stand without max assist of 2 and gait belt and walker. And even with this, it is difficult--his legs are extremely weak. Placed condom catheter and explained and re-explained it to patient repeatedly this shift. Patient removed it once in his confusion, but once replaced, he was not able to remove it again. He has now demonstrated ability to urinate in it. Bed alarm activated; fall sign posted; yellow bracelet and nonskid footwear on; call light explained to patient and kept at bedside. Door closed for droplet isolation. Attempted to get [employer] camera to put in room, but none found on this unit. Charge nurse and house supervisor notified, but no camera in this hospital was available. Will continue to monitor closely.

## 2020-11-24 LAB
ALBUMIN SERPL-MCNC: 3.7 G/DL (ref 3.4–5)
ALBUMIN SERPL-MCNC: 3.9 G/DL (ref 3.4–5)
ALP BLD-CCNC: 114 U/L (ref 40–129)
ALT SERPL-CCNC: 461 U/L (ref 10–40)
AMMONIA: 18 UMOL/L (ref 16–60)
ANION GAP SERPL CALCULATED.3IONS-SCNC: 19 MMOL/L (ref 3–16)
ANION GAP SERPL CALCULATED.3IONS-SCNC: 21 MMOL/L (ref 3–16)
ANION GAP SERPL CALCULATED.3IONS-SCNC: 22 MMOL/L (ref 3–16)
AST SERPL-CCNC: 2958 U/L (ref 15–37)
BASOPHILS ABSOLUTE: 0 K/UL (ref 0–0.2)
BASOPHILS ABSOLUTE: 0 K/UL (ref 0–0.2)
BASOPHILS RELATIVE PERCENT: 0.1 %
BASOPHILS RELATIVE PERCENT: 0.1 %
BILIRUB SERPL-MCNC: 3.6 MG/DL (ref 0–1)
BILIRUBIN DIRECT: 1.9 MG/DL (ref 0–0.3)
BILIRUBIN, INDIRECT: 1.7 MG/DL (ref 0–1)
BLOOD BANK DISPENSE STATUS: NORMAL
BLOOD BANK PRODUCT CODE: NORMAL
BPU ID: NORMAL
BUN BLDV-MCNC: 36 MG/DL (ref 7–20)
BUN BLDV-MCNC: 36 MG/DL (ref 7–20)
BUN BLDV-MCNC: 40 MG/DL (ref 7–20)
CALCIUM SERPL-MCNC: 9.4 MG/DL (ref 8.3–10.6)
CALCIUM SERPL-MCNC: 9.5 MG/DL (ref 8.3–10.6)
CALCIUM SERPL-MCNC: 9.6 MG/DL (ref 8.3–10.6)
CHLORIDE BLD-SCNC: 96 MMOL/L (ref 99–110)
CHLORIDE BLD-SCNC: 96 MMOL/L (ref 99–110)
CHLORIDE BLD-SCNC: 97 MMOL/L (ref 99–110)
CO2: 17 MMOL/L (ref 21–32)
CO2: 18 MMOL/L (ref 21–32)
CO2: 19 MMOL/L (ref 21–32)
CREAT SERPL-MCNC: 1.2 MG/DL (ref 0.8–1.3)
CREAT SERPL-MCNC: 1.2 MG/DL (ref 0.8–1.3)
CREAT SERPL-MCNC: 1.3 MG/DL (ref 0.8–1.3)
DESCRIPTION BLOOD BANK: NORMAL
EOSINOPHILS ABSOLUTE: 0 K/UL (ref 0–0.6)
EOSINOPHILS ABSOLUTE: 0 K/UL (ref 0–0.6)
EOSINOPHILS RELATIVE PERCENT: 0 %
EOSINOPHILS RELATIVE PERCENT: 0.1 %
FIBRINOGEN: 241 MG/DL (ref 200–397)
FIBRINOGEN: 266 MG/DL (ref 200–397)
GFR AFRICAN AMERICAN: >60
GFR NON-AFRICAN AMERICAN: 52
GFR NON-AFRICAN AMERICAN: 57
GFR NON-AFRICAN AMERICAN: 57
GLUCOSE BLD-MCNC: 118 MG/DL (ref 70–99)
GLUCOSE BLD-MCNC: 69 MG/DL (ref 70–99)
GLUCOSE BLD-MCNC: 70 MG/DL (ref 70–99)
GLUCOSE BLD-MCNC: 71 MG/DL (ref 70–99)
GLUCOSE BLD-MCNC: 73 MG/DL (ref 70–99)
GLUCOSE BLD-MCNC: 80 MG/DL (ref 70–99)
GLUCOSE BLD-MCNC: 83 MG/DL (ref 70–99)
GLUCOSE BLD-MCNC: 85 MG/DL (ref 70–99)
GLUCOSE BLD-MCNC: 86 MG/DL (ref 70–99)
HCT VFR BLD CALC: 34.1 % (ref 40.5–52.5)
HCT VFR BLD CALC: 36.4 % (ref 40.5–52.5)
HEMOGLOBIN: 11.2 G/DL (ref 13.5–17.5)
HEMOGLOBIN: 11.7 G/DL (ref 13.5–17.5)
INR BLD: 3.14 (ref 0.86–1.14)
INR BLD: 3.28 (ref 0.86–1.14)
INR BLD: 3.3 (ref 0.86–1.14)
INR BLD: 3.47 (ref 0.86–1.14)
LACTIC ACID: 8.5 MMOL/L (ref 0.4–2)
LACTIC ACID: 9 MMOL/L (ref 0.4–2)
LYMPHOCYTES ABSOLUTE: 0.7 K/UL (ref 1–5.1)
LYMPHOCYTES ABSOLUTE: 0.8 K/UL (ref 1–5.1)
LYMPHOCYTES RELATIVE PERCENT: 5.4 %
LYMPHOCYTES RELATIVE PERCENT: 6 %
MAGNESIUM: 2 MG/DL (ref 1.8–2.4)
MCH RBC QN AUTO: 31.6 PG (ref 26–34)
MCH RBC QN AUTO: 32.2 PG (ref 26–34)
MCHC RBC AUTO-ENTMCNC: 32 G/DL (ref 31–36)
MCHC RBC AUTO-ENTMCNC: 33 G/DL (ref 31–36)
MCV RBC AUTO: 97.7 FL (ref 80–100)
MCV RBC AUTO: 98.6 FL (ref 80–100)
MONOCYTES ABSOLUTE: 1.1 K/UL (ref 0–1.3)
MONOCYTES ABSOLUTE: 1.4 K/UL (ref 0–1.3)
MONOCYTES RELATIVE PERCENT: 9.2 %
MONOCYTES RELATIVE PERCENT: 9.7 %
NEUTROPHILS ABSOLUTE: 10.5 K/UL (ref 1.7–7.7)
NEUTROPHILS ABSOLUTE: 11.8 K/UL (ref 1.7–7.7)
NEUTROPHILS RELATIVE PERCENT: 84.2 %
NEUTROPHILS RELATIVE PERCENT: 85.2 %
PDW BLD-RTO: 19.3 % (ref 12.4–15.4)
PDW BLD-RTO: 19.7 % (ref 12.4–15.4)
PERFORMED ON: ABNORMAL
PERFORMED ON: NORMAL
PHOSPHORUS: 4.8 MG/DL (ref 2.5–4.9)
PHOSPHORUS: 5 MG/DL (ref 2.5–4.9)
PLATELET # BLD: 70 K/UL (ref 135–450)
PLATELET # BLD: 80 K/UL (ref 135–450)
PLATELET # BLD: 84 K/UL (ref 135–450)
PMV BLD AUTO: 8.1 FL (ref 5–10.5)
PMV BLD AUTO: 8.7 FL (ref 5–10.5)
POTASSIUM REFLEX MAGNESIUM: 5 MMOL/L (ref 3.5–5.1)
POTASSIUM SERPL-SCNC: 4.7 MMOL/L (ref 3.5–5.1)
POTASSIUM SERPL-SCNC: 5.2 MMOL/L (ref 3.5–5.1)
PROTHROMBIN TIME: 36.9 SEC (ref 10–13.2)
PROTHROMBIN TIME: 38.5 SEC (ref 10–13.2)
PROTHROMBIN TIME: 38.8 SEC (ref 10–13.2)
PROTHROMBIN TIME: 40.8 SEC (ref 10–13.2)
RBC # BLD: 3.49 M/UL (ref 4.2–5.9)
RBC # BLD: 3.69 M/UL (ref 4.2–5.9)
SODIUM BLD-SCNC: 134 MMOL/L (ref 136–145)
SODIUM BLD-SCNC: 135 MMOL/L (ref 136–145)
SODIUM BLD-SCNC: 136 MMOL/L (ref 136–145)
TOTAL PROTEIN: 6.3 G/DL (ref 6.4–8.2)
WBC # BLD: 12.3 K/UL (ref 4–11)
WBC # BLD: 14 K/UL (ref 4–11)

## 2020-11-24 PROCEDURE — 99221 1ST HOSP IP/OBS SF/LOW 40: CPT | Performed by: NURSE PRACTITIONER

## 2020-11-24 PROCEDURE — 1200000000 HC SEMI PRIVATE

## 2020-11-24 PROCEDURE — 2580000003 HC RX 258: Performed by: INTERNAL MEDICINE

## 2020-11-24 PROCEDURE — 99222 1ST HOSP IP/OBS MODERATE 55: CPT | Performed by: INTERNAL MEDICINE

## 2020-11-24 PROCEDURE — 6370000000 HC RX 637 (ALT 250 FOR IP): Performed by: INTERNAL MEDICINE

## 2020-11-24 PROCEDURE — 2580000003 HC RX 258: Performed by: STUDENT IN AN ORGANIZED HEALTH CARE EDUCATION/TRAINING PROGRAM

## 2020-11-24 PROCEDURE — 85049 AUTOMATED PLATELET COUNT: CPT

## 2020-11-24 PROCEDURE — 36415 COLL VENOUS BLD VENIPUNCTURE: CPT

## 2020-11-24 PROCEDURE — 6360000002 HC RX W HCPCS: Performed by: INTERNAL MEDICINE

## 2020-11-24 PROCEDURE — 82140 ASSAY OF AMMONIA: CPT

## 2020-11-24 PROCEDURE — 85384 FIBRINOGEN ACTIVITY: CPT

## 2020-11-24 PROCEDURE — P9017 PLASMA 1 DONOR FRZ W/IN 8 HR: HCPCS

## 2020-11-24 PROCEDURE — 85610 PROTHROMBIN TIME: CPT

## 2020-11-24 PROCEDURE — 85025 COMPLETE CBC W/AUTO DIFF WBC: CPT

## 2020-11-24 PROCEDURE — 80076 HEPATIC FUNCTION PANEL: CPT

## 2020-11-24 PROCEDURE — 6360000002 HC RX W HCPCS: Performed by: STUDENT IN AN ORGANIZED HEALTH CARE EDUCATION/TRAINING PROGRAM

## 2020-11-24 PROCEDURE — 36430 TRANSFUSION BLD/BLD COMPNT: CPT

## 2020-11-24 PROCEDURE — 80069 RENAL FUNCTION PANEL: CPT

## 2020-11-24 PROCEDURE — 99233 SBSQ HOSP IP/OBS HIGH 50: CPT | Performed by: SURGERY

## 2020-11-24 PROCEDURE — 83605 ASSAY OF LACTIC ACID: CPT

## 2020-11-24 PROCEDURE — 83735 ASSAY OF MAGNESIUM: CPT

## 2020-11-24 RX ORDER — 0.9 % SODIUM CHLORIDE 0.9 %
250 INTRAVENOUS SOLUTION INTRAVENOUS ONCE
Status: DISCONTINUED | OUTPATIENT
Start: 2020-11-24 | End: 2020-11-26

## 2020-11-24 RX ORDER — SODIUM CHLORIDE 9 MG/ML
INJECTION, SOLUTION INTRAVENOUS CONTINUOUS
Status: DISCONTINUED | OUTPATIENT
Start: 2020-11-24 | End: 2020-11-24

## 2020-11-24 RX ORDER — 0.9 % SODIUM CHLORIDE 0.9 %
20 INTRAVENOUS SOLUTION INTRAVENOUS ONCE
Status: COMPLETED | OUTPATIENT
Start: 2020-11-24 | End: 2020-11-24

## 2020-11-24 RX ORDER — DEXTROSE AND SODIUM CHLORIDE 5; .45 G/100ML; G/100ML
INJECTION, SOLUTION INTRAVENOUS CONTINUOUS
Status: DISCONTINUED | OUTPATIENT
Start: 2020-11-24 | End: 2020-11-25

## 2020-11-24 RX ORDER — 0.9 % SODIUM CHLORIDE 0.9 %
250 INTRAVENOUS SOLUTION INTRAVENOUS ONCE
Status: COMPLETED | OUTPATIENT
Start: 2020-11-24 | End: 2020-11-24

## 2020-11-24 RX ADMIN — CARBIDOPA AND LEVODOPA 1 TABLET: 25; 100 TABLET ORAL at 13:11

## 2020-11-24 RX ADMIN — PHYTONADIONE 5 MG: 10 INJECTION, EMULSION INTRAMUSCULAR; INTRAVENOUS; SUBCUTANEOUS at 13:09

## 2020-11-24 RX ADMIN — LAMOTRIGINE 25 MG: 25 TABLET ORAL at 09:36

## 2020-11-24 RX ADMIN — HYDROCORTISONE 30 MG: 20 TABLET ORAL at 09:31

## 2020-11-24 RX ADMIN — CARBIDOPA AND LEVODOPA 1 TABLET: 25; 100 TABLET ORAL at 09:30

## 2020-11-24 RX ADMIN — DOCUSATE SODIUM 100 MG: 100 CAPSULE, LIQUID FILLED ORAL at 22:18

## 2020-11-24 RX ADMIN — METOPROLOL TARTRATE 25 MG: 25 TABLET, FILM COATED ORAL at 22:19

## 2020-11-24 RX ADMIN — ATORVASTATIN CALCIUM 20 MG: 20 TABLET, FILM COATED ORAL at 22:20

## 2020-11-24 RX ADMIN — TRAZODONE HYDROCHLORIDE 200 MG: 100 TABLET ORAL at 22:19

## 2020-11-24 RX ADMIN — HYDROCORTISONE 30 MG: 20 TABLET ORAL at 22:40

## 2020-11-24 RX ADMIN — METOPROLOL TARTRATE 25 MG: 25 TABLET, FILM COATED ORAL at 09:30

## 2020-11-24 RX ADMIN — SODIUM CHLORIDE: 9 INJECTION, SOLUTION INTRAVENOUS at 09:32

## 2020-11-24 RX ADMIN — MEROPENEM 1 G: 1 INJECTION, POWDER, FOR SOLUTION INTRAVENOUS at 05:12

## 2020-11-24 RX ADMIN — CYANOCOBALAMIN TAB 1000 MCG 1000 MCG: 1000 TAB at 09:30

## 2020-11-24 RX ADMIN — DIGOXIN 125 MCG: 125 TABLET ORAL at 09:31

## 2020-11-24 RX ADMIN — DEXTROSE AND SODIUM CHLORIDE: 5; 450 INJECTION, SOLUTION INTRAVENOUS at 17:41

## 2020-11-24 RX ADMIN — SODIUM CHLORIDE 20 ML: 900 INJECTION, SOLUTION INTRAVENOUS at 04:44

## 2020-11-24 RX ADMIN — CARBIDOPA AND LEVODOPA 1 TABLET: 25; 100 TABLET ORAL at 22:19

## 2020-11-24 RX ADMIN — LAMOTRIGINE 25 MG: 25 TABLET ORAL at 17:03

## 2020-11-24 RX ADMIN — DOCUSATE SODIUM 100 MG: 100 CAPSULE, LIQUID FILLED ORAL at 09:30

## 2020-11-24 RX ADMIN — TAMSULOSIN HYDROCHLORIDE 0.4 MG: 0.4 CAPSULE ORAL at 22:19

## 2020-11-24 RX ADMIN — SODIUM CHLORIDE 250 ML: 9 INJECTION, SOLUTION INTRAVENOUS at 13:09

## 2020-11-24 RX ADMIN — MUPIROCIN: 20 OINTMENT TOPICAL at 09:30

## 2020-11-24 RX ADMIN — MEROPENEM 1 G: 1 INJECTION, POWDER, FOR SOLUTION INTRAVENOUS at 22:18

## 2020-11-24 RX ADMIN — MEROPENEM 1 G: 1 INJECTION, POWDER, FOR SOLUTION INTRAVENOUS at 15:01

## 2020-11-24 ASSESSMENT — PAIN SCALES - GENERAL
PAINLEVEL_OUTOF10: 0

## 2020-11-24 ASSESSMENT — ENCOUNTER SYMPTOMS
RESPIRATORY NEGATIVE: 1
ABDOMINAL DISTENTION: 1

## 2020-11-24 NOTE — CONSULTS
Hypertension     Rosacea        Past Surgical History:        Procedure Laterality Date    BACK SURGERY      CATARACT REMOVAL WITH IMPLANT      COLONOSCOPY  9/21/11    COLONOSCOPY  2/11/2015    polyp, diverticulosis    CORONARY ANGIOPLASTY WITH STENT PLACEMENT      CORONARY ARTERY BYPASS GRAFT      EYE SURGERY      JOINT REPLACEMENT      knee    PROSTATE SURGERY      seed implants    SHOULDER SURGERY         Current Medications:    Medications Prior to Admission: venlafaxine (EFFEXOR XR) 37.5 MG extended release capsule, Take 37.5 mg by mouth daily  LORazepam (ATIVAN) 1 MG tablet, Take 1 mg by mouth nightly as needed for Anxiety (0.5-1 mg at bedtime as needed). oxyCODONE (ROXICODONE) 5 MG immediate release tablet, Take 5 mg by mouth every 6 hours as needed for Pain.   Insulin Aspart (NOVOLOG FLEXPEN SC), Inject 10 Units into the skin Tid with meals   (in addition to sliding scale)  vitamin D 25 MCG (1000 UT) CAPS, Take 1,000 Units by mouth daily  guaiFENesin (MUCINEX) 600 MG extended release tablet, Take 1,200 mg by mouth 2 times daily as needed for Congestion  lamoTRIgine (LAMICTAL) 25 MG tablet, Take 1 tablet by mouth 2 times daily (Patient taking differently: Take 25 mg by mouth 2 times daily 0700 and 1600)  atorvastatin (LIPITOR) 20 MG tablet, Take 1 tablet by mouth nightly  bisacodyl (DULCOLAX) 10 MG suppository, Place 10 mg rectally daily as needed for Constipation  carbidopa-levodopa (SINEMET)  MG per tablet, Take 1 tablet by mouth 3 times daily  cyanocobalamin 1000 MCG tablet, Take 1,000 mcg by mouth daily  digoxin (LANOXIN) 125 MCG tablet, Take 125 mcg by mouth daily At his facility, they hold it if HR < 60.  docusate sodium (COLACE) 100 MG capsule, Take 100 mg by mouth 2 times daily  apixaban (ELIQUIS) 5 MG TABS tablet, Take 5 mg by mouth 2 times daily  hydrocortisone (CORTEF) 5 MG tablet, Take 15 mg by mouth daily 15 mg at 0700  hydrocortisone (CORTEF) 5 MG tablet, Take 7.5 mg by mouth Exam  Constitutional:       General: He is not in acute distress. Cardiovascular:      Rate and Rhythm: Normal rate. Heart sounds: Normal heart sounds. Pulmonary:      Effort: Pulmonary effort is normal.      Breath sounds: Normal breath sounds. Abdominal:      Palpations: Abdomen is soft. Musculoskeletal:      Right lower leg: No edema. Left lower leg: No edema. Skin:     General: Skin is warm and dry. Neurological:      Mental Status: He is alert and oriented to person, place, and time. Psychiatric:         Mood and Affect: Mood normal.          Palliative Performance Scale:  [] 60% Ambulation reduced; Significant disease; Can't do hobbies/housework; intake normal or reduced; occasional assist; LOC full/confusion  [x] 50% Mainly sit/lie; Extensive disease; Can't do any work; Considerable assist; intake normal  Or reduced; LOC full/confusion  [] 40% Mainly in bed; Extensive disease; Mainly assist; intake normal or reduced; occasional assist; LOC full/confusion  [] 30% Bed Bound; Extensive disease; Total care; intake reduced; LOC full/confusion  [] 20% Bed Bound; Extensive disease; Total care; intake minimal; Drowsy/coma  [] 10% Bed Bound; Extensive disease;  Total care; Mouth care only; Drowsy/coma  [] 0% Death    PPS: 50     Vitals:    /68   Pulse 95   Temp 96.3 °F (35.7 °C) (Oral)   Resp 20   Ht 6' 1\" (1.854 m)   Wt 223 lb 8.7 oz (101.4 kg)   SpO2 95%   BMI 29.49 kg/m²     Labs:    BMP:   Recent Labs     11/23/20  0534 11/24/20 0427 11/24/20  0938   * 134* 136   K 5.0 5.2* 4.7   CL 99 96* 96*   CO2 12* 17* 18*   BUN 25* 36* 36*   CREATININE 1.0 1.3 1.2   GLUCOSE 136* 85 71     CBC:   Recent Labs     11/22/20  1131 11/23/20  0534 11/24/20 0225 11/24/20 0427   WBC 8.3 7.6  --  14.0*   HGB 11.9* 11.7*  --  11.2*   HCT 37.2* 35.2*  --  34.1*   PLT 94* 85* 84* 80*       LFT's:   Recent Labs     11/22/20  1131 11/23/20  0534 11/24/20  0427   AST 55* 360* 2,958*   ALT 20 235* 461*   BILITOT 1.4* 1.9* 3.6*   ALKPHOS 117 112 114     Troponin:   Recent Labs     11/22/20  1131   TROPONINI 0.03*     BNP: No results for input(s): BNP in the last 72 hours. ABGs: No results for input(s): PHART, ILN6NQE, PO2ART in the last 72 hours. INR:   Recent Labs     11/23/20  2347 11/24/20  0427 11/24/20  1351   INR 3.47* 3.14* 3.30*       U/A:  Recent Labs     11/22/20  1454   COLORU Yellow   PHUR 5.5   WBCUA 0-2   RBCUA None seen   BACTERIA Rare*   CLARITYU Clear   SPECGRAV 1.020   LEUKOCYTESUR Negative   UROBILINOGEN 4.0*   BILIRUBINUR Negative   BLOODU Negative   GLUCOSEU Negative       CT ABDOMEN PELVIS W IV CONTRAST Additional Contrast? None   Final Result      Cholelithiasis with gallbladder wall thickening and pericholecystic fluid consistent with acute cholecystitis. Please correlate clinically. Small amount of free ascitic fluid. Moderate bilateral effusions with compressive atelectasis and cardiac enlargement as well as evidence of right heart failure. Small kidneys bilaterally with simple cyst in the left kidney. Severe fatty infiltration of the liver. Radiation seeds in the prostate                  Conclusion/Time spent:         Recommendations see above    Time spent with patient and/or family: 20  Time reviewing records: 10 min   Time communicating with staff: 5 min     A total of 35 minutes spent with the patient and family on unit greater than 50% in counseling regarding palliative care and in goals of care for the patient. Thank you to Dr. Bev Bacon for this consultation. We will continue to follow Mr. Maryam Delgadillo care as needed.       Cherie [facility]  Inpatient Palliative Care  [phone]

## 2020-11-24 NOTE — PLAN OF CARE
Problem: Skin Integrity:  Goal: Will show no infection signs and symptoms  Description: Will show no infection signs and symptoms  Outcome: Ongoing  Note: Pt at risk for skin breakdown. Skin assessed this shift. No new breakdown noted. Pressure ulcer precautions in place. Patient turns and repositions self every 2 hours. Problem: Skin Integrity:  Goal: Absence of new skin breakdown  Description: Absence of new skin breakdown  Outcome: Ongoing     Problem: Falls - Risk of:  Goal: Will remain free from falls  Description: Will remain free from falls  Outcome: Ongoing     Problem: Pain:  Goal: Pain level will decrease  Description: Pain level will decrease  Outcome: Ongoing  Note: No c/o pain this shift.

## 2020-11-24 NOTE — PROGRESS NOTES
Consult dictated. Imp/Plan 1. Acute Cholecystitis with sepsis accounting for acute elevation liver enzymes. If concerned about choledocholithiais could consider MRCP if no pacemaker, otherwise, ERCP which our service cannot provide at [facility] and would need to contact [facility] if needed. Continue treatment IV antibiotics at present abd correction coagulopathy in case need therapeutic intervention. Will follow.

## 2020-11-24 NOTE — CONSULTS
[facility]                        Consult Note      Requesting Physician:  Suzanne Mckenzie    CHIEF COMPLAINT:   Chief Complaint   Patient presents with    Abdominal Pain         HISTORY OF PRESENT ILLNESS:      Mr. Oscar Pearson  is a 80 y.o. male we are seeing in consultation for coagulopathy. We are asked to see this patient on a stat basis. He apparently has developed acute cholecystitis and has a severe coagulopathy. He has a history of atrial fibrillation and an aortic valve replacement and is on chronic Eliquis. Through a call to his nursing facility we discovered that his last dose of Eliquis was on 11/21 at 8 PM.  He has not received any of this medication while in the hospital.  Though it is difficult to decipher in epic it does not appear he has received any other anticoagulants during this admission.       Past Medical History:        Diagnosis Date    Arthritis     Back pain     CAD (coronary artery disease)     Cancer ([facility] [address]     prostate    Diabetes mellitus ([facility] [address]     Hyperlipidemia     Hypertension     Rosacea      Past Surgical History:        Procedure Laterality Date    BACK SURGERY      CATARACT REMOVAL WITH IMPLANT      COLONOSCOPY  9/21/11    COLONOSCOPY  2/11/2015    polyp, diverticulosis    CORONARY ANGIOPLASTY WITH STENT PLACEMENT      CORONARY ARTERY BYPASS GRAFT      EYE SURGERY      JOINT REPLACEMENT      knee    PROSTATE SURGERY      seed implants    SHOULDER SURGERY         Current Medications:    Current Facility-Administered Medications: 0.9 % sodium chloride infusion, , Intravenous, Continuous  hydrocortisone (CORTEF) tablet 30 mg, 30 mg, Oral, Nightly  guaiFENesin (MUCINEX) extended release tablet 600 mg, 600 mg, Oral, BID PRN  metoprolol tartrate (LOPRESSOR) tablet 25 mg, 25 mg, Oral, Daily  insulin glargine (LANTUS;BASAGLAR) injection pen 10 Units, 10 Units, Subcutaneous, Nightly  meropenem (MERREM) 1 g in sodium chloride 0.9 % 100 mL IVPB (mini-bag), 1 g, Intravenous, Q8H  magnesium hydroxide (MILK OF MAGNESIA) 400 MG/5ML suspension 30 mL, 30 mL, Oral, Daily PRN  [Held by provider] polyethylene glycol (GLYCOLAX) packet 17 g, 17 g, Oral, Daily  bisacodyl (DULCOLAX) suppository 10 mg, 10 mg, Rectal, Daily PRN  carbidopa-levodopa (SINEMET)  MG per tablet 1 tablet, 1 tablet, Oral, TID  vitamin B-12 (CYANOCOBALAMIN) tablet 1,000 mcg, 1,000 mcg, Oral, Daily  digoxin (LANOXIN) tablet 125 mcg, 125 mcg, Oral, Daily  docusate sodium (COLACE) capsule 100 mg, 100 mg, Oral, BID  linaclotide (LINZESS) capsule 145 mcg, 145 mcg, Oral, QAM AC  mupirocin (BACTROBAN) 2 % ointment, , Topical, Daily  tamsulosin (FLOMAX) capsule 0.4 mg, 0.4 mg, Oral, Nightly  traZODone (DESYREL) tablet 200 mg, 200 mg, Oral, Nightly  lamoTRIgine (LAMICTAL) tablet 25 mg, 25 mg, Oral, BID  atorvastatin (LIPITOR) tablet 20 mg, 20 mg, Oral, Nightly  sodium chloride flush 0.9 % injection 10 mL, 10 mL, Intravenous, 2 times per day  sodium chloride flush 0.9 % injection 10 mL, 10 mL, Intravenous, PRN  acetaminophen (TYLENOL) tablet 650 mg, 650 mg, Oral, Q6H PRN **OR** acetaminophen (TYLENOL) suppository 650 mg, 650 mg, Rectal, Q6H PRN  polyethylene glycol (GLYCOLAX) packet 17 g, 17 g, Oral, Daily PRN  promethazine (PHENERGAN) tablet 12.5 mg, 12.5 mg, Oral, Q6H PRN **OR** ondansetron (ZOFRAN) injection 4 mg, 4 mg, Intravenous, Q6H PRN  LORazepam (ATIVAN) tablet 0.5 mg, 0.5 mg, Oral, Nightly PRN  glucose (GLUTOSE) 40 % oral gel 15 g, 15 g, Oral, PRN  dextrose 50 % IV solution, 12.5 g, Intravenous, PRN  glucagon (rDNA) injection 1 mg, 1 mg, Intramuscular, PRN  dextrose 5 % solution, 100 mL/hr, Intravenous, PRN  insulin lispro (1 Unit Dial) 0-6 Units, 0-6 Units, Subcutaneous, TID WC  insulin lispro (1 Unit Dial) 0-3 Units, 0-3 Units, Subcutaneous, Nightly  hydrocortisone (CORTEF) tablet 30 mg, 30 mg, Oral, Daily  Allergies:  Ramipril; Gabapentin; and Morphine    Social History:      Social History     Socioeconomic History hematuria. · Musculoskeletal:  Generalized weakness. No joint complaints. · Integumentary: No rash or pruritis. · Neurological: No headache, diplopia. No change in gait, balance, or coordination. No paresthesias. · Endocrine: No temperature intolerance. No excessive thirst, fluid intake, or urination. · Hematologic/Lymphatic: No abnormal bruising or ecchymoses, blood clots or swollen lymph nodes. · Allergic/Immunologic: No nasal congestion or hives. PHYSICAL EXAM:      Vitals:  /81   Pulse 97   Temp 97.5 °F (36.4 °C) (Oral)   Resp 18   Ht 6' 1\" (1.854 m)   Wt 223 lb 8.7 oz (101.4 kg)   SpO2 96%   BMI 29.49 kg/m²     CONSTITUTIONAL:  awake, alert, cooperative, no apparent distress, and appears stated age NAD  EYES:  Lids and lashes normal, pupils equal, round and reactive to light, extra ocular muscles intact, sclera clear, conjunctiva normal  ENT:  Normocephalic, without obvious abnormality, atramatic, sinuses nontender on palpation, external ears without lesions, oral pharynx with moist mucus membranes, tonsils without erythema or exudates, gums normal and good dentition.   NECK:  Supple, symmetrical, trachea midline, no adenopathy, thyroid symmetric, not enlarged and no tenderness, skin normal  HEMATOLOGIC/LYMPHATICS:  no cervical lymphadenopathy, no supraclavicular lymphadenopathy, no axillary lymphadenopathy and no inguinal lymphadenopathy  LUNGS:  No increased work of breathing, good air exchange, clear to auscultation bilaterally, no crackles or wheezing  CARDIOVASCULAR:  , regular rate and rhythm, normal S1 and S2, no S3 or S4, and no murmur noted  ABDOMEN:  No scars, normal bowel sounds, soft, non-distended, non-tender, no masses palpated, no hepatosplenomegally  CHEST/BREASTS:  Breasts symmetrical, skin without lesion(s), no nipple retraction or dimpling, no nipple discharge, no masses palpated, no axillary or supraclavicular adenopathy    MUSCULOSKELETAL:  There is no redness, warmth, or swelling of the joints. Full range of motion noted. Motor strength is 5 out of 5 all extremities bilaterally. NEUROLOGIC:  Awake, alert, oriented to name, place and time. Cranial nerves II-XII are grossly intact. Motor is 5 out of 5 bilaterally. SKIN:  no bruising or bleeding      DATA:    PT/INR:    Recent Labs     11/22/20  1131 11/23/20  0534  11/23/20  1531 11/23/20  2347 11/24/20  0427   PROT 6.7 6.4  --   --   --  6.3*   INR  --  2.87*   < > 2.90* 3.47* 3.14*    < > = values in this interval not displayed. PTT:  No results for input(s): APTT in the last 72 hours. CMP:    Lab Results   Component Value Date     11/24/2020    K 5.2 11/24/2020    K 5.0 11/22/2020    CL 96 11/24/2020    CO2 17 11/24/2020    BUN 36 11/24/2020    PROT 6.3 11/24/2020     Magnesium:    Lab Results   Component Value Date    MG 2.00 11/24/2020     Phosphorus:  No components found for: PO4  Calcium:  No components found for: CA  CBC:    Lab Results   Component Value Date    WBC 14.0 11/24/2020    RBC 3.49 11/24/2020    HGB 11.2 11/24/2020    HCT 34.1 11/24/2020    MCV 97.7 11/24/2020    RDW 19.3 11/24/2020    PLT 80 11/24/2020     DIFF:    Lab Results   Component Value Date    MCV 97.7 11/24/2020    RDW 19.3 11/24/2020        IMPRESSION/RECOMMENDATIONS:  1. Coagulopathy:  He clearly has a severe coagulopathy with an INR of 3.1 and mild thrombocytopenia. Since he has been off anticoagulants I believe this is likely due to sepsis and DIC from his gallbladder. Obviously the ultimate answer to fixing that problem will be to solve his gallbladder problem. In order to enable that I would give vitamin K 5 mg subcu and begin transfusing FFP to try to get his INR into a reasonable range to allow for percutaneous cholecystotomy. I have discussed this plan with Dr. Lse Webb and he will put those orders into place.   There will need to be a careful balancing of his fluid volume in light of his history of congestive heart failure and with an prosthetic valve he will need to get back on some form of anticoagulation once it is deemed safe. Thank you for the consultation.   Will follow with you

## 2020-11-24 NOTE — PROGRESS NOTES
Ramirez catheter placed using sterile technique per order. Urine returned immediately. Pt tolerated well. Will continue to monitor intake and output.

## 2020-11-24 NOTE — CONSULTS
[facility]    [facility]. [facility]              [phone]                          Interval History and plan:      Change hydrocortisone to 30 mg p.o. twice daily. Worsening liver function is contributing to his severe lactic acidosis. Start normal saline. Patient may require cholecystectomy if possible otherwise perc Hortensia. Place a Ramirez catheter  Continue antibiotics  Monitor renal function daily                   Assessment :     Severe anion gap metabolic acidosis with lactic acidosis:  Lactate corrected, evaluation is 9.0 with anion gap of 21. He also has mild hyperkalemia with hyponatremia. Blood pressure is stable. Urine output is not accurately measured is about 450 mL. He is afebrile. Acute cholecystitis:  He is on meropenem  Surgery is on the case. CT scan showed gallstones and pericholecystic fluid consistent with acute cholecystitis. Bilateral kidneys are small and simple cyst in the left kidney. Adrenal insufficiency:  He is on hydrocortisone which will be continued. Patient does have history of congestive heart failure with EF of 20 to 25%. [facility] would like to thank Alexei Walsh MD   for opportunity to serve this patient      Please call with questions at-   24 Hrs Answering service [phone] or  7 am- 5 pm via Perfect serve or cell phone  Alfie Chaney          CC/reason for consult :     Acidosis and hyperkalemia. HPI :     Oneal Jimenez is a 80 y.o. male presented to   the hospital on 11/22/2020 with abdominal pain. He has past medical history of hypertension, hyperlipidemia and osteoarthritis. He presented with abdominal pain. He also history of Parkinson's disease. His pain is in the right upper quadrant for about 1 week. It is not associated with any nausea and vomiting. He lives in a nursing facility and was told to come to the hospital.    He is now admitted for acute cholecystitis and is n.p.o.   He was on Medication:     Current Facility-Administered Medications: guaiFENesin (MUCINEX) extended release tablet 600 mg, 600 mg, Oral, BID PRN  metoprolol tartrate (LOPRESSOR) tablet 25 mg, 25 mg, Oral, Daily  insulin glargine (LANTUS;BASAGLAR) injection pen 10 Units, 10 Units, Subcutaneous, Nightly  meropenem (MERREM) 1 g in sodium chloride 0.9 % 100 mL IVPB (mini-bag), 1 g, Intravenous, Q8H  magnesium hydroxide (MILK OF MAGNESIA) 400 MG/5ML suspension 30 mL, 30 mL, Oral, Daily PRN  [Held by provider] polyethylene glycol (GLYCOLAX) packet 17 g, 17 g, Oral, Daily  bisacodyl (DULCOLAX) suppository 10 mg, 10 mg, Rectal, Daily PRN  carbidopa-levodopa (SINEMET)  MG per tablet 1 tablet, 1 tablet, Oral, TID  vitamin B-12 (CYANOCOBALAMIN) tablet 1,000 mcg, 1,000 mcg, Oral, Daily  digoxin (LANOXIN) tablet 125 mcg, 125 mcg, Oral, Daily  docusate sodium (COLACE) capsule 100 mg, 100 mg, Oral, BID  linaclotide (LINZESS) capsule 145 mcg, 145 mcg, Oral, QAM AC  mupirocin (BACTROBAN) 2 % ointment, , Topical, Daily  tamsulosin (FLOMAX) capsule 0.4 mg, 0.4 mg, Oral, Nightly  traZODone (DESYREL) tablet 200 mg, 200 mg, Oral, Nightly  lamoTRIgine (LAMICTAL) tablet 25 mg, 25 mg, Oral, BID  atorvastatin (LIPITOR) tablet 20 mg, 20 mg, Oral, Nightly  sodium chloride flush 0.9 % injection 10 mL, 10 mL, Intravenous, 2 times per day  sodium chloride flush 0.9 % injection 10 mL, 10 mL, Intravenous, PRN  acetaminophen (TYLENOL) tablet 650 mg, 650 mg, Oral, Q6H PRN **OR** acetaminophen (TYLENOL) suppository 650 mg, 650 mg, Rectal, Q6H PRN  polyethylene glycol (GLYCOLAX) packet 17 g, 17 g, Oral, Daily PRN  promethazine (PHENERGAN) tablet 12.5 mg, 12.5 mg, Oral, Q6H PRN **OR** ondansetron (ZOFRAN) injection 4 mg, 4 mg, Intravenous, Q6H PRN  LORazepam (ATIVAN) tablet 0.5 mg, 0.5 mg, Oral, Nightly PRN  glucose (GLUTOSE) 40 % oral gel 15 g, 15 g, Oral, PRN  dextrose 50 % IV solution, 12.5 g, Intravenous, PRN  glucagon (rDNA) injection 1 mg, 1 mg,

## 2020-11-24 NOTE — PROGRESS NOTES
Occupational Therapy / Physical Therapy  D/c Note     Order received, chart reviewed - per MD notes pt has sepsis- Acute cholecystitis and awaiting surgical  intervention. Per RN receiving multiple transfusions and not appropriate for therapy this date. Will sign off from OT /PT services and await new orders s/p surgery. Pt non ambulatory at baseline - W/c level for mobility at Assisted Living. RN aware.      Jaspal Finn OTR/L  [place], [place]

## 2020-11-24 NOTE — PROGRESS NOTES
Hospitalist Progress Note      PCP: Agata THACKER    Date of Admission: 11/22/2020    Chief Complaint: abd pain    Subjective: Worsening lactic acidosis, hepatic dysfunction. Afebrile. Mild leukocytosis. Pain overall controlled. No n/v. Had a bm this morning.        Medications:  Reviewed    Infusion Medications    sodium chloride 100 mL/hr at 11/24/20 0932    dextrose       Scheduled Medications    hydrocortisone  30 mg Oral Nightly    phytonadione (VITAMIN K)  IVPB  5 mg Intravenous Once    0.9 % sodium chloride  250 mL Intravenous Once    metoprolol tartrate  25 mg Oral BID    insulin glargine  10 Units Subcutaneous Nightly    meropenem  1 g Intravenous Q8H    [Held by provider] polyethylene glycol  17 g Oral Daily    carbidopa-levodopa  1 tablet Oral TID    cyanocobalamin  1,000 mcg Oral Daily    digoxin  125 mcg Oral Daily    docusate sodium  100 mg Oral BID    linaclotide  145 mcg Oral QAM AC    mupirocin   Topical Daily    tamsulosin  0.4 mg Oral Nightly    traZODone  200 mg Oral Nightly    lamoTRIgine  25 mg Oral BID    atorvastatin  20 mg Oral Nightly    sodium chloride flush  10 mL Intravenous 2 times per day    insulin lispro  0-6 Units Subcutaneous TID WC    insulin lispro  0-3 Units Subcutaneous Nightly    hydrocortisone  30 mg Oral Daily     PRN Meds: guaiFENesin, magnesium hydroxide, bisacodyl, sodium chloride flush, acetaminophen **OR** acetaminophen, polyethylene glycol, promethazine **OR** ondansetron, LORazepam, glucose, dextrose, glucagon (rDNA), dextrose      Intake/Output Summary (Last 24 hours) at 11/24/2020 1303  Last data filed at 11/24/2020 1140  Gross per 24 hour   Intake 2307 ml   Output 550 ml   Net 1757 ml       Physical Exam Performed:    /68   Pulse 95   Temp 96.3 °F (35.7 °C) (Oral)   Resp 20   Ht 6' 1\" (1.854 m)   Wt 223 lb 8.7 oz (101.4 kg)   SpO2 95%   BMI 29.49 kg/m²     General appearance: comfortable appearing, not acutely distressed   Respiratory:  Normal respiratory effort. Clear to auscultation, bilaterally without Rales/Wheezes/Rhonchi. Cardiovascular: Regular rate and rhythm with normal S1/S2 without murmurs, rubs or gallops. Abdomen: Soft,moderate RUQ tenderness, non-distended with normal bowel sounds. Musculoskeletal: No clubbing, cyanosis or edema bilaterally. Skin: Skin color, texture, turgor normal.  No rashes or lesions. Neurologic:  Neurovascularly intact without any focal sensory/motor deficits. Cranial nerves: II-XII intact, grossly non-focal.  Psychiatric: Alert and oriented, thought content appropriate, normal insight  Capillary Refill: Brisk,< 3 seconds   Peripheral Pulses: +2 palpable, equal bilaterally       Labs:   Recent Labs     11/22/20  1131 11/23/20  0534 11/24/20  0225 11/24/20 0427   WBC 8.3 7.6  --  14.0*   HGB 11.9* 11.7*  --  11.2*   HCT 37.2* 35.2*  --  34.1*   PLT 94* 85* 84* 80*     Recent Labs     11/23/20  0534 11/24/20  0427 11/24/20  0938   * 134* 136   K 5.0 5.2* 4.7   CL 99 96* 96*   CO2 12* 17* 18*   BUN 25* 36* 36*   CREATININE 1.0 1.3 1.2   CALCIUM 9.2 9.6 9.4   PHOS 4.8 5.0* 4.8     Recent Labs     11/22/20  1131 11/23/20  0534 11/24/20 0427   AST 55* 360* 2,958*   ALT 20 235* 461*   BILIDIR 0.4* 0.5* 1.9*   BILITOT 1.4* 1.9* 3.6*   ALKPHOS 117 112 114     Recent Labs     11/23/20  1531 11/23/20  2347 11/24/20 0427   INR 2.90* 3.47* 3.14*     Recent Labs     11/22/20  1131   TROPONINI 0.03*       Urinalysis:      Lab Results   Component Value Date    NITRU Negative 11/22/2020    WBCUA 0-2 11/22/2020    BACTERIA Rare 11/22/2020    RBCUA None seen 11/22/2020    BLOODU Negative 11/22/2020    SPECGRAV 1.020 11/22/2020    GLUCOSEU Negative 11/22/2020       Radiology:  CT ABDOMEN PELVIS W IV CONTRAST Additional Contrast? None   Final Result      Cholelithiasis with gallbladder wall thickening and pericholecystic fluid consistent with acute cholecystitis. Please correlate clinically. Small amount of free ascitic fluid. Moderate bilateral effusions with compressive atelectasis and cardiac enlargement as well as evidence of right heart failure. Small kidneys bilaterally with simple cyst in the left kidney. Severe fatty infiltration of the liver. Radiation seeds in the prostate            CT PERC CHOLECYSTOSTOMY    (Results Pending)           Assessment/Plan:    Active Hospital Problems    Diagnosis    Acute cholecystitis [K81.0]         Sepsis 2/2 acute cholecystitis - concern for possible evolving cholangitis. Discussed with IR and surgery, coagulopathy limits ability for any procedures at this time. Continue with IV merrem, repeat cultures. Continue with FFP transfusions and repeat VIt K today to attempt to correct coagulopathy in preparation for percutaneous drain placement. Monitor serial INRs      transaminitis - worsening, 2/2 above. GI consulted for further evaluation, consideration for MRCP/ERCP     afib - rate mildly above goal, continue with IVF, continue digoxin. BB added per cards. Hold anticoagulation for possible procedures, coagulopathy      Coagulopathy and thrombocytopenia - 2/2 likely sepsis and DIC. Continue with intermittent transfusions and monitor CBC, INR     Chronic systolic CHF - EF 95%, appears compensated currently without volume overload. Continue to monitor with FFP and fluid resuscitation     Lactic acidosis - 2/2 ongoing hepatic dysfunction, anion gap improved on repeat BMP today. Hyperkalemia improved currently. Nephrology consulted     parkinsons - continue with home medications     Chronic steroid use - increased to stress dose steroids given acute infection. Monitor for hypotension     Hx TAVR       DVT Prophylaxis: contraindicated   Diet: Diet NPO Effective Now  Code Status: DNR-CCA  PT/OT Eval Status: not consulted     Dispo - > 2 d     Advanced care planning note:     Met with patient at bedside and discussed code status.  Discussed current diagnoses of sepsis, acute cholecystits, liver dysfunction. Discussed what a full code entails in the event of a cardiac arrest, and patient states that he would not want resuscitation for cardiac arrest. Agreeable to intubation if required though would not want tracheostomy or other longer term interventions. Code status changed to [id number]-cca. Time spent in advanced care planning 17 mins.          Janice Richter MD

## 2020-11-24 NOTE — PROGRESS NOTES
Gen Surg   POC    Went up to assess patient at call given lactate trending upward and nurse notification that patient looks worse and c/o \"pain all over\". Patient A&Ox2, oriented to self and time, not to place. Thinks he is at home. Patient trying to rest. States he's tired and cold. His vitals are all within normal limits, he is saturating 93% on room air. Abdomen is soft, obese, non tender to palpation. Lungs clear to ausculation, no wheezing. LE without edema bilaterally. Discussed with primary medicine CHF and EF of 20-25% on last echo, after receiving a small bolus of 500 cc normal saline earlier in the evening and started on LR at 100, concern for potential fluid overload, therefore discontinued IVF at that time. Recheck of lactate after midnight 8.5 from 7.5 and INR increased 3.47 from 2.9. Patient in need of percutaneous cholecystostomy tube placement, re-ordered another 2U FFP in attempt to correct INR, patient received 10mg Vit K yesterday in total, should be sufficient. Spoke to nurse directed to give FFP slowly, and monitor carefully for increased O2 requirements or any signs of fluid overload. Will continue to reassess closely.      Lm Burroughs MD   Gen Surg PGY 3  11/24/2020  3:05 AM  [phone]

## 2020-11-24 NOTE — CONSULTS
Cardiology Consultation                                                                    Pt Name: Marcie Bañuelos  Age: 80 y.o. Sex: male  : 1933  Location: [id number]    Referring Physician: Milton Cavanaugh MD  Primary cardiologist: Dr Efrain Greco, Dr Rashawn Beard at [facility]      Reason for Consult:     Reason for Consultation/Chief Complaint: HF management perioperatively. HPI:     Marcie Bañuelos is a 80 y.o. male with a past medical history of chronic atrial fibrillation on Eliquis for years, CAD status post CABG x2 in  and then 8 stents (lastly 5 years ago), severe AS s/p TAVR in 10/2016 at [facility], single-lead ICD, HFrEF (EF 20-25%). He also has DM, Parkinson's disease, TIA's.      Echo 2019 at [facility]: Mild LV dilation, EF 20 to 25%, normal bioprosthetic aVR, moderate MR, RV dysfunction, moderate TR, bubble study negative. Presented to the emergency room on 2020 with right upper quadrant abdominal pain. He was diagnosed with acute cholecystitis, sepsis. Cardiology was consulted for his heart failure and likely perioperative course. Patient remains afebrile, hemodynamically stable, mildly tachy, normal oxygen saturation on room air, on normal saline at 100 mL/h. Creatinine increasing at 1.3 from 0.8, lactic acid increasing, now at 9, LFTs and bilirubin increasing, WBC increasing, platelets low at 80. Hematology was consulted for the thrombocytopenia and it was thought to be due to DIC/sepsis. Plan includes possible percutaneous cholecystotomy tube. ECG: A. fib, rate 80, left bundle branch block. Histories     Past Medical History:   has a past medical history of Arthritis, Back pain, CAD (coronary artery disease), Cancer ([facility] [address] Diabetes mellitus ([facility] [address] Hyperlipidemia, Hypertension, and Rosacea. Surgical History:   has a past surgical history that includes Coronary angioplasty with stent; back surgery; joint replacement; shoulder surgery; eye surgery;  Cataract removal with implant; Coronary artery bypass graft; Prostate surgery; Colonoscopy (9/21/11); and Colonoscopy (2/11/2015). Social History:   reports that he has never smoked. He has never used smokeless tobacco. He reports that he does not drink alcohol or use drugs. Family History:  No evidence for sudden cardiac death or premature CAD      Medications:       Home Medications  Were reviewed and are listed in nursing record. and/or listed below  Prior to Admission medications    Medication Sig Start Date End Date Taking? Authorizing Provider   venlafaxine (EFFEXOR XR) 37.5 MG extended release capsule Take 37.5 mg by mouth daily   Yes Historical Provider, MD   LORazepam (ATIVAN) 1 MG tablet Take 1 mg by mouth nightly as needed for Anxiety (0.5-1 mg at bedtime as needed). Yes Historical Provider, MD   oxyCODONE (ROXICODONE) 5 MG immediate release tablet Take 5 mg by mouth every 6 hours as needed for Pain.    Yes Historical Provider, MD   Insulin Aspart (NOVOLOG FLEXPEN SC) Inject 10 Units into the skin Tid with meals   (in addition to sliding scale)   Yes Historical Provider, MD   vitamin D 25 MCG (1000 UT) CAPS Take 1,000 Units by mouth daily   Yes Historical Provider, MD   guaiFENesin (MUCINEX) 600 MG extended release tablet Take 1,200 mg by mouth 2 times daily as needed for Congestion   Yes Historical Provider, MD   lamoTRIgine (LAMICTAL) 25 MG tablet Take 1 tablet by mouth 2 times daily  Patient taking differently: Take 25 mg by mouth 2 times daily 0700 and 1600 10/20/20  Yes Courtney Kimble MD   atorvastatin (LIPITOR) 20 MG tablet Take 1 tablet by mouth nightly 10/20/20  Yes Courtney Kimble MD   bisacodyl (DULCOLAX) 10 MG suppository Place 10 mg rectally daily as needed for Constipation   Yes Historical Provider, MD   carbidopa-levodopa (SINEMET)  MG per tablet Take 1 tablet by mouth 3 times daily   Yes Historical Provider, MD   cyanocobalamin 1000 MCG tablet Take 1,000 mcg by mouth daily   Yes Historical Provider, MD   polyethylene glycol (GLYCOLAX) packet Take 17 g by mouth daily    Yes Historical Provider, MD   acetaminophen (TYLENOL) 325 MG tablet Take 650 mg by mouth every 4 hours as needed for Pain    Yes Historical Provider, MD          Inpatient Medications:   hydrocortisone  30 mg Oral Nightly    phytonadione (VITAMIN K)  IVPB  5 mg Intravenous Once    0.9 % sodium chloride  250 mL Intravenous Once    metoprolol tartrate  25 mg Oral BID    insulin glargine  10 Units Subcutaneous Nightly    meropenem  1 g Intravenous Q8H    [Held by provider] polyethylene glycol  17 g Oral Daily    carbidopa-levodopa  1 tablet Oral TID    cyanocobalamin  1,000 mcg Oral Daily    digoxin  125 mcg Oral Daily    docusate sodium  100 mg Oral BID    linaclotide  145 mcg Oral QAM AC    mupirocin   Topical Daily    tamsulosin  0.4 mg Oral Nightly    traZODone  200 mg Oral Nightly    lamoTRIgine  25 mg Oral BID    atorvastatin  20 mg Oral Nightly    sodium chloride flush  10 mL Intravenous 2 times per day    insulin lispro  0-6 Units Subcutaneous TID WC    insulin lispro  0-3 Units Subcutaneous Nightly    hydrocortisone  30 mg Oral Daily       IV drips:   sodium chloride 100 mL/hr at 11/24/20 0932    dextrose         PRN:  guaiFENesin, magnesium hydroxide, bisacodyl, sodium chloride flush, acetaminophen **OR** acetaminophen, polyethylene glycol, promethazine **OR** ondansetron, LORazepam, glucose, dextrose, glucagon (rDNA), dextrose    Allergy:     Ramipril; Gabapentin; and Morphine       Review of Systems:     All 12 point review of symptoms completed. Pertinent positives identified in the HPI, all other review of symptoms negative as below. CONSTITUTIONAL: No fatigue  SKIN: No rash or pruritis. EYES: No visual changes or diplopia. No scleral icterus. ENT: No Headaches, hearing loss or vertigo. No mouth sores or sore throat. CARDIOVASCULAR: No chest pain/chest pressure/chest discomfort. No palpitations. No edema. RESPIRATORY: No dyspnea. No cough or wheezing, no sputum production. GASTROINTESTINAL: No N/V/D. + abdominal pain, appetite loss, blood in stools. GENITOURINARY: No dysuria, trouble voiding, or hematuria. MUSCULOSKELETAL:  No gait disturbance, weakness or joint complaints. NEUROLOGICAL: No headache, diplopia, change in muscle strength, numbness or tingling. No change in gait, balance, coordination, mood, affect, memory, mentation, behavior. ENDOCRINE: No excessive thirst, fluid intake, or urination. No tremor. HEMATOLOGIC: No abnormal bruising or bleeding. ALLERGY: No nasal congestion or hives. Physical Examination:     Vitals:    11/24/20 0618 11/24/20 0930 11/24/20 1137 11/24/20 1140   BP: 130/81 (!) 143/83 121/73 121/68   Pulse: 97 117 98 95   Resp: 18 20 18 20   Temp: 97.5 °F (36.4 °C) 96.5 °F (35.8 °C) 96.5 °F (35.8 °C) 96.3 °F (35.7 °C)   TempSrc: Oral Oral Oral Oral   SpO2: 96% 94% 94% 95%   Weight:       Height:           Wt Readings from Last 3 Encounters:   11/22/20 223 lb 8.7 oz (101.4 kg)   10/20/20 217 lb 13 oz (98.8 kg)   07/26/20 202 lb (91.6 kg)         General Appearance:  Alert, cooperative, no distress, appears stated age Appropriate weight   Head:  Normocephalic, without obvious abnormality, atraumatic   Eyes:  PERRL, conjunctiva/corneas clear EOM intact  Ears normal   Throat no lesions       Nose: Nares normal, no drainage or sinus tenderness   Throat: Lips, mucosa, and tongue normal   Neck: Supple, symmetrical, trachea midline, no adenopathy, thyroid: not enlarged, symmetric, no tenderness/mass/nodules, no carotid bruit. Lungs:   Respirations unlabored, clear to auscultation bilaterally, without any wheezes, rubs or ronchi.     Chest Wall:  No tenderness or deformity   Heart:  Irregular rhythm, rate is relatively controlled, S1, S2 normal, there is no murmur, there is no rub or gallop, no jvd, no bilateral lower extremity edema   Abdomen:   Soft, non-tender, bowel sounds active all four quadrants,  no masses, no organomegaly       Extremities: Extremities normal, atraumatic, no cyanosis. Pulses: 2+ and symmetric   Skin: Skin color, texture, turgor normal, no rashes or lesions   Pysch: Normal mood and affect   Neurologic: Normal gross motor and sensory exam.  Cranial nerves intact        Labs:     Recent Labs     11/22/20  1131 11/23/20  0534 11/24/20  0427 11/24/20  0938   * 133* 134* 136   K 5.0 5.0 5.2* 4.7   BUN 19 25* 36* 36*   CREATININE 0.8 1.0 1.3 1.2   CL 96* 99 96* 96*   CO2 21 12* 17* 18*   GLUCOSE 230* 136* 85 71   CALCIUM 9.2 9.2 9.6 9.4   MG  --  1.70* 2.00  --      Recent Labs     11/22/20  1131 11/23/20  0534 11/24/20  0225 11/24/20  0427   WBC 8.3 7.6  --  14.0*   HGB 11.9* 11.7*  --  11.2*   HCT 37.2* 35.2*  --  34.1*   PLT 94* 85* 84* 80*   MCV 98.5 96.6  --  97.7     No results for input(s): CHOLTOT, TRIG, HDL in the last 72 hours. Invalid input(s): LIPIDCOMM, CHOLHDL, VLDCHOL, LDL  Recent Labs     11/23/20  1531 11/23/20  2347 11/24/20  0427   INR 2.90* 3.47* 3.14*     Recent Labs     11/22/20  1131   TROPONINI 0.03*     No results for input(s): BNP in the last 72 hours. No results for input(s): TSH in the last 72 hours. No results for input(s): CHOL, HDL, LDLCALC, TRIG in the last 72 hours.]    Lab Results   Component Value Date    TROPONINI 0.03 (H) 11/22/2020         Imaging:     Telemetry:  afib with CVR 90-100s. Assessment / Plan:     1. AF with RVR:  Patient has chronic AF with rate controlled on BB and on AC.      -We will increase Lopressor to 25 mg p.o. twice daily (once hemodynamically stable postoperatively, could change to Toprol). -Cw dig low dose daily  -Holding anticoagulation for possible surgery.     2. CAD status post CABG and stents:  Patient is asymptomatic. His ECG is nondiagnostic due to IVCD/LBBB. There is no evidence of ACS. -Post-op could consider asa low dose and statin. -C/w BB     3.   Chronic HFrEF: Patient has severe biventricular systolic and diastolic heart failure. He remains euvolemic and hemodynamically stable.     -Cw BB, dig.   -Unable to start ACEI/ARB/spironolactone or Entresto due to CKD and sepsis. - He has an ICD. 4. Sepsis:   Due to acute desi. Will need surgical approach.     -Agree with IV fluids at this time to maintain BP  -- Strict I's and O's every shift and standing weights if possible, low-salt diet and daily BMP. -Close monitoring of oxygen sats; if decreased, will need to stop IV fluids and/or give a dose of lasix IV.  -His periop risk for CV complications is intermediate and no further testing is necessary. All questions and concerns were addressed to the patient/family. Alternatives to my treatment were discussed. I would like to thank you for providing me the opportunity to participate in the care of your patient. If you have any questions, please do not hesitate to contact me.      Cirilo Shay MD, [facility], [address]  The [address]  [address] [address] [address] [address]  Ph: [phone]  Fax: [phone]

## 2020-11-24 NOTE — PROGRESS NOTES
Surgery Daily Progress Note  Jone Warner  CC:    Subjective : pt rested well overnight. Mentation remains at baseline. complains of RUQ pain this am. Afebrile. Despite active reversal, INR continues to get worse with worsening hepatic panel. Objective    Infusions:   dextrose          I/O:I/O last 3 completed shifts: In: 1593.7 [P.O.:60; I.V.:561.7; Blood:672; IV [id number]:534]  Out: 550 [Urine:550]           Wt Readings from Last 1 Encounters:   11/22/20 223 lb 8.7 oz (101.4 kg)                 LABS:    Recent Labs     11/22/20  1131 11/23/20  0534 11/24/20  0225   WBC 8.3 7.6  --    HGB 11.9* 11.7*  --    HCT 37.2* 35.2*  --    MCV 98.5 96.6  --    PLT 94* 85* 84*        Recent Labs     11/23/20  0534 11/24/20  0427   * 134*   K 5.0 5.2*   CL 99 96*   CO2 12* 17*   PHOS 4.8 5.0*   BUN 25* 36*   CREATININE 1.0 1.3        Recent Labs     11/23/20  0534 11/24/20  0427   * 2,958*   * 461*   BILIDIR 0.5* 1.9*   BILITOT 1.9* 3.6*   ALKPHOS 112 114        Recent Labs     11/22/20  1131   LIPASE 7.0*        Recent Labs     11/23/20  0534  11/23/20  2347 11/24/20  0427   PROT 6.4  --   --  6.3*   INR 2.87*   < > 3.47* 3.14*    < > = values in this interval not displayed. Recent Labs     11/22/20  1131   TROPONINI 0.03*        Exam:/89   Pulse 100   Temp 97.3 °F (36.3 °C)   Resp 18   Ht 6' 1\" (1.854 m)   Wt 223 lb 8.7 oz (101.4 kg)   SpO2 96%   BMI 29.49 kg/m²   General appearance: alert, appears stated age and cooperative  Lungs: clear to auscultation bilaterally  Heart: Regular/ irregular HR  Abdomen: soft, tender in RUQ; bowel sounds present, non distended      ASSESSMENT/PLAN: Pt. is a 80 y.o. male s/p 10 day hx of RUQ pain. CT abd shows cholelithiasis with gallbladder wall thickening and pericholecystic fluid consistent with actue cholecystitis.     -pt transfused total of FFP as well as 10 mg Kit K with worsening INR of 3.4   - anticipate worsening hepatic panel secondary to acute cholecystitis; possible choledocholithiasis  - hepatic panel also likely effected from known HF; last echo 2019   - plan for perc desi this am at [facility]; will give [facility] pending discussion with radiology  - GI consulted for possible need of MRCP vs ERCP; pt does have pacemaker  - Need to monitor pt closely for cholangitis  - nephrology consult for worsening lactic acidosis of 9.0  -continue [employer], CNP  11/24/2020   6:11 AM  [phone]

## 2020-11-25 LAB
ALBUMIN SERPL-MCNC: 3.5 G/DL (ref 3.4–5)
ALBUMIN SERPL-MCNC: 3.5 G/DL (ref 3.4–5)
ALP BLD-CCNC: 124 U/L (ref 40–129)
ALT SERPL-CCNC: 397 U/L (ref 10–40)
ANION GAP SERPL CALCULATED.3IONS-SCNC: 16 MMOL/L (ref 3–16)
AST SERPL-CCNC: 2426 U/L (ref 15–37)
BASOPHILS ABSOLUTE: 0 K/UL (ref 0–0.2)
BASOPHILS RELATIVE PERCENT: 0.1 %
BILIRUB SERPL-MCNC: 3.4 MG/DL (ref 0–1)
BILIRUBIN DIRECT: 1.7 MG/DL (ref 0–0.3)
BILIRUBIN, INDIRECT: 1.7 MG/DL (ref 0–1)
BUN BLDV-MCNC: 45 MG/DL (ref 7–20)
CALCIUM SERPL-MCNC: 9.1 MG/DL (ref 8.3–10.6)
CHLORIDE BLD-SCNC: 98 MMOL/L (ref 99–110)
CO2: 21 MMOL/L (ref 21–32)
CREAT SERPL-MCNC: 1.1 MG/DL (ref 0.8–1.3)
EOSINOPHILS ABSOLUTE: 0 K/UL (ref 0–0.6)
EOSINOPHILS RELATIVE PERCENT: 0.1 %
GFR AFRICAN AMERICAN: >60
GFR NON-AFRICAN AMERICAN: >60
GLUCOSE BLD-MCNC: 209 MG/DL (ref 70–99)
GLUCOSE BLD-MCNC: 230 MG/DL (ref 70–99)
GLUCOSE BLD-MCNC: 267 MG/DL (ref 70–99)
GLUCOSE BLD-MCNC: 281 MG/DL (ref 70–99)
GLUCOSE BLD-MCNC: 315 MG/DL (ref 70–99)
GLUCOSE BLD-MCNC: 317 MG/DL (ref 70–99)
HCT VFR BLD CALC: 37.7 % (ref 40.5–52.5)
HEMOGLOBIN: 12.2 G/DL (ref 13.5–17.5)
INR BLD: 3.67 (ref 0.86–1.14)
LYMPHOCYTES ABSOLUTE: 0.6 K/UL (ref 1–5.1)
LYMPHOCYTES RELATIVE PERCENT: 6.6 %
MAGNESIUM: 2.1 MG/DL (ref 1.8–2.4)
MCH RBC QN AUTO: 32.5 PG (ref 26–34)
MCHC RBC AUTO-ENTMCNC: 32.3 G/DL (ref 31–36)
MCV RBC AUTO: 100.5 FL (ref 80–100)
MONOCYTES ABSOLUTE: 0.7 K/UL (ref 0–1.3)
MONOCYTES RELATIVE PERCENT: 6.9 %
NEUTROPHILS ABSOLUTE: 8.5 K/UL (ref 1.7–7.7)
NEUTROPHILS RELATIVE PERCENT: 86.3 %
PDW BLD-RTO: 20.6 % (ref 12.4–15.4)
PERFORMED ON: ABNORMAL
PHOSPHORUS: 3.9 MG/DL (ref 2.5–4.9)
PLATELET # BLD: 68 K/UL (ref 135–450)
PMV BLD AUTO: 8.6 FL (ref 5–10.5)
POTASSIUM SERPL-SCNC: 5.2 MMOL/L (ref 3.5–5.1)
PROTHROMBIN TIME: 43.1 SEC (ref 10–13.2)
RBC # BLD: 3.75 M/UL (ref 4.2–5.9)
SODIUM BLD-SCNC: 135 MMOL/L (ref 136–145)
TOTAL PROTEIN: 5.8 G/DL (ref 6.4–8.2)
WBC # BLD: 9.8 K/UL (ref 4–11)

## 2020-11-25 PROCEDURE — 85610 PROTHROMBIN TIME: CPT

## 2020-11-25 PROCEDURE — 99233 SBSQ HOSP IP/OBS HIGH 50: CPT | Performed by: INTERNAL MEDICINE

## 2020-11-25 PROCEDURE — 36415 COLL VENOUS BLD VENIPUNCTURE: CPT

## 2020-11-25 PROCEDURE — 85025 COMPLETE CBC W/AUTO DIFF WBC: CPT

## 2020-11-25 PROCEDURE — 6370000000 HC RX 637 (ALT 250 FOR IP): Performed by: INTERNAL MEDICINE

## 2020-11-25 PROCEDURE — 80076 HEPATIC FUNCTION PANEL: CPT

## 2020-11-25 PROCEDURE — 83735 ASSAY OF MAGNESIUM: CPT

## 2020-11-25 PROCEDURE — 1200000000 HC SEMI PRIVATE

## 2020-11-25 PROCEDURE — 80069 RENAL FUNCTION PANEL: CPT

## 2020-11-25 PROCEDURE — 2580000003 HC RX 258: Performed by: INTERNAL MEDICINE

## 2020-11-25 PROCEDURE — 6360000002 HC RX W HCPCS: Performed by: STUDENT IN AN ORGANIZED HEALTH CARE EDUCATION/TRAINING PROGRAM

## 2020-11-25 PROCEDURE — 2580000003 HC RX 258: Performed by: STUDENT IN AN ORGANIZED HEALTH CARE EDUCATION/TRAINING PROGRAM

## 2020-11-25 PROCEDURE — 99233 SBSQ HOSP IP/OBS HIGH 50: CPT | Performed by: SURGERY

## 2020-11-25 RX ORDER — DEXTROSE AND SODIUM CHLORIDE 5; .9 G/100ML; G/100ML
INJECTION, SOLUTION INTRAVENOUS CONTINUOUS
Status: DISCONTINUED | OUTPATIENT
Start: 2020-11-25 | End: 2020-11-26

## 2020-11-25 RX ADMIN — LAMOTRIGINE 25 MG: 25 TABLET ORAL at 06:41

## 2020-11-25 RX ADMIN — METOPROLOL TARTRATE 25 MG: 25 TABLET, FILM COATED ORAL at 21:36

## 2020-11-25 RX ADMIN — MEROPENEM 1 G: 1 INJECTION, POWDER, FOR SOLUTION INTRAVENOUS at 13:41

## 2020-11-25 RX ADMIN — Medication 10 ML: at 08:46

## 2020-11-25 RX ADMIN — DOCUSATE SODIUM 100 MG: 100 CAPSULE, LIQUID FILLED ORAL at 08:45

## 2020-11-25 RX ADMIN — HYDROCORTISONE 30 MG: 20 TABLET ORAL at 21:36

## 2020-11-25 RX ADMIN — CARBIDOPA AND LEVODOPA 1 TABLET: 25; 100 TABLET ORAL at 12:19

## 2020-11-25 RX ADMIN — MEROPENEM 1 G: 1 INJECTION, POWDER, FOR SOLUTION INTRAVENOUS at 05:18

## 2020-11-25 RX ADMIN — DEXTROSE AND SODIUM CHLORIDE: 5; 900 INJECTION, SOLUTION INTRAVENOUS at 19:48

## 2020-11-25 RX ADMIN — TAMSULOSIN HYDROCHLORIDE 0.4 MG: 0.4 CAPSULE ORAL at 21:36

## 2020-11-25 RX ADMIN — MEROPENEM 1 G: 1 INJECTION, POWDER, FOR SOLUTION INTRAVENOUS at 21:35

## 2020-11-25 RX ADMIN — INSULIN GLARGINE 10 UNITS: 100 INJECTION, SOLUTION SUBCUTANEOUS at 21:36

## 2020-11-25 RX ADMIN — DEXTROSE AND SODIUM CHLORIDE: 5; 900 INJECTION, SOLUTION INTRAVENOUS at 10:00

## 2020-11-25 RX ADMIN — CYANOCOBALAMIN TAB 1000 MCG 1000 MCG: 1000 TAB at 08:45

## 2020-11-25 RX ADMIN — LAMOTRIGINE 25 MG: 25 TABLET ORAL at 16:51

## 2020-11-25 RX ADMIN — CARBIDOPA AND LEVODOPA 1 TABLET: 25; 100 TABLET ORAL at 08:44

## 2020-11-25 RX ADMIN — HYDROCORTISONE 30 MG: 20 TABLET ORAL at 09:20

## 2020-11-25 RX ADMIN — CARBIDOPA AND LEVODOPA 1 TABLET: 25; 100 TABLET ORAL at 21:47

## 2020-11-25 RX ADMIN — DIGOXIN 125 MCG: 125 TABLET ORAL at 08:44

## 2020-11-25 RX ADMIN — TRAZODONE HYDROCHLORIDE 200 MG: 100 TABLET ORAL at 21:36

## 2020-11-25 RX ADMIN — METOPROLOL TARTRATE 25 MG: 25 TABLET, FILM COATED ORAL at 08:44

## 2020-11-25 RX ADMIN — MUPIROCIN: 20 OINTMENT TOPICAL at 08:45

## 2020-11-25 RX ADMIN — DEXTROSE AND SODIUM CHLORIDE: 5; 450 INJECTION, SOLUTION INTRAVENOUS at 04:19

## 2020-11-25 RX ADMIN — ATORVASTATIN CALCIUM 20 MG: 20 TABLET, FILM COATED ORAL at 21:36

## 2020-11-25 RX ADMIN — DOCUSATE SODIUM 100 MG: 100 CAPSULE, LIQUID FILLED ORAL at 21:36

## 2020-11-25 RX ADMIN — INSULIN GLARGINE 10 UNITS: 100 INJECTION, SOLUTION SUBCUTANEOUS at 00:15

## 2020-11-25 ASSESSMENT — PAIN SCALES - PAIN ASSESSMENT IN ADVANCED DEMENTIA (PAINAD)
BODYLANGUAGE: 0
NEGVOCALIZATION: 0
FACIALEXPRESSION: 0
BREATHING: 0
TOTALSCORE: 0
CONSOLABILITY: 0

## 2020-11-25 ASSESSMENT — PAIN SCALES - GENERAL
PAINLEVEL_OUTOF10: 0

## 2020-11-25 NOTE — PROGRESS NOTES
Hospitalist Progress Note      PCP: [address]    Date of Admission: 11/22/2020    Chief Complaint: abd pain    Subjective:       C/o anxiety  Denies any abdominal pain, nausea, diarrhea  Denies any cp, sob  INR still high. Medications:  Reviewed    Infusion Medications    dextrose 5 % and 0.45 % NaCl 100 mL/hr at 11/25/20 0419    dextrose       Scheduled Medications    hydrocortisone  30 mg Oral Nightly    metoprolol tartrate  25 mg Oral BID    0.9 % sodium chloride  250 mL Intravenous Once    insulin glargine  10 Units Subcutaneous Nightly    meropenem  1 g Intravenous Q8H    [Held by provider] polyethylene glycol  17 g Oral Daily    carbidopa-levodopa  1 tablet Oral TID    cyanocobalamin  1,000 mcg Oral Daily    digoxin  125 mcg Oral Daily    docusate sodium  100 mg Oral BID    linaclotide  145 mcg Oral QAM AC    mupirocin   Topical Daily    tamsulosin  0.4 mg Oral Nightly    traZODone  200 mg Oral Nightly    lamoTRIgine  25 mg Oral BID    atorvastatin  20 mg Oral Nightly    sodium chloride flush  10 mL Intravenous 2 times per day    hydrocortisone  30 mg Oral Daily     PRN Meds: guaiFENesin, magnesium hydroxide, bisacodyl, sodium chloride flush, acetaminophen **OR** acetaminophen, polyethylene glycol, promethazine **OR** ondansetron, LORazepam, glucose, dextrose, glucagon (rDNA), dextrose      Intake/Output Summary (Last 24 hours) at 11/25/2020 0857  Last data filed at 11/25/2020 0518  Gross per 24 hour   Intake 1275.28 ml   Output 1375 ml   Net -99.72 ml       Physical Exam Performed:    /82   Pulse 68   Temp 96.5 °F (35.8 °C) (Oral)   Resp 20   Ht 6' 1\" (1.854 m)   Wt 223 lb 8.7 oz (101.4 kg)   SpO2 97%   BMI 29.49 kg/m²     General appearance: comfortable appearing, not acutely distressed   Respiratory:  Normal respiratory effort. Clear to auscultation, bilaterally without Rales/Wheezes/Rhonchi.   Cardiovascular: Regular rate and rhythm with normal S1/S2 failure. Small kidneys bilaterally with simple cyst in the left kidney. Severe fatty infiltration of the liver. Radiation seeds in the prostate                    Assessment/Plan:    Active Hospital Problems    Diagnosis    Acute cholecystitis [K81.0]         Sepsis 2/2 acute cholecystitis - concern for possible evolving cholangitis. On Abx   INR still high. S/p FFP and Vitamin K.  Plt count 68. transaminitis - worsening, 2/2 above. GI consulted for further evaluation, consideration for MRCP/ERCP   Surprisingly alpo4 wnl.     afib - HR controlled  On BBand dig. INR >3    Coagulopathy and thrombocytopenia - 2/2 likely sepsis and DIC. Continue with intermittent transfusions and monitor CBC, INR     Chronic systolic CHF - EF 78%, appears compensated currently without volume overload. Continue to monitor with FFP and fluid resuscitation     Lactic acidosis - 2/2 ongoing hepatic dysfunction, anion gap improved on repeat BMP today. Hyperkalemia improved currently. Nephrology following. parkinsons - continue with home medications     Chronic steroid use - increased to stress dose steroids given acute infection. Monitor for hypotension     Hx TAVR       DVT Prophylaxis: contraindicated   Diet: Diet NPO Effective Now  Code Status: DNR-CCA  PT/OT Eval Status: not consulted     Dispo - > pending improvement.          Peter Beckett MD

## 2020-11-25 NOTE — PROGRESS NOTES
[facility]    [facility]              [phone]                          Interval History and plan:      Continue hydrocortisone to 30 mg p.o. twice daily. Worsening liver function is contributing to his severe lactic acidosis. Continue with IV fluid  Renal function and sodium is stable. Patient may require cholecystectomy if possible otherwise perc Hortensia. Continue antibiotics  Monitor renal function daily                   Assessment :     Severe anion gap metabolic acidosis with lactic acidosis:  Lactate corrected, evaluation is 9.0 with anion gap of 21. He also has mild hyperkalemia with hyponatremia. Blood pressure is stable. Urine output is not accurately measured is about 450 mL. He is afebrile. Acute cholecystitis:  He is on meropenem  Surgery is on the case. CT scan showed gallstones and pericholecystic fluid consistent with acute cholecystitis. Bilateral kidneys are small and simple cyst in the left kidney. Adrenal insufficiency:  He is on hydrocortisone which will be continued. Patient does have history of congestive heart failure with EF of 20 to 25%. [facility] would like to thank Asaf Luna MD   for opportunity to serve this patient      Please call with questions at-   24 Hrs Answering service [phone] or  7 am- 5 pm via Perfect serve or cell phone  [address] [address]          CC/reason for consult :     Acidosis and hyperkalemia. HPI :     John Mcmahan is a 80 y.o. male presented to   the hospital on 11/22/2020 with abdominal pain. He has past medical history of hypertension, hyperlipidemia and osteoarthritis. He presented with abdominal pain. He also history of Parkinson's disease. His pain is in the right upper quadrant for about 1 week. It is not associated with any nausea and vomiting.   He lives in a nursing facility and was told to come to the hospital.    He is now admitted for acute cholecystitis and is sister.          Medication:     Current Facility-Administered Medications: hydrocortisone (CORTEF) tablet 30 mg, 30 mg, Oral, Nightly  metoprolol tartrate (LOPRESSOR) tablet 25 mg, 25 mg, Oral, BID  0.9 % sodium chloride infusion 250 mL, 250 mL, Intravenous, Once  dextrose 5 % and 0.45 % sodium chloride infusion, , Intravenous, Continuous  guaiFENesin (MUCINEX) extended release tablet 600 mg, 600 mg, Oral, BID PRN  insulin glargine (LANTUS;BASAGLAR) injection pen 10 Units, 10 Units, Subcutaneous, Nightly  meropenem (MERREM) 1 g in sodium chloride 0.9 % 100 mL IVPB (mini-bag), 1 g, Intravenous, Q8H  magnesium hydroxide (MILK OF MAGNESIA) 400 MG/5ML suspension 30 mL, 30 mL, Oral, Daily PRN  [Held by provider] polyethylene glycol (GLYCOLAX) packet 17 g, 17 g, Oral, Daily  bisacodyl (DULCOLAX) suppository 10 mg, 10 mg, Rectal, Daily PRN  carbidopa-levodopa (SINEMET)  MG per tablet 1 tablet, 1 tablet, Oral, TID  vitamin B-12 (CYANOCOBALAMIN) tablet 1,000 mcg, 1,000 mcg, Oral, Daily  digoxin (LANOXIN) tablet 125 mcg, 125 mcg, Oral, Daily  docusate sodium (COLACE) capsule 100 mg, 100 mg, Oral, BID  linaclotide (LINZESS) capsule 145 mcg, 145 mcg, Oral, QAM AC  mupirocin (BACTROBAN) 2 % ointment, , Topical, Daily  tamsulosin (FLOMAX) capsule 0.4 mg, 0.4 mg, Oral, Nightly  traZODone (DESYREL) tablet 200 mg, 200 mg, Oral, Nightly  lamoTRIgine (LAMICTAL) tablet 25 mg, 25 mg, Oral, BID  atorvastatin (LIPITOR) tablet 20 mg, 20 mg, Oral, Nightly  sodium chloride flush 0.9 % injection 10 mL, 10 mL, Intravenous, 2 times per day  sodium chloride flush 0.9 % injection 10 mL, 10 mL, Intravenous, PRN  acetaminophen (TYLENOL) tablet 650 mg, 650 mg, Oral, Q6H PRN **OR** acetaminophen (TYLENOL) suppository 650 mg, 650 mg, Rectal, Q6H PRN  polyethylene glycol (GLYCOLAX) packet 17 g, 17 g, Oral, Daily PRN  promethazine (PHENERGAN) tablet 12.5 mg, 12.5 mg, Oral, Q6H PRN **OR** ondansetron (ZOFRAN) injection 4 mg, 4 mg, Intravenous, Q6H [phone]  Fax: [phone]

## 2020-11-25 NOTE — CONSULTS
[facility]                        Consult Note      Requesting Physician:  Rosemary Cochran    CHIEF COMPLAINT:   Chief Complaint   Patient presents with    Abdominal Pain         HISTORY OF PRESENT ILLNESS:      Mr. Adryan Cesar  is a 80 y.o. male we are seeing in consultation for coagulopathy. We are asked to see this patient on a stat basis. He apparently has developed acute cholecystitis and has a severe coagulopathy. He has a history of atrial fibrillation and an aortic valve replacement and is on chronic Eliquis. Through a call to his nursing facility we discovered that his last dose of Eliquis was on 11/21 at 8 PM.  He has not received any of this medication while in the hospital.  Though it is difficult to decipher in epic it does not appear he has received any other anticoagulants during this admission.       Past Medical History:        Diagnosis Date    Arthritis     Back pain     CAD (coronary artery disease)     Cancer ([facility]     prostate    Diabetes mellitus ([facility]     Hyperlipidemia     Hypertension     Rosacea      Past Surgical History:        Procedure Laterality Date    BACK SURGERY      CATARACT REMOVAL WITH IMPLANT      COLONOSCOPY  9/21/11    COLONOSCOPY  2/11/2015    polyp, diverticulosis    CORONARY ANGIOPLASTY WITH STENT PLACEMENT      CORONARY ARTERY BYPASS GRAFT      EYE SURGERY      JOINT REPLACEMENT      knee    PROSTATE SURGERY      seed implants    SHOULDER SURGERY         Current Medications:    Current Facility-Administered Medications: hydrocortisone (CORTEF) tablet 30 mg, 30 mg, Oral, Nightly  metoprolol tartrate (LOPRESSOR) tablet 25 mg, 25 mg, Oral, BID  0.9 % sodium chloride infusion 250 mL, 250 mL, Intravenous, Once  dextrose 5 % and 0.45 % sodium chloride infusion, , Intravenous, Continuous  guaiFENesin (MUCINEX) extended release tablet 600 mg, 600 mg, Oral, BID PRN  insulin glargine (LANTUS;BASAGLAR) injection pen 10 Units, 10 Units, Subcutaneous, Nightly  meropenem (MERREM) 1 g in sodium chloride 0.9 % 100 mL IVPB (mini-bag), 1 g, Intravenous, Q8H  magnesium hydroxide (MILK OF MAGNESIA) 400 MG/5ML suspension 30 mL, 30 mL, Oral, Daily PRN  [Held by provider] polyethylene glycol (GLYCOLAX) packet 17 g, 17 g, Oral, Daily  bisacodyl (DULCOLAX) suppository 10 mg, 10 mg, Rectal, Daily PRN  carbidopa-levodopa (SINEMET)  MG per tablet 1 tablet, 1 tablet, Oral, TID  vitamin B-12 (CYANOCOBALAMIN) tablet 1,000 mcg, 1,000 mcg, Oral, Daily  digoxin (LANOXIN) tablet 125 mcg, 125 mcg, Oral, Daily  docusate sodium (COLACE) capsule 100 mg, 100 mg, Oral, BID  linaclotide (LINZESS) capsule 145 mcg, 145 mcg, Oral, QAM AC  mupirocin (BACTROBAN) 2 % ointment, , Topical, Daily  tamsulosin (FLOMAX) capsule 0.4 mg, 0.4 mg, Oral, Nightly  traZODone (DESYREL) tablet 200 mg, 200 mg, Oral, Nightly  lamoTRIgine (LAMICTAL) tablet 25 mg, 25 mg, Oral, BID  atorvastatin (LIPITOR) tablet 20 mg, 20 mg, Oral, Nightly  sodium chloride flush 0.9 % injection 10 mL, 10 mL, Intravenous, 2 times per day  sodium chloride flush 0.9 % injection 10 mL, 10 mL, Intravenous, PRN  acetaminophen (TYLENOL) tablet 650 mg, 650 mg, Oral, Q6H PRN **OR** acetaminophen (TYLENOL) suppository 650 mg, 650 mg, Rectal, Q6H PRN  polyethylene glycol (GLYCOLAX) packet 17 g, 17 g, Oral, Daily PRN  promethazine (PHENERGAN) tablet 12.5 mg, 12.5 mg, Oral, Q6H PRN **OR** ondansetron (ZOFRAN) injection 4 mg, 4 mg, Intravenous, Q6H PRN  LORazepam (ATIVAN) tablet 0.5 mg, 0.5 mg, Oral, Nightly PRN  glucose (GLUTOSE) 40 % oral gel 15 g, 15 g, Oral, PRN  dextrose 50 % IV solution, 12.5 g, Intravenous, PRN  glucagon (rDNA) injection 1 mg, 1 mg, Intramuscular, PRN  dextrose 5 % solution, 100 mL/hr, Intravenous, PRN  hydrocortisone (CORTEF) tablet 30 mg, 30 mg, Oral, Daily  Allergies:  Ramipril; Gabapentin; and Morphine    Social History:      Social History     Socioeconomic History    Marital status:      Spouse name: Not on file    Number of children: Not on file    Years of education: Not on file    Highest education level: Not on file   Occupational History    Not on file   Social Needs    Financial resource strain: Not on file    Food insecurity     Worry: Not on file     Inability: Not on file    Transportation needs     Medical: Not on file     Non-medical: Not on file   Tobacco Use    Smoking status: Never Smoker    Smokeless tobacco: Never Used   Substance and Sexual Activity    Alcohol use: No    Drug use: Never    Sexual activity: Never   Lifestyle    Physical activity     Days per week: Not on file     Minutes per session: Not on file    Stress: Not on file   Relationships    Social connections     Talks on phone: Not on file     Gets together: Not on file     Attends Congregational service: Not on file     Active member of club or organization: Not on file     Attends meetings of clubs or organizations: Not on file     Relationship status: Not on file    Intimate partner violence     Fear of current or ex partner: Not on file     Emotionally abused: Not on file     Physically abused: Not on file     Forced sexual activity: Not on file   Other Topics Concern    Not on file   Social History Narrative    Not on file          Family History:         Problem Relation Age of Onset    Cancer Mother     Cancer Sister      REVIEW OF SYSTEMS:      · Constitutional: Denies fever, sweats, weight loss. .     · Eyes: No visual changes or diplopia. No scleral icterus. · ENT: No Headaches, hearing loss or vertigo. No mouth sores or sore throat. · Cardiovascular: No chest pain, dyspnea on exertion, palpitations or loss of consciousness. · Respiratory: No cough or wheezing, no sputum production. No hemoptysis. .    · Gastrointestinal: No abdominal pain, appetite loss, blood in stools. No change in bowel habits. · Genitourinary: No dysuria, trouble voiding, or hematuria. · Musculoskeletal:  Generalized weakness.  No joint Motor strength is 5 out of 5 all extremities bilaterally. NEUROLOGIC:  Awake, alert, oriented to name, place and time. Cranial nerves II-XII are grossly intact. Motor is 5 out of 5 bilaterally. SKIN:  no bruising or bleeding      DATA:    PT/INR:    Recent Labs     11/23/20  0534  11/24/20  0427 11/24/20  1351 11/24/20  1712 11/25/20  0521   PROT 6.4  --  6.3*  --   --  5.8*   INR 2.87*   < > 3.14* 3.30* 3.28* 3.67*    < > = values in this interval not displayed. PTT:  No results for input(s): APTT in the last 72 hours. CMP:    Lab Results   Component Value Date     11/25/2020    K 5.2 11/25/2020    K 5.0 11/24/2020    CL 98 11/25/2020    CO2 21 11/25/2020    BUN 45 11/25/2020    PROT 5.8 11/25/2020     Magnesium:    Lab Results   Component Value Date    MG 2.10 11/25/2020     Phosphorus:  No components found for: PO4  Calcium:  No components found for: CA  CBC:    Lab Results   Component Value Date    WBC 9.8 11/25/2020    RBC 3.75 11/25/2020    HGB 12.2 11/25/2020    HCT 37.7 11/25/2020    .5 11/25/2020    RDW 20.6 11/25/2020    PLT 68 11/25/2020     DIFF:    Lab Results   Component Value Date    .5 11/25/2020    RDW 20.6 11/25/2020        IMPRESSION/RECOMMENDATIONS:  1. Coagulopathy:  He clearly has a severe coagulopathy with an INR of 3.1 and mild thrombocytopenia. Since he has been off anticoagulants I believe this is likely due to sepsis and DIC from his gallbladder. Obviously the ultimate answer to fixing that problem will be to solve his gallbladder problem. In order to enable that I would give vitamin K 5 mg subcu and begin transfusing FFP to try to get his INR into a reasonable range to allow for percutaneous cholecystotomy. I have discussed this plan with Dr. Chantal Echeverria and he will put those orders into place.   There will need to be a careful balancing of his fluid volume in light of his history of congestive heart failure and with an prosthetic valve he will need to get back on some form of anticoagulation once it is deemed safe. 11/25/20:  Coagulopathy appears to be a combination of hepatic dysfunction and DIC due to sepsis. Best answer is correction of underlying disorders as he is not producing factors adequately and is consuming those he is producing. Overcoming this with FFP very difficult especially in setting of CHF. He is not actively bleeding so no emergent need to correct although need for cholecystotomy is certainly urgent. Art Mendenhall.  Aaron Noyola, [address]

## 2020-11-25 NOTE — PROCEDURES
cholecystitis, fatty infiltration of  the liver, no pancreatitis or ductal dilatation. White blood cell count  12,300; hemoglobin 11.7. Lactate 9. Total bilirubin 3.6, AST 2958, ALT  461, alkaline phosphatase normal.    IMPRESSION AND PLAN:  Acute hepatitis most likely due to a combination  of acute cholecystitis as well as phosphate element of hepatic ischemia  due to septicemia. Lactate level is 9. Gentle but appropriate and  consistent fluid rehydration. Maintain the euvolemic state, and  parenteral antibiotics to treat underlying infection, correction of INR  due to coagulopathy secondary to septicemia as recommended by Hematology  and Oncology in the event if the patient requires any therapeutic  intervention. Seems to be resting more comfortably this evening. IV  antibiotics. Further management regarding cholecystitis under the  direction of the surgical staff. If the patient requires imaging for  colon duct stone, either MRCP or ERCP, the latter if in any event the  patient has any implantable cardiac devices, which are none reported at  this point. We will follow at this point.         Monica Kent MD    D: 11/24/2020 18:38:18       T: 11/25/2020 0:15:35     [email or web address]  Job#: [id number]     Doc#: [id number]    CC:  Rusty Bloch, MD

## 2020-11-25 NOTE — PROGRESS NOTES
Cardiology Consult Service  Daily Progress Note        Admit Date:  11/22/2020  Primary cardiologist: Dr Rachel Noriega, Dr Josefa Rivera at [place] [address]    Reason for Consultation/Chief Complaint: HF management perioperatively. Subjective:     Adolfo Natarajan a 80 y. o. male with a past medical history of chronic atrial fibrillation on Eliquis for years, CAD status post CABG x2 in 1991 and then 8 stents (lastly 5 years ago), severe AS s/p T[facility]R in 10/2016 at [facility], single-lead ICD, HFrEF (EF 20-25%). He also has DM, Parkinson's disease, TIA's.      Echo 07/2019 at [place] [address]: Mild LV dilation, EF 20 to 25%, normal bioprosthetic aVR, moderate MR, RV dysfunction, moderate TR, bubble study negative.     Presented to the emergency room on 11/22/2020 with right upper quadrant abdominal pain. He was diagnosed with acute cholecystitis, sepsis. Cardiology was consulted for his heart failure and likely perioperative course. Patient remains afebrile, hemodynamically stable, mildly tachy, normal oxygen saturation on room air, on normal saline at 100 mL/h. Creatinine increasing at 1.3 from 0.8, lactic acid increasing, now at 9, LFTs and bilirubin increasing, WBC increasing, platelets low at 80. Hematology was consulted for the thrombocytopenia and it was thought to be due to DIC/sepsis. Plan includes possible percutaneous cholecystotomy tube.     ECG: A. fib, rate 80, left bundle branch block. Interval history: There were no overnight events. Patient reports no symptoms. He is off supplemental oxygen. LFTs improving, creatinine 1.1, INR still elevated at 3.6.     Objective:     Medications:   hydrocortisone  30 mg Oral Nightly    metoprolol tartrate  25 mg Oral BID    0.9 % sodium chloride  250 mL Intravenous Once    insulin glargine  10 Units Subcutaneous Nightly    meropenem  1 g Intravenous Q8H    [Held by provider] polyethylene glycol  17 g Oral Daily    carbidopa-levodopa  1 tablet Oral TID    cyanocobalamin  1,000 mcg Oral Daily    digoxin  125 mcg Oral Daily    docusate sodium  100 mg Oral BID    linaclotide  145 mcg Oral QAM AC    mupirocin   Topical Daily    tamsulosin  0.4 mg Oral Nightly    traZODone  200 mg Oral Nightly    lamoTRIgine  25 mg Oral BID    atorvastatin  20 mg Oral Nightly    sodium chloride flush  10 mL Intravenous 2 times per day    hydrocortisone  30 mg Oral Daily       IV drips:   dextrose 5 % and 0.9 % NaCl 100 mL/hr at 11/25/20 1000    dextrose         PRN:  guaiFENesin, magnesium hydroxide, bisacodyl, sodium chloride flush, acetaminophen **OR** acetaminophen, polyethylene glycol, promethazine **OR** ondansetron, LORazepam, glucose, dextrose, glucagon (rDNA), dextrose    Vitals:    11/24/20 2226 11/25/20 0020 11/25/20 0422 11/25/20 0829   BP: 131/85 106/70 125/78 135/82   Pulse: 80 72 81 68   Resp: 20 18 18 20   Temp: 96.7 °F (35.9 °C) 97 °F (36.1 °C) 96.4 °F (35.8 °C) 96.5 °F (35.8 °C)   TempSrc: Oral Oral Oral Oral   SpO2: 99% 93% 99% 97%   Weight:       Height:           Intake/Output Summary (Last 24 hours) at 11/25/2020 1055  Last data filed at 11/25/2020 0956  Gross per 24 hour   Intake 1215.28 ml   Output 1325 ml   Net -109.72 ml     I/O last 3 completed shifts:   In: 1275.3 [P.O.:60; I.V.:1215.3]  Out: 5025 [Urine:1375]  Wt Readings from Last 3 Encounters:   11/22/20 223 lb 8.7 oz (101.4 kg)   10/20/20 217 lb 13 oz (98.8 kg)   07/26/20 202 lb (91.6 kg)       Admit Wt: Weight: 220 lb (99.8 kg)   Todays Wt: Weight: 223 lb 8.7 oz (101.4 kg)    TELEMETRY: Atrial fibrillation with CVR    Physical Exam:         General Appearance:  Alert, cooperative, no distress, appears stated age Appropriate weight   Head:  Normocephalic, without obvious abnormality, atraumatic   Eyes:  PERRL, conjunctiva/corneas clear EOM intact  Ears normal   Throat no lesions       Nose: Nares normal, no drainage or sinus tenderness   Throat: Lips, mucosa, and tongue normal   Neck: Supple, symmetrical, trachea midline, no adenopathy, thyroid: not enlarged, symmetric, no tenderness/mass/nodules, no carotid bruit. Lungs:   Normal respiratory rate, lungs clear to auscultation without any wheezes, rubs or ronchi bilaterally. Chest Wall:  No tenderness or deformity   Heart:  Irregular rhythm, rate is controlled, S1, S2 normal, there is no murmur, there is no rub or gallop, no jvd, no bilateral lower extremity edema   Abdomen:   Soft, non-tender, bowel sounds active all four quadrants,  no masses, no organomegaly       Extremities: Extremities normal, atraumatic, no cyanosis. Pulses: 2+ and symmetric   Skin: Skin color, texture, turgor normal, no rashes or lesions   Pysch: Normal mood and affect   Neurologic: Normal gross motor and sensory exam.  Cranial nerves intact       Labs:   Recent Labs     11/23/20  0534 11/24/20  0427 11/24/20  0938 11/24/20  1712 11/25/20  0521   * 134* 136 135* 135*   K 5.0 5.2* 4.7 5.0 5.2*   BUN 25* 36* 36* 40* 45*   CREATININE 1.0 1.3 1.2 1.2 1.1   CL 99 96* 96* 97* 98*   CO2 12* 17* 18* 19* 21   GLUCOSE 136* 85 71 69* 209*   CALCIUM 9.2 9.6 9.4 9.5 9.1   MG 1.70* 2.00  --   --  2.10     Recent Labs     11/24/20  0427 11/24/20  1712 11/25/20  0521   WBC 14.0* 12.3* 9.8   HGB 11.2* 11.7* 12.2*   HCT 34.1* 36.4* 37.7*   PLT 80* 70* 68*   MCV 97.7 98.6 100.5*     No results for input(s): CHOLTOT, TRIG, HDL in the last 72 hours. Invalid input(s): LIPIDCOMM, CHOLHDL, VLDCHOL, LDL  Recent Labs     11/24/20  1351 11/24/20  1712 11/25/20  0521   INR 3.30* 3.28* 3.67*     Recent Labs     11/22/20  1131   TROPONINI 0.03*     No results for input(s): BNP in the last 72 hours. No results for input(s): NTPROBNP in the last 72 hours. No results for input(s): TSH in the last 72 hours.     Imaging:       Assessment & Plan:     1.  AF with RVR:  Patient has chronic AF with rate controlled on BB and on AC.      -Cw Lopressor to 25 mg p.o. twice daily (once hemodynamically stable postoperatively, could change to Toprol). -Cw dig low dose daily  -Holding anticoagulation for possible surgery.     2.  CAD status post CABG and stents:  Patient is asymptomatic.  His ECG is nondiagnostic due to IVCD/LBBB. There is no evidence of ACS.    -Post-op could consider asa low dose and statin. -C/w BB     3.  Chronic HFrEF:   Patient has severe biventricular systolic and diastolic heart failure.  He remains euvolemic and hemodynamically stable.     -Cw BB, dig.   -Unable to start ACEI/ARB/spironolactone or Entresto due to CKD and sepsis. -Kasandra Harper has an ICD.     4. Sepsis:   Due to acute desi. Will need surgical approach.      -Agree with IV fluids at this time to maintain BP  -- Strict I's and O's every shift and standing weights if possible, low-salt diet and daily BMP. -Close monitoring of oxygen sats; if decreased, will need to stop IV fluids and/or give a dose of lasix IV.  -His periop risk for CV complications is intermediate and no further testing is necessary. I have spent 30 minutes of face to face time with the patient with more than 50% spent counseling and coordinating care. I have personally reviewed the reports and images of labs, radiological studies, cardiac studies including ECG's and telemetry, current and old medical records. The note was completed using EMR and Dragon dictation system. Every effort was made to ensure accuracy; however, inadvertent computerized transcription errors may be present. All questions and concerns were addressed to the patient/family. Alternatives to my treatment were discussed. Thank you for allowing to us to participate in the [facility] or [facility]. Please call our service with questions.     Daniel Casey MD, [facility], [address]  The [address] [address]  Ph: [phone]  Fax: [phone]

## 2020-11-25 NOTE — CARE COORDINATION
Patient recently talked to palliative care and changed code status to [facility]. Primary dx: Cholecystitis, but other factors are complicating progress. Patient has coagulopathy at this time and gall bladder infection with CHF, Parkinson's, hepatic dysfunction. Patient being followed by Hem/Onc, GI, Palli, [facility]. Patient is suffering from  Pt is [facility] with his wife. Pt also has private duty aides from [address] [place] [address] to [address] [facility] Pt may need perc desi. Pt will need PT/OT evals after procedure to assist with appropriate DC plan and needs. Patient was living at [address] [facility] with his wife. He was getting home care from [address] [facility]. He had Private Duty Aide from 8pm to 8 am. CM awaiting medical improvement and PT/OT evals to know haw to procede with discharge plan. CM will continue to follow patient until discharge.   Electronically signed by Karina Ma RN on 11/25/2020 at 11:00 AM

## 2020-11-25 NOTE — PROGRESS NOTES
Still abdominal pain. LFTs improving with treatment for Cholecystitis and sepsis. ERCP only if cannot have non invasive MRCP and liver test do not normalized.

## 2020-11-25 NOTE — PLAN OF CARE
Problem: Skin Integrity:  Goal: Will show no infection signs and symptoms  Description: Will show no infection signs and symptoms  Outcome: Ongoing  Note: Pt with redness to the buttocks- applied zinc cream- will continue to monitor. Problem: Skin Integrity:  Goal: Absence of new skin breakdown  Description: Absence of new skin breakdown  Outcome: Ongoing  Note: No new skin breakdown noted. Problem: Falls - Risk of:  Goal: Will remain free from falls  Description: Will remain free from falls  Outcome: Ongoing  Note: Discussed with pt importance to keep bed low and locked with alarm activated, nonskid socks on when out of bed, call light and belongings within reach- will monitor. Problem: Falls - Risk of:  Goal: Absence of physical injury  Description: Absence of physical injury  Outcome: Ongoing  Note: No physical injury noted- will monitor. Problem: Pain:  Goal: Pain level will decrease  Description: Pain level will decrease  Outcome: Ongoing  Note: Pt denies pain- will monitor. Problem: Confusion - Acute:  Goal: Absence of continued neurological deterioration signs and symptoms  Description: Absence of continued neurological deterioration signs and symptoms  Outcome: Ongoing  Note: Pt has not had any confusion today, but he has been rather sleepy, but easily arousable. Problem: Injury - Risk of, Physical Injury:  Goal: Will remain free from falls  Description: Will remain free from falls  Outcome: Ongoing  Note: Discussed with pt importance to keep bed low and locked with alarm activated, nonskid socks on when out of bed, call light and belongings within reach- will monitor. Problem: Injury - Risk of, Physical Injury:  Goal: Absence of physical injury  Description: Absence of physical injury  Outcome: Ongoing  Note: No physical injury noted- will monitor.      Problem: Mood - Altered:  Goal: Mood stable  Description: Mood stable  Outcome: Ongoing  Note: Pt's mood has been stable today- pt states that he feels a little blue because he wants to go home soon.

## 2020-11-26 PROBLEM — K81.9 CHOLECYSTITIS: Status: ACTIVE | Noted: 2020-11-22

## 2020-11-26 LAB
ALBUMIN SERPL-MCNC: 3.5 G/DL (ref 3.4–5)
ALBUMIN SERPL-MCNC: 3.9 G/DL (ref 3.4–5)
ALP BLD-CCNC: 125 U/L (ref 40–129)
ALT SERPL-CCNC: 313 U/L (ref 10–40)
ANION GAP SERPL CALCULATED.3IONS-SCNC: 16 MMOL/L (ref 3–16)
AST SERPL-CCNC: 1017 U/L (ref 15–37)
BASOPHILS ABSOLUTE: 0 K/UL (ref 0–0.2)
BASOPHILS RELATIVE PERCENT: 0.1 %
BILIRUB SERPL-MCNC: 2.7 MG/DL (ref 0–1)
BILIRUBIN DIRECT: 1.4 MG/DL (ref 0–0.3)
BILIRUBIN, INDIRECT: 1.3 MG/DL (ref 0–1)
BUN BLDV-MCNC: 44 MG/DL (ref 7–20)
CALCIUM SERPL-MCNC: 8.8 MG/DL (ref 8.3–10.6)
CHLORIDE BLD-SCNC: 99 MMOL/L (ref 99–110)
CO2: 20 MMOL/L (ref 21–32)
CREAT SERPL-MCNC: 1.1 MG/DL (ref 0.8–1.3)
EOSINOPHILS ABSOLUTE: 0 K/UL (ref 0–0.6)
EOSINOPHILS RELATIVE PERCENT: 0.1 %
GFR AFRICAN AMERICAN: >60
GFR NON-AFRICAN AMERICAN: >60
GLUCOSE BLD-MCNC: 240 MG/DL (ref 70–99)
GLUCOSE BLD-MCNC: 263 MG/DL (ref 70–99)
GLUCOSE BLD-MCNC: 303 MG/DL (ref 70–99)
GLUCOSE BLD-MCNC: 310 MG/DL (ref 70–99)
HCT VFR BLD CALC: 36.7 % (ref 40.5–52.5)
HEMOGLOBIN: 11.8 G/DL (ref 13.5–17.5)
INR BLD: 3.09 (ref 0.86–1.14)
LYMPHOCYTES ABSOLUTE: 0.4 K/UL (ref 1–5.1)
LYMPHOCYTES RELATIVE PERCENT: 4.6 %
MAGNESIUM: 2 MG/DL (ref 1.8–2.4)
MCH RBC QN AUTO: 32.1 PG (ref 26–34)
MCHC RBC AUTO-ENTMCNC: 32.2 G/DL (ref 31–36)
MCV RBC AUTO: 99.8 FL (ref 80–100)
MONOCYTES ABSOLUTE: 0.6 K/UL (ref 0–1.3)
MONOCYTES RELATIVE PERCENT: 7 %
NEUTROPHILS ABSOLUTE: 7.6 K/UL (ref 1.7–7.7)
NEUTROPHILS RELATIVE PERCENT: 88.2 %
PDW BLD-RTO: 19.7 % (ref 12.4–15.4)
PERFORMED ON: ABNORMAL
PHOSPHORUS: 2.2 MG/DL (ref 2.5–4.9)
PLATELET # BLD: 65 K/UL (ref 135–450)
PMV BLD AUTO: 8.9 FL (ref 5–10.5)
POTASSIUM SERPL-SCNC: 4.3 MMOL/L (ref 3.5–5.1)
PROTHROMBIN TIME: 36.3 SEC (ref 10–13.2)
RBC # BLD: 3.68 M/UL (ref 4.2–5.9)
SODIUM BLD-SCNC: 135 MMOL/L (ref 136–145)
TOTAL PROTEIN: 5.7 G/DL (ref 6.4–8.2)
WBC # BLD: 8.7 K/UL (ref 4–11)

## 2020-11-26 PROCEDURE — 2580000003 HC RX 258: Performed by: INTERNAL MEDICINE

## 2020-11-26 PROCEDURE — 85025 COMPLETE CBC W/AUTO DIFF WBC: CPT

## 2020-11-26 PROCEDURE — 6360000002 HC RX W HCPCS: Performed by: STUDENT IN AN ORGANIZED HEALTH CARE EDUCATION/TRAINING PROGRAM

## 2020-11-26 PROCEDURE — 6370000000 HC RX 637 (ALT 250 FOR IP): Performed by: INTERNAL MEDICINE

## 2020-11-26 PROCEDURE — 80069 RENAL FUNCTION PANEL: CPT

## 2020-11-26 PROCEDURE — 85610 PROTHROMBIN TIME: CPT

## 2020-11-26 PROCEDURE — 1200000000 HC SEMI PRIVATE

## 2020-11-26 PROCEDURE — 2580000003 HC RX 258: Performed by: STUDENT IN AN ORGANIZED HEALTH CARE EDUCATION/TRAINING PROGRAM

## 2020-11-26 PROCEDURE — 99232 SBSQ HOSP IP/OBS MODERATE 35: CPT | Performed by: INTERNAL MEDICINE

## 2020-11-26 PROCEDURE — 83735 ASSAY OF MAGNESIUM: CPT

## 2020-11-26 PROCEDURE — 99232 SBSQ HOSP IP/OBS MODERATE 35: CPT | Performed by: SURGERY

## 2020-11-26 PROCEDURE — 36415 COLL VENOUS BLD VENIPUNCTURE: CPT

## 2020-11-26 PROCEDURE — 80076 HEPATIC FUNCTION PANEL: CPT

## 2020-11-26 RX ORDER — SODIUM CHLORIDE 9 MG/ML
INJECTION, SOLUTION INTRAVENOUS CONTINUOUS
Status: DISCONTINUED | OUTPATIENT
Start: 2020-11-26 | End: 2020-11-28

## 2020-11-26 RX ADMIN — METOPROLOL TARTRATE 25 MG: 25 TABLET, FILM COATED ORAL at 22:16

## 2020-11-26 RX ADMIN — PHYTONADIONE 5 MG: 10 INJECTION, EMULSION INTRAMUSCULAR; INTRAVENOUS; SUBCUTANEOUS at 10:06

## 2020-11-26 RX ADMIN — LAMOTRIGINE 25 MG: 25 TABLET ORAL at 06:34

## 2020-11-26 RX ADMIN — CYANOCOBALAMIN TAB 1000 MCG 1000 MCG: 1000 TAB at 09:37

## 2020-11-26 RX ADMIN — Medication 10 ML: at 09:37

## 2020-11-26 RX ADMIN — CARBIDOPA AND LEVODOPA 1 TABLET: 25; 100 TABLET ORAL at 22:16

## 2020-11-26 RX ADMIN — SODIUM CHLORIDE: 900 INJECTION INTRAVENOUS at 07:46

## 2020-11-26 RX ADMIN — DOCUSATE SODIUM 100 MG: 100 CAPSULE, LIQUID FILLED ORAL at 09:37

## 2020-11-26 RX ADMIN — LORAZEPAM 0.5 MG: 0.5 TABLET ORAL at 22:16

## 2020-11-26 RX ADMIN — MUPIROCIN: 20 OINTMENT TOPICAL at 09:37

## 2020-11-26 RX ADMIN — MEROPENEM 1 G: 1 INJECTION, POWDER, FOR SOLUTION INTRAVENOUS at 22:20

## 2020-11-26 RX ADMIN — TRAZODONE HYDROCHLORIDE 200 MG: 100 TABLET ORAL at 22:16

## 2020-11-26 RX ADMIN — MEROPENEM 1 G: 1 INJECTION, POWDER, FOR SOLUTION INTRAVENOUS at 05:24

## 2020-11-26 RX ADMIN — CARBIDOPA AND LEVODOPA 1 TABLET: 25; 100 TABLET ORAL at 09:36

## 2020-11-26 RX ADMIN — SODIUM CHLORIDE: 900 INJECTION INTRAVENOUS at 19:14

## 2020-11-26 RX ADMIN — DEXTROSE AND SODIUM CHLORIDE: 5; 900 INJECTION, SOLUTION INTRAVENOUS at 06:29

## 2020-11-26 RX ADMIN — METOPROLOL TARTRATE 25 MG: 25 TABLET, FILM COATED ORAL at 09:36

## 2020-11-26 RX ADMIN — DOCUSATE SODIUM 100 MG: 100 CAPSULE, LIQUID FILLED ORAL at 22:16

## 2020-11-26 RX ADMIN — HYDROCORTISONE 30 MG: 20 TABLET ORAL at 22:19

## 2020-11-26 RX ADMIN — DIGOXIN 125 MCG: 125 TABLET ORAL at 09:36

## 2020-11-26 RX ADMIN — LAMOTRIGINE 25 MG: 25 TABLET ORAL at 15:44

## 2020-11-26 RX ADMIN — ATORVASTATIN CALCIUM 20 MG: 20 TABLET, FILM COATED ORAL at 22:16

## 2020-11-26 RX ADMIN — CARBIDOPA AND LEVODOPA 1 TABLET: 25; 100 TABLET ORAL at 11:56

## 2020-11-26 RX ADMIN — MEROPENEM 1 G: 1 INJECTION, POWDER, FOR SOLUTION INTRAVENOUS at 14:24

## 2020-11-26 RX ADMIN — HYDROCORTISONE 30 MG: 20 TABLET ORAL at 09:37

## 2020-11-26 RX ADMIN — Medication 10 ML: at 22:17

## 2020-11-26 RX ADMIN — TAMSULOSIN HYDROCHLORIDE 0.4 MG: 0.4 CAPSULE ORAL at 22:16

## 2020-11-26 ASSESSMENT — PAIN SCALES - PAIN ASSESSMENT IN ADVANCED DEMENTIA (PAINAD)
CONSOLABILITY: 0
FACIALEXPRESSION: 0
NEGVOCALIZATION: 0
TOTALSCORE: 0
NEGVOCALIZATION: 0
CONSOLABILITY: 0
TOTALSCORE: 0
BODYLANGUAGE: 0
BODYLANGUAGE: 0
FACIALEXPRESSION: 0
BREATHING: 0
BREATHING: 0

## 2020-11-26 ASSESSMENT — PAIN SCALES - GENERAL
PAINLEVEL_OUTOF10: 0

## 2020-11-26 NOTE — PROGRESS NOTES
[facility] 81   Cardiology Progress Note     Admit Date: 2020     Reason for follow up: HF management perioperatively    HPI and Interval History:   Patient seen and examined. Clinical notes reviewed. No major events overnight. Pt reports no symptoms however appears confused and wishes to go home. He continues to remain off supplemental oxygen. LFTs improving, Cr 1.1, INR downtrending but still elevated at 3.09. Review of System:  All other systems reviewed except for that noted above. Physical Examination:  Vitals:    20 1148   BP: 121/87   Pulse: 70   Resp: 24   Temp: 95.4 °F (35.2 °C)   SpO2: 98%        Intake/Output Summary (Last 24 hours) at 2020 1158  Last data filed at 2020 3510  Gross per 24 hour   Intake 2750.49 ml   Output 875 ml   Net 1875.49 ml     In: 2700.5 [P.O.:240; I.V.:2460.5]  Out: 600    Wt Readings from Last 3 Encounters:   20 223 lb 8.7 oz (101.4 kg)   10/20/20 217 lb 13 oz (98.8 kg)   20 202 lb (91.6 kg)     Temp  Av.7 °F (35.9 °C)  Min: 95.4 °F (35.2 °C)  Max: 97.7 °F (36.5 °C)  Pulse  Av.7  Min: 68  Max: 87  BP  Min: 121/87  Max: 156/93  SpO2  Av.1 %  Min: 92 %  Max: 98 %    General Appearance:  Alert, cooperative, no distress, appears stated age Appropriate weight   Head:  Normocephalic, without obvious abnormality, atraumatic   Eyes:  PERRL, conjunctiva/corneas clear EOM intact  Ears normal   Throat no lesions         Nose: Nares normal, no drainage or sinus tenderness   Throat: Lips, mucosa, and tongue normal   Neck: Supple, symmetrical, trachea midline, no adenopathy, thyroid: not enlarged, symmetric, no tenderness/mass/nodules, no carotid bruit.          Lungs:   Normal respiratory rate, lungs clear to auscultation without any wheezes, rubs or ronchi bilaterally.     Chest Wall:  No tenderness or deformity   Heart:  Irregular rhythm, rate is controlled, S1, S2 normal, there is no murmur, there is no rub or gallop, no jvd, no bilateral lower extremity edema   Abdomen:   Soft, non-tender, bowel sounds active all four quadrants,  no masses, no organomegaly         Extremities: Extremities normal, atraumatic, no cyanosis. Pulses: 2+ and symmetric   Skin: Skin color, texture, turgor normal, no rashes or lesions   Pysch: Normal mood and affect   Neurologic: Normal gross motor and sensory exam.  Cranial nerves intact     ·       Labs, diagnostic and imaging results reviewed. Reviewed. Recent Labs     11/24/20  0938 11/24/20 1712 11/25/20 0521 11/26/20  0621    135* 135* 135*   K 4.7 5.0 5.2* 4.3   CL 96* 97* 98* 99   CO2 18* 19* 21 20*   PHOS 4.8  --  3.9 2.2*   BUN 36* 40* 45* 44*   CREATININE 1.2 1.2 1.1 1.1     Recent Labs     11/24/20 1712 11/25/20 0521 11/26/20 0621   WBC 12.3* 9.8 8.7   HGB 11.7* 12.2* 11.8*   HCT 36.4* 37.7* 36.7*   MCV 98.6 100.5* 99.8   PLT 70* 68* 65*     Lab Results   Component Value Date    TROPONINI 0.03 11/22/2020     Estimated Creatinine Clearance: 59 mL/min (based on SCr of 1.1 mg/dL).    No results found for: BNP  Lab Results   Component Value Date    PROTIME 36.3 11/26/2020    PROTIME 43.1 11/25/2020    PROTIME 38.5 11/24/2020    INR 3.09 11/26/2020    INR 3.67 11/25/2020    INR 3.28 11/24/2020     Lab Results   Component Value Date    CHOL 93 08/23/2019    HDL 37 08/23/2019    TRIG 76 08/23/2019       Scheduled Meds:   hydrocortisone  30 mg Oral Nightly    metoprolol tartrate  25 mg Oral BID    insulin glargine  10 Units Subcutaneous Nightly    meropenem  1 g Intravenous Q8H    [Held by provider] polyethylene glycol  17 g Oral Daily    carbidopa-levodopa  1 tablet Oral TID    cyanocobalamin  1,000 mcg Oral Daily    digoxin  125 mcg Oral Daily    docusate sodium  100 mg Oral BID    linaclotide  145 mcg Oral QAM AC    mupirocin   Topical Daily    tamsulosin  0.4 mg Oral Nightly    traZODone  200 mg Oral Nightly    lamoTRIgine  25 mg Oral BID    atorvastatin  20 mg Oral Nightly    sodium chloride flush  10 mL Intravenous 2 times per day    hydrocortisone  30 mg Oral Daily     Continuous Infusions:   sodium chloride 100 mL/hr at 11/26/20 0746    dextrose       PRN Meds:guaiFENesin, magnesium hydroxide, bisacodyl, sodium chloride flush, acetaminophen **OR** acetaminophen, polyethylene glycol, promethazine **OR** ondansetron, LORazepam, glucose, dextrose, glucagon (rDNA), dextrose     Patient Active Problem List    Diagnosis Date Noted    Cholecystitis 11/22/2020    Acute on chronic congestive heart failure (HCC)     Acute CVA (cerebrovascular accident) ([facility] [address] 08/22/2019    Hyponatremia 08/22/2019    Atrial fibrillation ([facility] 08/22/2019    Chronic systolic heart failure ([facility] 08/22/2019    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 99/74/8219    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 09/19/2014      Active Hospital Problems    Diagnosis Date Noted    Cholecystitis [K81.9] 11/22/2020       Assessment and Plan:     1) Afib with RVR - rate controlled on BB, on digoxin, holding AC for possible surgery  2) CAD s/p CABG and stents - pt asymptomatic, no evidence of ACS, consider los dose ASA and statin post-op, continue BB  3) Chronic HFrEF s/p ICD - continue BB and digoxin, unable to start ACEI/ARB/spironolactone or entresto 2/2 CKD and sepsis  4) Sepsis 2/2 acute cholecystitis - requires surgery, GI and general surgery following, he is intermediate karol-operative risk for CV complications and no further testing necessary    Will discuss with attending Dr. Nitesh Arreola. Thank you for allowing me to participate in the care of this patient. If you have any questions, please do not hesitate to contact me. Gilles Bradshaw (PGY-2), MD     Attestation  I have seen,interviewed and examined the patient with the resident. Pertinent medical data and imaging studies reviewed.   Refer to the residents note for details of clinical findings  I agree with his assessment and plan

## 2020-11-26 NOTE — PROGRESS NOTES
[facility]    [facility]              [phone]                          Interval History and plan:      Urine output is 1 L. Blood pressure is stable    Continue hydrocortisone  30 mg p.o. twice daily. Worsening liver function is contributing to his severe lactic acidosis. Continue with IV fluid  Labs are pending from today. Patient may require cholecystectomy if possible otherwise perc Hortensia. Continue antibiotics  Monitor renal function daily   Change IV fluid normal saline and remove dextrose                   Assessment :     Severe anion gap metabolic acidosis with lactic acidosis:  Lactate corrected, evaluation is 9.0 with anion gap of 21. He also has mild hyperkalemia with hyponatremia. Blood pressure is stable. Urine output is not accurately measured is about 450 mL. He is afebrile. Acute cholecystitis:  He is on meropenem  Surgery is on the case. CT scan showed gallstones and pericholecystic fluid consistent with acute cholecystitis. Bilateral kidneys are small and simple cyst in the left kidney. Adrenal insufficiency:  He is on hydrocortisone which will be continued. Patient does have history of congestive heart failure with EF of 20 to 25%. [facility] would like to thank Navarro Luciano MD   for opportunity to serve this patient      Please call with questions at-   24 Hrs Answering service [phone] or  7 am- 5 pm via Perfect serve or cell phone  [address] [address]          CC/reason for consult :     Acidosis and hyperkalemia. HPI :     Nemo Garcia is a 80 y.o. male presented to   the hospital on 11/22/2020 with abdominal pain. He has past medical history of hypertension, hyperlipidemia and osteoarthritis. He presented with abdominal pain. He also history of Parkinson's disease. His pain is in the right upper quadrant for about 1 week. It is not associated with any nausea and vomiting.   He lives in a nursing facility and was told to come to the hospital.    He is now admitted for acute cholecystitis and is n.p.o. He was on IV fluid which was switched to lactated Ringer's. He is also placed on Zosyn. Surgery is consulted for percutaneous cholecystectomy. He has history of adrenal insufficiency and is on home steroids. I was called for further management of worsening lactic acidosis and hyperkalemia. ROS:     Seen with-student    positives in bold   Constitutional:  fever, chills, weakness, weight change, fatigue  Skin:  rash, pruritus, hair loss, bruising, dry skin, petechiae  Head, Face, Neck   headaches, swelling,  cervical adenopathy  Respiratory: shortness of breath, cough, or wheezing  Cardiovascular: chest pain, palpitations, dizzy, edema  Gastrointestinal: nausea, vomiting, diarrhea, constipation,belly pain    Yellow skin, blood in stool  Musculoskeletal:  back pain, muscle weakness, gait problems,       joint pain or swelling. Genitourinary:  dysuria, poor urine flow, flank pain, blood in urine  Neurologic:  vertigo, TIA'S, syncope, seizures, focal weakness  Psychosocial:  insomnia, anxiety, or depression. All other remaining systems are negative or unable to obtain        PMH/PSH/SH/Family History:     Past Medical History:   Diagnosis Date    Arthritis     Back pain     CAD (coronary artery disease)     Cancer (HCC)     prostate    Diabetes mellitus ([facility] [address]     Hyperlipidemia     Hypertension     Rosacea        Past Surgical History:   Procedure Laterality Date    BACK SURGERY      CATARACT REMOVAL WITH IMPLANT      COLONOSCOPY  9/21/11    COLONOSCOPY  2/11/2015    polyp, diverticulosis    CORONARY ANGIOPLASTY WITH STENT PLACEMENT      CORONARY ARTERY BYPASS GRAFT      EYE SURGERY      JOINT REPLACEMENT      knee    PROSTATE SURGERY      seed implants    SHOULDER SURGERY          reports that he has never smoked.  He has never used smokeless tobacco. He reports that he does not drink alcohol or use drugs. family history includes Cancer in his mother and sister.          Medication:     Current Facility-Administered Medications: dextrose 5 % and 0.9 % sodium chloride infusion, , Intravenous, Continuous  hydrocortisone (CORTEF) tablet 30 mg, 30 mg, Oral, Nightly  metoprolol tartrate (LOPRESSOR) tablet 25 mg, 25 mg, Oral, BID  guaiFENesin (MUCINEX) extended release tablet 600 mg, 600 mg, Oral, BID PRN  insulin glargine (LANTUS;BASAGLAR) injection pen 10 Units, 10 Units, Subcutaneous, Nightly  meropenem (MERREM) 1 g in sodium chloride 0.9 % 100 mL IVPB (mini-bag), 1 g, Intravenous, Q8H  magnesium hydroxide (MILK OF MAGNESIA) 400 MG/5ML suspension 30 mL, 30 mL, Oral, Daily PRN  [Held by provider] polyethylene glycol (GLYCOLAX) packet 17 g, 17 g, Oral, Daily  bisacodyl (DULCOLAX) suppository 10 mg, 10 mg, Rectal, Daily PRN  carbidopa-levodopa (SINEMET)  MG per tablet 1 tablet, 1 tablet, Oral, TID  vitamin B-12 (CYANOCOBALAMIN) tablet 1,000 mcg, 1,000 mcg, Oral, Daily  digoxin (LANOXIN) tablet 125 mcg, 125 mcg, Oral, Daily  docusate sodium (COLACE) capsule 100 mg, 100 mg, Oral, BID  linaclotide (LINZESS) capsule 145 mcg, 145 mcg, Oral, QAM AC  mupirocin (BACTROBAN) 2 % ointment, , Topical, Daily  tamsulosin (FLOMAX) capsule 0.4 mg, 0.4 mg, Oral, Nightly  traZODone (DESYREL) tablet 200 mg, 200 mg, Oral, Nightly  lamoTRIgine (LAMICTAL) tablet 25 mg, 25 mg, Oral, BID  atorvastatin (LIPITOR) tablet 20 mg, 20 mg, Oral, Nightly  sodium chloride flush 0.9 % injection 10 mL, 10 mL, Intravenous, 2 times per day  sodium chloride flush 0.9 % injection 10 mL, 10 mL, Intravenous, PRN  acetaminophen (TYLENOL) tablet 650 mg, 650 mg, Oral, Q6H PRN **OR** acetaminophen (TYLENOL) suppository 650 mg, 650 mg, Rectal, Q6H PRN  polyethylene glycol (GLYCOLAX) packet 17 g, 17 g, Oral, Daily PRN  promethazine (PHENERGAN) tablet 12.5 mg, 12.5 mg, Oral, Q6H PRN **OR** ondansetron (ZOFRAN) injection 4 mg, 4 mg, Intravenous, Q6H PRN  LORazepam (ATIVAN) tablet 0.5 mg, 0.5 mg, Oral, Nightly PRN  glucose (GLUTOSE) 40 % oral gel 15 g, 15 g, Oral, PRN  dextrose 50 % IV solution, 12.5 g, Intravenous, PRN  glucagon (rDNA) injection 1 mg, 1 mg, Intramuscular, PRN  dextrose 5 % solution, 100 mL/hr, Intravenous, PRN  hydrocortisone (CORTEF) tablet 30 mg, 30 mg, Oral, Daily       Vitals :     Vitals:    11/26/20 0525   BP: (!) 131/93   Pulse: 76   Resp: 18   Temp: 97.7 °F (36.5 °C)   SpO2: 95%       I & O :       Intake/Output Summary (Last 24 hours) at 11/26/2020 0709  Last data filed at 11/26/2020 5974  Gross per 24 hour   Intake 2800.49 ml   Output 1175 ml   Net 1625.49 ml        Physical Examination :     General appearance: He is moaning in pain. HEENT: Lips- normal, teeth- ok , oral mucosa- moist  Neck : Mass- no, appears symmetrical, JVD- not visible  Respiratory: Respiratory effort-okay, wheeze- no, crackles -none  Cardiovascular: Ausculation- No M/R/G, Edema +  Abdomen: visible mass- no, distention- no, scar- no, tenderness-yes in the right upper quadrant.                            hepatosplenomegaly-  no  Musculoskeletal:  clubbing no,cyanosis- no , digital ischemia- no                           muscle strength- grossly normal , tone - grossly normal  Skin: rashes- no , ulcers- no, induration- no, tightening - no  Psychiatric:  Judgement and insight- normal           AAO X 3       LABS:     Recent Labs     11/24/20 0427 11/24/20  1712 11/25/20  0521   WBC 14.0* 12.3* 9.8   HGB 11.2* 11.7* 12.2*   HCT 34.1* 36.4* 37.7*   PLT 80* 70* 68*     Recent Labs     11/24/20 0427 11/24/20  0938 11/24/20  1712 11/25/20  0521   * 136 135* 135*   K 5.2* 4.7 5.0 5.2*   CL 96* 96* 97* 98*   CO2 17* 18* 19* 21   BUN 36* 36* 40* 45*   CREATININE 1.3 1.2 1.2 1.1   GLUCOSE 85 71 69* 209*   MG 2.00  --   --  2.10   PHOS 5.0* 4.8  --  3.9      Nephrology  [address] [place] [address], [address] [facility]  Office: [phone]  Fax: [phone]

## 2020-11-26 NOTE — PROGRESS NOTES
Surgery Daily Progress Note      CC: Acute cholecystitis     Subjective :  Patient confused this AM- thinks he is having surgery this AM. Denies abdominal pain. Denies any nausea or vomiting. ROS: A 14 point review of systems was conducted, significant findings as noted above. All other systems negative. Objective     Exam:  Vitals:    11/25/20 1533 11/25/20 2130 11/25/20 2352 11/26/20 0525   BP: (!) 156/93 (!) 140/87 133/85 (!) 131/93   Pulse: 68 71 87 76   Resp: 22 18 18 18   Temp: 95.8 °F (35.4 °C) 96.8 °F (36 °C) 96.9 °F (36.1 °C) 97.7 °F (36.5 °C)   TempSrc: Oral Oral Oral Oral   SpO2: 94% 94% 96% 95%   Weight:       Height:         General appearance: alert, appears stated age and cooperative  Lungs: clear to auscultation bilaterally  Heart: Regular/ irregular HR  Abdomen: soft, minimally tender in RUQ; bowel sounds present, non distended      ASSESSMENT/PLAN:   This is a 80 y.o. male who presented with a 10 day hx of RUQ pain. CT abd shows cholelithiasis with gallbladder wall thickening and pericholecystic fluid consistent with actue cholecystitis. - Follow up am labs   - Patient needs better glycemic control in the setting of increased steroid and infection  - If INR is improved plan for percutaneous desi, there are no plans for surgery as this patient is high risk and likely would not survive general anesthesia   - Heme/onc states no additional recommendations for reversal of INR   - GI recommending ERCP. [facility] will need to contact [facility] in order to facilitate procedure due to the fact that they will not accept consult from general surgery service     Jessica Edwards DO  11/26/20  6:53 AM  [phone]    I have seen, examined, and reviewed the patients chart. I agree with the residents assessment and have made appropriate changes.     Kaushal Hollingsworth

## 2020-11-27 LAB
A/G RATIO: 1.7 (ref 1.1–2.2)
ABO/RH: NORMAL
ALBUMIN SERPL-MCNC: 3.8 G/DL (ref 3.4–5)
ALBUMIN SERPL-MCNC: 4 G/DL (ref 3.4–5)
ALP BLD-CCNC: 131 U/L (ref 40–129)
ALP BLD-CCNC: 137 U/L (ref 40–129)
ALT SERPL-CCNC: 424 U/L (ref 10–40)
ALT SERPL-CCNC: 487 U/L (ref 10–40)
AMMONIA: <10 UMOL/L (ref 16–60)
ANION GAP SERPL CALCULATED.3IONS-SCNC: 13 MMOL/L (ref 3–16)
ANION GAP SERPL CALCULATED.3IONS-SCNC: 17 MMOL/L (ref 3–16)
ANTIBODY SCREEN: NORMAL
AST SERPL-CCNC: 794 U/L (ref 15–37)
AST SERPL-CCNC: 874 U/L (ref 15–37)
BASOPHILS ABSOLUTE: 0 K/UL (ref 0–0.2)
BASOPHILS ABSOLUTE: 0 K/UL (ref 0–0.2)
BASOPHILS RELATIVE PERCENT: 0.1 %
BASOPHILS RELATIVE PERCENT: 0.3 %
BILIRUB SERPL-MCNC: 3.1 MG/DL (ref 0–1)
BILIRUB SERPL-MCNC: 3.4 MG/DL (ref 0–1)
BILIRUBIN DIRECT: 1.6 MG/DL (ref 0–0.3)
BILIRUBIN, INDIRECT: 1.5 MG/DL (ref 0–1)
BLOOD BANK DISPENSE STATUS: NORMAL
BLOOD BANK PRODUCT CODE: NORMAL
BPU ID: NORMAL
BUN BLDV-MCNC: 42 MG/DL (ref 7–20)
BUN BLDV-MCNC: 42 MG/DL (ref 7–20)
CALCIUM SERPL-MCNC: 9 MG/DL (ref 8.3–10.6)
CALCIUM SERPL-MCNC: 9.3 MG/DL (ref 8.3–10.6)
CHLORIDE BLD-SCNC: 103 MMOL/L (ref 99–110)
CHLORIDE BLD-SCNC: 103 MMOL/L (ref 99–110)
CO2: 20 MMOL/L (ref 21–32)
CO2: 21 MMOL/L (ref 21–32)
CREAT SERPL-MCNC: 0.9 MG/DL (ref 0.8–1.3)
CREAT SERPL-MCNC: 1 MG/DL (ref 0.8–1.3)
DESCRIPTION BLOOD BANK: NORMAL
EOSINOPHILS ABSOLUTE: 0 K/UL (ref 0–0.6)
EOSINOPHILS ABSOLUTE: 0 K/UL (ref 0–0.6)
EOSINOPHILS RELATIVE PERCENT: 0 %
EOSINOPHILS RELATIVE PERCENT: 0.1 %
GFR AFRICAN AMERICAN: >60
GFR AFRICAN AMERICAN: >60
GFR NON-AFRICAN AMERICAN: >60
GFR NON-AFRICAN AMERICAN: >60
GLOBULIN: 2.3 G/DL
GLUCOSE BLD-MCNC: 105 MG/DL (ref 70–99)
GLUCOSE BLD-MCNC: 113 MG/DL (ref 70–99)
GLUCOSE BLD-MCNC: 126 MG/DL (ref 70–99)
GLUCOSE BLD-MCNC: 138 MG/DL (ref 70–99)
GLUCOSE BLD-MCNC: 142 MG/DL (ref 70–99)
GLUCOSE BLD-MCNC: 170 MG/DL (ref 70–99)
HCT VFR BLD CALC: 36.6 % (ref 40.5–52.5)
HCT VFR BLD CALC: 38.6 % (ref 40.5–52.5)
HEMOGLOBIN: 11.8 G/DL (ref 13.5–17.5)
HEMOGLOBIN: 12.3 G/DL (ref 13.5–17.5)
INR BLD: 3.28 (ref 0.86–1.14)
LYMPHOCYTES ABSOLUTE: 0.6 K/UL (ref 1–5.1)
LYMPHOCYTES ABSOLUTE: 0.6 K/UL (ref 1–5.1)
LYMPHOCYTES RELATIVE PERCENT: 6.6 %
LYMPHOCYTES RELATIVE PERCENT: 7.3 %
MAGNESIUM: 2 MG/DL (ref 1.8–2.4)
MCH RBC QN AUTO: 32 PG (ref 26–34)
MCH RBC QN AUTO: 32.1 PG (ref 26–34)
MCHC RBC AUTO-ENTMCNC: 32 G/DL (ref 31–36)
MCHC RBC AUTO-ENTMCNC: 32.4 G/DL (ref 31–36)
MCV RBC AUTO: 100.2 FL (ref 80–100)
MCV RBC AUTO: 99.1 FL (ref 80–100)
MONOCYTES ABSOLUTE: 0.9 K/UL (ref 0–1.3)
MONOCYTES ABSOLUTE: 0.9 K/UL (ref 0–1.3)
MONOCYTES RELATIVE PERCENT: 10.3 %
MONOCYTES RELATIVE PERCENT: 11.2 %
NEUTROPHILS ABSOLUTE: 6.4 K/UL (ref 1.7–7.7)
NEUTROPHILS ABSOLUTE: 7.3 K/UL (ref 1.7–7.7)
NEUTROPHILS RELATIVE PERCENT: 81.4 %
NEUTROPHILS RELATIVE PERCENT: 82.7 %
PDW BLD-RTO: 19.9 % (ref 12.4–15.4)
PDW BLD-RTO: 20.1 % (ref 12.4–15.4)
PERFORMED ON: ABNORMAL
PHOSPHORUS: 2.6 MG/DL (ref 2.5–4.9)
PLATELET # BLD: 39 K/UL (ref 135–450)
PLATELET # BLD: 45 K/UL (ref 135–450)
PMV BLD AUTO: 8.8 FL (ref 5–10.5)
PMV BLD AUTO: 9.3 FL (ref 5–10.5)
POTASSIUM REFLEX MAGNESIUM: 4.4 MMOL/L (ref 3.5–5.1)
POTASSIUM SERPL-SCNC: 4.4 MMOL/L (ref 3.5–5.1)
PROTHROMBIN TIME: 38.5 SEC (ref 10–13.2)
RBC # BLD: 3.69 M/UL (ref 4.2–5.9)
RBC # BLD: 3.85 M/UL (ref 4.2–5.9)
SODIUM BLD-SCNC: 137 MMOL/L (ref 136–145)
SODIUM BLD-SCNC: 140 MMOL/L (ref 136–145)
TOTAL PROTEIN: 6.3 G/DL (ref 6.4–8.2)
TOTAL PROTEIN: 6.3 G/DL (ref 6.4–8.2)
WBC # BLD: 7.9 K/UL (ref 4–11)
WBC # BLD: 8.8 K/UL (ref 4–11)

## 2020-11-27 PROCEDURE — 6370000000 HC RX 637 (ALT 250 FOR IP): Performed by: INTERNAL MEDICINE

## 2020-11-27 PROCEDURE — 2580000003 HC RX 258: Performed by: STUDENT IN AN ORGANIZED HEALTH CARE EDUCATION/TRAINING PROGRAM

## 2020-11-27 PROCEDURE — 86900 BLOOD TYPING SEROLOGIC ABO: CPT

## 2020-11-27 PROCEDURE — 97530 THERAPEUTIC ACTIVITIES: CPT

## 2020-11-27 PROCEDURE — 99232 SBSQ HOSP IP/OBS MODERATE 35: CPT | Performed by: SURGERY

## 2020-11-27 PROCEDURE — 86850 RBC ANTIBODY SCREEN: CPT

## 2020-11-27 PROCEDURE — 97163 PT EVAL HIGH COMPLEX 45 MIN: CPT

## 2020-11-27 PROCEDURE — 83735 ASSAY OF MAGNESIUM: CPT

## 2020-11-27 PROCEDURE — 80053 COMPREHEN METABOLIC PANEL: CPT

## 2020-11-27 PROCEDURE — 6360000002 HC RX W HCPCS: Performed by: STUDENT IN AN ORGANIZED HEALTH CARE EDUCATION/TRAINING PROGRAM

## 2020-11-27 PROCEDURE — 1200000000 HC SEMI PRIVATE

## 2020-11-27 PROCEDURE — 82140 ASSAY OF AMMONIA: CPT

## 2020-11-27 PROCEDURE — 36415 COLL VENOUS BLD VENIPUNCTURE: CPT

## 2020-11-27 PROCEDURE — 97166 OT EVAL MOD COMPLEX 45 MIN: CPT

## 2020-11-27 PROCEDURE — 86901 BLOOD TYPING SEROLOGIC RH(D): CPT

## 2020-11-27 PROCEDURE — 85025 COMPLETE CBC W/AUTO DIFF WBC: CPT

## 2020-11-27 PROCEDURE — 2580000003 HC RX 258: Performed by: INTERNAL MEDICINE

## 2020-11-27 PROCEDURE — 85610 PROTHROMBIN TIME: CPT

## 2020-11-27 RX ADMIN — TRAZODONE HYDROCHLORIDE 200 MG: 100 TABLET ORAL at 23:33

## 2020-11-27 RX ADMIN — LORAZEPAM 0.5 MG: 0.5 TABLET ORAL at 23:33

## 2020-11-27 RX ADMIN — CARBIDOPA AND LEVODOPA 1 TABLET: 25; 100 TABLET ORAL at 23:33

## 2020-11-27 RX ADMIN — LAMOTRIGINE 25 MG: 25 TABLET ORAL at 05:39

## 2020-11-27 RX ADMIN — MUPIROCIN: 20 OINTMENT TOPICAL at 08:17

## 2020-11-27 RX ADMIN — LAMOTRIGINE 25 MG: 25 TABLET ORAL at 16:52

## 2020-11-27 RX ADMIN — MEROPENEM 1 G: 1 INJECTION, POWDER, FOR SOLUTION INTRAVENOUS at 05:34

## 2020-11-27 RX ADMIN — METOPROLOL TARTRATE 25 MG: 25 TABLET, FILM COATED ORAL at 23:34

## 2020-11-27 RX ADMIN — TAMSULOSIN HYDROCHLORIDE 0.4 MG: 0.4 CAPSULE ORAL at 23:33

## 2020-11-27 RX ADMIN — ATORVASTATIN CALCIUM 20 MG: 20 TABLET, FILM COATED ORAL at 23:34

## 2020-11-27 RX ADMIN — MEROPENEM 1 G: 1 INJECTION, POWDER, FOR SOLUTION INTRAVENOUS at 16:33

## 2020-11-27 RX ADMIN — DOCUSATE SODIUM 100 MG: 100 CAPSULE, LIQUID FILLED ORAL at 23:33

## 2020-11-27 RX ADMIN — HYDROCORTISONE 30 MG: 20 TABLET ORAL at 08:18

## 2020-11-27 RX ADMIN — METOPROLOL TARTRATE 25 MG: 25 TABLET, FILM COATED ORAL at 08:16

## 2020-11-27 RX ADMIN — CARBIDOPA AND LEVODOPA 1 TABLET: 25; 100 TABLET ORAL at 12:06

## 2020-11-27 RX ADMIN — MEROPENEM 1 G: 1 INJECTION, POWDER, FOR SOLUTION INTRAVENOUS at 23:34

## 2020-11-27 RX ADMIN — DIGOXIN 125 MCG: 125 TABLET ORAL at 08:16

## 2020-11-27 RX ADMIN — DOCUSATE SODIUM 100 MG: 100 CAPSULE, LIQUID FILLED ORAL at 08:16

## 2020-11-27 RX ADMIN — CARBIDOPA AND LEVODOPA 1 TABLET: 25; 100 TABLET ORAL at 08:16

## 2020-11-27 RX ADMIN — SODIUM CHLORIDE: 900 INJECTION INTRAVENOUS at 05:41

## 2020-11-27 RX ADMIN — HYDROCORTISONE 30 MG: 20 TABLET ORAL at 23:36

## 2020-11-27 RX ADMIN — CYANOCOBALAMIN TAB 1000 MCG 1000 MCG: 1000 TAB at 08:16

## 2020-11-27 RX ADMIN — SODIUM CHLORIDE: 900 INJECTION INTRAVENOUS at 16:32

## 2020-11-27 ASSESSMENT — PAIN SCALES - GENERAL
PAINLEVEL_OUTOF10: 0

## 2020-11-27 NOTE — PLAN OF CARE
Problem: Skin Integrity:  Goal: Will show no infection signs and symptoms  Description: Will show no infection signs and symptoms  11/27/2020 0118 by Eugenio Ontiveros RN  Outcome: Ongoing     Problem: Skin Integrity:  Goal: Absence of new skin breakdown  Description: Absence of new skin breakdown  Outcome: Ongoing     Problem: Falls - Risk of:  Goal: Will remain free from falls  Description: Will remain free from falls  11/27/2020 0118 by Eugenio Ontiveros RN  Outcome: Ongoing     Problem: Falls - Risk of:  Goal: Absence of physical injury  Description: Absence of physical injury  Outcome: Ongoing     Problem: Pain:  Goal: Pain level will decrease  Description: Pain level will decrease  11/27/2020 0118 by Eugenio Ontiveros RN  Outcome: Ongoing     Problem: Pain:  Goal: Control of acute pain  Description: Control of acute pain  Outcome: Ongoing     Problem: Pain:  Goal: Control of chronic pain  Description: Control of chronic pain  Outcome: Ongoing     Problem: Confusion - Acute:  Goal: Absence of continued neurological deterioration signs and symptoms  Description: Absence of continued neurological deterioration signs and symptoms  11/27/2020 0118 by Eugenio Ontiveros RN  Outcome: Ongoing     Problem: Confusion - Acute:  Goal: Mental status will be restored to baseline  Description: Mental status will be restored to baseline  Outcome: Ongoing     Problem: Injury - Risk of, Physical Injury:  Goal: Will remain free from falls  Description: Will remain free from falls  11/27/2020 0118 by Eugenio Ontiveros RN  Outcome: Ongoing     Problem: Injury - Risk of, Physical Injury:  Goal: Absence of physical injury  Description: Absence of physical injury  Outcome: Ongoing     Problem: Mood - Altered:  Goal: Mood stable  Description: Mood stable  Outcome: Ongoing     Problem: Sleep Pattern Disturbance:  Goal: Appears well-rested  Description: Appears well-rested  Outcome: Ongoing

## 2020-11-27 NOTE — PLAN OF CARE
Problem: Falls - Risk of:  Goal: Will remain free from falls  Description: Will remain free from falls  Outcome: Ongoing  Note: Discussed with pt importance to keep bed low and locked with alarm activated, nonskid socks on when out of bed, call light and belongings within reach- will monitor.

## 2020-11-27 NOTE — PLAN OF CARE
Problem: Skin Integrity:  Goal: Will show no infection signs and symptoms  Description: Will show no infection signs and symptoms  11/27/2020 1108 by Crissie Romberg, RN  Outcome: Ongoing  Note: Pt afebrile with a WBC WDL. Pt has no new signs/symptoms of infection at this time. Problem: Falls - Risk of:  Goal: Will remain free from falls  Description: Will remain free from falls  11/27/2020 1108 by Crissie Romberg, RN  Outcome: Ongoing  Note: Pt at high risk for falls. Pt currently in bed with bed alarm on and bed locked in lowest position and locked. Call light and personal belongings within reach. Will continue to monitor. Problem: Pain:  Goal: Pain level will decrease  Description: Pain level will decrease  11/27/2020 1108 by Crissie Romberg, RN  Outcome: Ongoing  Note: Pt has complained of no pain thus far this shift. Pt instructed to call should pain develop. Will continue to monitor. Problem: Confusion - Acute:  Goal: Absence of continued neurological deterioration signs and symptoms  Description: Absence of continued neurological deterioration signs and symptoms  11/27/2020 1108 by Crissie Romberg, RN  Outcome: Ongoing  Note: Pt oriented x4. Pt answers orientation questions appropriately but seems confused with regular conversation. Pt very sleepy but arouses to voice. Will continue to monitor. Problem: Injury - Risk of, Physical Injury:  Goal: Will remain free from falls  Description: Will remain free from falls  11/27/2020 1108 by Crissie Romberg, RN  Outcome: Ongoing  Note: Pt at high risk for falls. Pt currently in bed with bed alarm on and bed locked in lowest position and locked. Call light and personal belongings within reach. Will continue to monitor. Problem: Sleep Pattern Disturbance:  Goal: Appears well-rested  Description: Appears well-rested  11/27/2020 1108 by Crissie Romberg, RN  Outcome: Ongoing  Note: Pt in and out of sleep. Will continue to monitor.

## 2020-11-27 NOTE — PROGRESS NOTES
Less abdominal pain. T.B slight increase but Transaminases declining. IV antibiotics. Perc.  Cholecystostomy on hold per surgery

## 2020-11-27 NOTE — PROGRESS NOTES
Occupational Therapy   Occupational Therapy Initial Assessment/Tx Note   Date: 2020   Patient Name: Puneet Jiang  MRN: [id number]     : 1933    Date of Service: 2020      Assessment: Functional independence is decreased from baseline. Pt can typically transfer to his scooter with one person assist. Currently, he would require two person or honorio. He is having difficulty following commands and attending to tasks. Recommend continued inpt OT/PT at d/c. Discharge Recommendations: Puneet Jiang scored a 10 on the AM-PAC ADL Inpatient form. Current research shows that an AM-PAC score of 17 or less is typically not associated with a discharge to the patient's home setting. Based on the patient's AM-PAC score and their current ADL deficits, it is recommended that the patient have 3-5 sessions per week of Occupational Therapy at d/c to increase the patient's independence. Please see assessment section for further patient specific details. OT Equipment Recommendations  Equipment Needed: No    Assessment   Performance deficits / Impairments: Decreased functional mobility ; Decreased ADL status; Decreased strength;Decreased cognition;Decreased endurance  Treatment Diagnosis: Decreased activity tolerance, impaired ADLs and mobility  Decision Making: Medium Complexity  REQUIRES OT FOLLOW UP: Yes  Activity Tolerance  Activity Tolerance: Patient Tolerated treatment well  Activity Tolerance: Limited by decreased cognition  Safety Devices  Safety Devices in place: Yes  Type of devices: Call light within reach;Nurse notified; Bed alarm in place; Left in bed         Treatment Diagnosis: Decreased activity tolerance, impaired ADLs and mobility      Restrictions  Position Activity Restriction  Other position/activity restrictions: Up as tolerated    Subjective   General  Chart Reviewed: Yes  Additional Pertinent Hx: Pt to ED  with abdominal pain and admitted for acute cholecysitis. Surgery consult. PMH:  HTN, DM, CAD, cancer, OA, back pain  Family / Caregiver Present: No  Referring Practitioner: Roxana Shafer  Diagnosis: cholecystitis  Subjective  Subjective: Pt in bed on entry. Eyes closed but responsive. Patient Currently in Pain: Denies  Pain Assessment  Pain Assessment: 0-10    Social/Functional History  Social/Functional History  Lives With: Alone  Type of Home: Assisted living([facility])  Home Layout: One level  Home Access: Elevator  Bathroom Toilet: Handicap height  Bathroom Equipment: Grab bars in shower, Grab bars around toilet, Built-in shower seat, Hand-held shower  Home Equipment: Rolling walker, Electric scooter, Alert Button, Lift chair  ADL Assistance: Needs assistance  Homemaking Assistance: Needs assistance  Ambulation Assistance: Needs assistance(uses scooter for mobility)  Transfer Assistance: Needs assistance(staff assists with stand pivot transfer to/from scooter)  Active : No  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 History obtained from previous admission. Pt's wife lives upstairs in assisted living. Pt had 2 hospital stays in October. Pt transitioned to assisted living at that time for increased assist from staff. Objective        Orientation  Overall Orientation Status: Impaired  Orientation Level: Oriented to person;Oriented to place; Disoriented to time;Disoriented to situation     Balance  Sitting Balance: Supervision(EOB x 10 min)  Standing Balance: Unable to assess(comment)(pt does not reach full stand at baseline; reached partial stance with mod assist x2 for scooting)  ADL  Feeding: Other (Comment)(Pt coughing when taking pills with RN)  Grooming: Other (Comment)(eyes closed, not attending to task)  LE Dressing: Dependent/Total  Toileting: Dependent/Total  Coordination  Coordination and Movement description: Decreased speed;Decreased accuracy     Bed mobility  Scootin Person assistance(mod assist x2)  Transfers  Transfer Comments: recommend honorio lift for transfers     Cognition  Overall Cognitive Status: Exceptions  Cognition Comment: awake, eyes closed but responsive, answers questions, require repetition of cues/instruction, delayed initiation, impaired attention and memory       LUE Strength  Gross LUE Strength: [facility] to [facility]  LUE Strength Comment: 3+/5 grossly  RUE Strength  Gross RUE Strength: Exceptions to [facility]  RUE Strength Comment: 3+/5 grossly        Plan  If pt discharges prior to next tx, this note will serve as d/c summary.  Continue per POC if pt does not d/c.     Plan  Times per week: 2-5x  Times per day: Daily  Current Treatment Recommendations: Strengthening, Balance Training, Functional Mobility Training, Endurance Training, Self-Care / ADL, Safety Education & Training, Patient/Caregiver Education & Training, Equipment Evaluation, Education, & procurement      AM-PAC Score  AM-PAC Inpatient Daily Activity Raw Score: 10 (11/27/20 0945)  AM-PAC Inpatient ADL T-Scale Score : 27.31 (11/27/20 0945)  ADL Inpatient CMS 0-100% Score: 74.7 (11/27/20 0945)  ADL Inpatient CMS G-Code Modifier : CL (11/27/20 0945)    Goals  Short term goals  Time Frame for Short term goals: by D/C  Short term goal 1: Tolerate pivot transfer to chair - Not met  Short term goal 2: Scoot EOB with spvn - Not met  Short term goal 3: Participate in seated grooming tasks with set up - Not met  Patient Goals   Patient goals : no goal stated       Therapy Time   Individual Concurrent Group Co-treatment   Time In 0825         Time Out 0850         Minutes 25          Timed Code Tx Min: 10  Total Tx Time:   [address], OT

## 2020-11-27 NOTE — PROGRESS NOTES
Hospitalist Progress Note      PCP: Isaak THACKER    Date of Admission: 11/22/2020    Chief Complaint: Abdominal pain    Hospital Course: This is a 66-year-old male with a history of Parkinson's disease, coronary artery disease, diabetes mellitus type 2, hypertension, CHF, A. fib admitted with worsening right upper quadrant abdominal pain, acute cholecystitis/sepsis receiving IV antibiotics surgery, GI consulted recommending to consult [facility] for ERCP.     Subjective:    Patient seen and examined  Per nursing patient confused today and he pulled out his only IV axis  Platelets 38  Bilateral arms are edematous  INR 3.78    Medications:  Reviewed    Infusion Medications    sodium chloride 100 mL/hr at 11/27/20 0541    dextrose       Scheduled Medications    insulin glargine  12 Units Subcutaneous Nightly    hydrocortisone  30 mg Oral Nightly    metoprolol tartrate  25 mg Oral BID    meropenem  1 g Intravenous Q8H    [Held by provider] polyethylene glycol  17 g Oral Daily    carbidopa-levodopa  1 tablet Oral TID    cyanocobalamin  1,000 mcg Oral Daily    digoxin  125 mcg Oral Daily    docusate sodium  100 mg Oral BID    linaclotide  145 mcg Oral QAM AC    mupirocin   Topical Daily    tamsulosin  0.4 mg Oral Nightly    traZODone  200 mg Oral Nightly    lamoTRIgine  25 mg Oral BID    atorvastatin  20 mg Oral Nightly    sodium chloride flush  10 mL Intravenous 2 times per day    hydrocortisone  30 mg Oral Daily     PRN Meds: guaiFENesin, magnesium hydroxide, bisacodyl, sodium chloride flush, polyethylene glycol, promethazine **OR** ondansetron, LORazepam, glucose, dextrose, glucagon (rDNA), dextrose      Intake/Output Summary (Last 24 hours) at 11/27/2020 1609  Last data filed at 11/27/2020 0544  Gross per 24 hour   Intake 4464.67 ml   Output 725 ml   Net 3739.67 ml       Physical Exam Performed:    /76   Pulse 68   Temp 97.8 °F (36.6 °C) (Axillary)   Resp 20   Ht 6' 1\" (1.854 m) Wt 223 lb 8.7 oz (101.4 kg)   SpO2 96%   BMI 29.49 kg/m²     Medication eyes:  General appearance: No apparent distress, appears stated age and cooperative. HEENT: Pupils equal, round, and reactive to light. Conjunctivae/corneas clear. Neck: Supple, with full range of motion. No jugular venous distention. Trachea midline. Respiratory:  Normal respiratory effort. Clear to auscultation, bilaterally without Rales/Wheezes/Rhonchi. Cardiovascular: Regular rate and rhythm with normal S1/S2 without murmurs, rubs or gallops. Abdomen: Soft, non-tender, non-distended with normal bowel sounds. Musculoskeletal: No clubbing, cyanosis or edema bilaterally. Full range of motion without deformity. Skin: Skin color, texture, turgor normal.  No rashes or lesions. Neurologic:  Neurovascularly intact without any focal sensory/motor deficits. Cranial nerves: II-XII intact, grossly non-focal.  Psychiatric: Alert and oriented, thought content appropriate, normal insight  Capillary Refill: Brisk,< 3 seconds   Peripheral Pulses: +2 palpable, equal bilaterally     Labs:   Recent Labs     11/25/20 0521 11/26/20 0621 11/27/20  0440   WBC 9.8 8.7 8.8   HGB 12.2* 11.8* 11.8*   HCT 37.7* 36.7* 36.6*   PLT 68* 65* 39*     Recent Labs     11/25/20 0521 11/26/20 0621 11/27/20  0440   * 135* 137   K 5.2* 4.3 4.4   CL 98* 99 103   CO2 21 20* 21   BUN 45* 44* 42*   CREATININE 1.1 1.1 0.9   CALCIUM 9.1 8.8 9.0   PHOS 3.9 2.2* 2.6     Recent Labs     11/25/20 0521 11/26/20 0621 11/27/20  0440   AST 2,426* 1,017* 794*   * 313* 487*   BILIDIR 1.7* 1.4* 1.6*   BILITOT 3.4* 2.7* 3.1*   ALKPHOS 124 125 131*     Recent Labs     11/25/20 0521 11/26/20 0621 11/27/20  0851   INR 3.67* 3.09* 3.28*     No results for input(s): CKTOTAL, TROPONINI in the last 72 hours.     Urinalysis:      Lab Results   Component Value Date    NITRU Negative 11/22/2020    WBCUA 0-2 11/22/2020    BACTERIA Rare 11/22/2020    RBCUA None seen 11/22/2020 BLOODU Negative 11/22/2020    SPECGRAV 1.020 11/22/2020    GLUCOSEU Negative 11/22/2020       Radiology:  CT ABDOMEN PELVIS W IV CONTRAST Additional Contrast? None   Final Result      Cholelithiasis with gallbladder wall thickening and pericholecystic fluid consistent with acute cholecystitis. Please correlate clinically. Small amount of free ascitic fluid. Moderate bilateral effusions with compressive atelectasis and cardiac enlargement as well as evidence of right heart failure. Small kidneys bilaterally with simple cyst in the left kidney. Severe fatty infiltration of the liver. Radiation seeds in the prostate                    Assessment/Plan:    Active Hospital Problems    Diagnosis    Cholecystitis [K81.9]     1. This is a 25-year-old male admitted with sepsis secondary to acute cholecystitis concern for possible cholangitis, surgery and GI consulted and following. Planning to treat with IV antibiotics . percutaneous cholecystotomy on hold per surgery    2. Transaminitis secondary to above, follow-up with the daily CMP. 3.  A. fib :-on beta-blocker and digoxin cardiology consulted and following. INR 3. 7 8 today    4. Coagulopathy and thrombocytopenia secondary to sepsis and DIC follow closely. Hemoccult consulted. Please refer to heme-onc notes from 11/25. He is not actively bleeding    5. Parkinson disease continue with home medication. 6.  Adrenal insufficiency:- history of chronic steroids use on  stress dose steroid currently. 7.  History of TAVR cardiology following. Patient is on Eliquis. Will need to be back on anticoagulation when possible    8. Diabetes mellitus type 2 with elevated blood sugar secondary to steroid increase basal insulin, hemoglobin A1c= 7.4 on 11/22/2020.    9. Severe anion gap metabolic acidosis with lactic acidosis:-Anion gap is improved  Nephrology following  Decreased urine output    10.  History of congestive heart failure with ejection fraction of 20 to 25%:-Cardiology consulted as above, signed off    DVT Prophylaxis: Contraindicated as elevated INR    Diet: DIET CLEAR LIQUID; Carb Control: 3 carb choices (45 gms)/meal  Dietary Nutrition Supplements: Clear Liquid Oral Supplement  Code Status: DNR-CCA    PT/OT Eval Status: n/a    Dispo - inpatient     Petra Schwab, MD

## 2020-11-27 NOTE — PROGRESS NOTES
leg is broken. \"  (re: R LE)  Pain Screening  Patient Currently in Pain: Denies    Orientation  Impaired  Orientation Level: Oriented to place;Oriented to person;Disoriented to time;Disoriented to situation    Social/Functional History  Lives With: Alone  Type of Home: Assisted living([facility])  Home Layout: One level  Home Access: Elevator  Bathroom Toilet: Handicap height  Bathroom Equipment: Grab bars in shower, Grab bars around toilet, Built-in shower seat, Hand-held shower  Home Equipment: Rolling walker, Electric scooter, Alert Button, Lift chair  ADL Assistance: Needs assistance  Homemaking Assistance: Needs assistance  Ambulation Assistance: Needs assistance(uses scooter for mobility)  Transfer Assistance: Needs assistance(staff assists with stand pivot transfer to/from scooter)  Active : No  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 History obtained from previous admission. Pt's wife lives upstairs in assisted living. Pt had 2 hospital stays in October. Pt transitioned to assisted living at that time for increased assist from staff.       Objective  Strength  Strength RLE: (grossly decreased)  Strength LLE: (grossly decreased)    Bed mobility  Supine to Sit: Maximum assistance(HOB raised, increased verbal cues, increased time and effort)  Sit to Supine: 2 Person assistance(Mod A x2, bed flat, assist to lift LEs up into bed)     Transfers  Sit to Stand: 2 Person Assistance(Mod A x2 to partial stance)  Stand to sit: 2 Person Assistance(Mod A x2)  Lateral Transfers: 2 Person Assistance(Mod A x2, along EOB to simulate squat pivot transfer)     Ambulation  Ambulation?: No     Balance  Sitting - Static: Good  Sitting - Dynamic: Fair  Standing - Static: Poor  Standing - Dynamic: Poor    Treatment included gait and transfer training with patient education     Plan   Times per week: 2-5  Current Treatment Recommendations: Transfer Training, Gait Training, Functional Mobility Training, Strengthening    Safety Devices  Type of devices: Left in bed, Bed alarm in place, Call light within reach, Nurse notified      AM-PAC Score  AM-PAC Inpatient Mobility Raw Score : 8 (11/27/20 0929)  AM-PAC Inpatient T-Scale Score : 28.52 (11/27/20 0929)  Mobility Inpatient CMS 0-100% Score: 86.62 (11/27/20 0929)  Mobility Inpatient CMS G-Code Modifier : CM (11/27/20 0088)    Goals  Short term goals  Time Frame for Short term goals: Discharge  Short term goal 1: supine <> sit Min A  Short term goal 2: bed <> chair Mod A  Patient Goals   Patient goals : Unable to state       Therapy Time   Individual Concurrent Group Co-treatment   Time In 0825         Time Out 0850         Minutes 25         Timed Code Treatment Minutes:  12  Total Treatment Minutes:  25      Tyrese Nicole PT

## 2020-11-27 NOTE — PLAN OF CARE
Problem: Skin Integrity:  Goal: Will show no infection signs and symptoms  Description: Will show no infection signs and symptoms  Note: Reddened butt, zinc cream applied. Will monitor. Problem: Pain:  Goal: Pain level will decrease  Description: Pain level will decrease  Outcome: Ongoing  Note: Pt denies pain at this time. Problem: Confusion - Acute:  Goal: Absence of continued neurological deterioration signs and symptoms  Description: Absence of continued neurological deterioration signs and symptoms  Outcome: Ongoing  Note: Pt with delirium, which seems a bit worse today than yesterday- md notified.

## 2020-11-27 NOTE — PROGRESS NOTES
Surgery Daily Progress Note      CC: Acute cholecystitis     Subjective :  Patient remains confused this AM likely secondary to Ativan administration. Afebrile and hemodynamically stable overnight. Continues to deny abdominal pain, nausea, or vomiting. ROS: A 14 point review of systems was conducted, significant findings as noted above. All other systems negative. Objective     Exam:  Vitals:    11/26/20 1540 11/26/20 2050 11/26/20 2326 11/27/20 0400   BP: (!) 139/91 126/86 138/85 122/82   Pulse: 92 86 97 78   Resp: 22 20 20 20   Temp: 97.3 °F (36.3 °C) 97.6 °F (36.4 °C) 97.4 °F (36.3 °C) 97.6 °F (36.4 °C)   TempSrc: Axillary Oral Oral Axillary   SpO2: 96% 96% 94% 95%   Weight:       Height:         General appearance: alert, appears stated age and cooperative  Lungs: clear to auscultation bilaterally  Heart: Regular/ irregular HR  Abdomen: soft, minimally tender in RUQ; bowel sounds present, non distended      ASSESSMENT/PLAN:   This is a 80 y.o. male who presented with a 10 day hx of RUQ pain. CT abd shows cholelithiasis with gallbladder wall thickening and pericholecystic fluid consistent with actue cholecystitis. - Follow up am labs. If continue improve will trial clear liquids with supplements   - Patient needs better glycemic control in the setting of increased steroid and infection  - Patient is clinically improving slowly. We will hold on perc cholecystostomy at this point   - Heme/onc states no additional recommendations for reversal of INR     Saji Chavez DO  11/27/20  5:51 AM  [phone]    I have seen, examined, and reviewed the patients chart. I agree with the residents assessment and have made appropriate changes.     Dorian Bourgeois

## 2020-11-27 NOTE — PROGRESS NOTES
Patient alert to self. Patient disoriented to time place and situation. Patient vital signs stable. Patient at 1900 seemed very confused and restless as to where he was and patient pulled out peripheral iv. Peripheral iv replaced by robinson sharma rn on day shift. Patient currently resting in bed. Patient has cantor draining clear jania colored urine. Patient is afib on the monitor. Patient bed locked in lowest position with bed alarm on. Patient bedside table, call light, and belongings in reach. Will continue to monitor.

## 2020-11-28 ENCOUNTER — APPOINTMENT (OUTPATIENT)
Dept: CT IMAGING | Age: 85
DRG: 444 | End: 2020-11-28
Payer: MEDICARE

## 2020-11-28 ENCOUNTER — APPOINTMENT (OUTPATIENT)
Dept: GENERAL RADIOLOGY | Age: 85
DRG: 444 | End: 2020-11-28
Payer: MEDICARE

## 2020-11-28 LAB
ALBUMIN SERPL-MCNC: 3.6 G/DL (ref 3.4–5)
ALP BLD-CCNC: 125 U/L (ref 40–129)
ALT SERPL-CCNC: 582 U/L (ref 10–40)
ANION GAP SERPL CALCULATED.3IONS-SCNC: 22 MMOL/L (ref 3–16)
AST SERPL-CCNC: 779 U/L (ref 15–37)
BACTERIA: ABNORMAL /HPF
BASOPHILS ABSOLUTE: 0 K/UL (ref 0–0.2)
BASOPHILS RELATIVE PERCENT: 0 %
BILIRUB SERPL-MCNC: 3.2 MG/DL (ref 0–1)
BILIRUBIN DIRECT: 1.8 MG/DL (ref 0–0.3)
BILIRUBIN URINE: NEGATIVE
BILIRUBIN, INDIRECT: 1.4 MG/DL (ref 0–1)
BLOOD, URINE: ABNORMAL
BUN BLDV-MCNC: 45 MG/DL (ref 7–20)
CALCIUM SERPL-MCNC: 8.8 MG/DL (ref 8.3–10.6)
CHLORIDE BLD-SCNC: 105 MMOL/L (ref 99–110)
CLARITY: CLEAR
CO2: 14 MMOL/L (ref 21–32)
COLOR: YELLOW
CREAT SERPL-MCNC: 1.1 MG/DL (ref 0.8–1.3)
EOSINOPHILS ABSOLUTE: 0 K/UL (ref 0–0.6)
EOSINOPHILS RELATIVE PERCENT: 0 %
EPITHELIAL CELLS, UA: ABNORMAL /HPF (ref 0–5)
GFR AFRICAN AMERICAN: >60
GFR NON-AFRICAN AMERICAN: >60
GLUCOSE BLD-MCNC: 109 MG/DL (ref 70–99)
GLUCOSE BLD-MCNC: 61 MG/DL (ref 70–99)
GLUCOSE BLD-MCNC: 66 MG/DL (ref 70–99)
GLUCOSE BLD-MCNC: 68 MG/DL (ref 70–99)
GLUCOSE BLD-MCNC: 82 MG/DL (ref 70–99)
GLUCOSE BLD-MCNC: 94 MG/DL (ref 70–99)
GLUCOSE BLD-MCNC: 98 MG/DL (ref 70–99)
GLUCOSE URINE: NEGATIVE MG/DL
HCT VFR BLD CALC: 36 % (ref 40.5–52.5)
HEMOGLOBIN: 11.5 G/DL (ref 13.5–17.5)
HYALINE CASTS: ABNORMAL /LPF (ref 0–2)
INR BLD: 3.55 (ref 0.86–1.14)
KETONES, URINE: NEGATIVE MG/DL
LACTIC ACID: 3.4 MMOL/L (ref 0.4–2)
LEUKOCYTE ESTERASE, URINE: ABNORMAL
LYMPHOCYTES ABSOLUTE: 0.6 K/UL (ref 1–5.1)
LYMPHOCYTES RELATIVE PERCENT: 7.6 %
MAGNESIUM: 2 MG/DL (ref 1.8–2.4)
MCH RBC QN AUTO: 31.9 PG (ref 26–34)
MCHC RBC AUTO-ENTMCNC: 32 G/DL (ref 31–36)
MCV RBC AUTO: 99.8 FL (ref 80–100)
MICROSCOPIC EXAMINATION: YES
MONOCYTES ABSOLUTE: 1 K/UL (ref 0–1.3)
MONOCYTES RELATIVE PERCENT: 11.7 %
MUCUS: ABNORMAL /LPF
NEUTROPHILS ABSOLUTE: 6.8 K/UL (ref 1.7–7.7)
NEUTROPHILS RELATIVE PERCENT: 80.7 %
NITRITE, URINE: NEGATIVE
PDW BLD-RTO: 20.3 % (ref 12.4–15.4)
PERFORMED ON: ABNORMAL
PERFORMED ON: NORMAL
PH UA: 6 (ref 5–8)
PLATELET # BLD: 34 K/UL (ref 135–450)
PMV BLD AUTO: 10.5 FL (ref 5–10.5)
POTASSIUM REFLEX MAGNESIUM: 3.7 MMOL/L (ref 3.5–5.1)
PRO-BNP: 4601 PG/ML (ref 0–449)
PROTEIN UA: 30 MG/DL
PROTHROMBIN TIME: 41.7 SEC (ref 10–13.2)
RBC # BLD: 3.6 M/UL (ref 4.2–5.9)
RBC UA: ABNORMAL /HPF (ref 0–4)
SODIUM BLD-SCNC: 141 MMOL/L (ref 136–145)
SPECIFIC GRAVITY UA: >=1.03 (ref 1–1.03)
TOTAL PROTEIN: 6.1 G/DL (ref 6.4–8.2)
URINE REFLEX TO CULTURE: ABNORMAL
URINE TYPE: ABNORMAL
UROBILINOGEN, URINE: 2 E.U./DL
WBC # BLD: 8.5 K/UL (ref 4–11)
WBC UA: ABNORMAL /HPF (ref 0–5)

## 2020-11-28 PROCEDURE — 83605 ASSAY OF LACTIC ACID: CPT

## 2020-11-28 PROCEDURE — 6360000002 HC RX W HCPCS: Performed by: STUDENT IN AN ORGANIZED HEALTH CARE EDUCATION/TRAINING PROGRAM

## 2020-11-28 PROCEDURE — 2580000003 HC RX 258: Performed by: INTERNAL MEDICINE

## 2020-11-28 PROCEDURE — 6370000000 HC RX 637 (ALT 250 FOR IP): Performed by: INTERNAL MEDICINE

## 2020-11-28 PROCEDURE — 2500000003 HC RX 250 WO HCPCS: Performed by: INTERNAL MEDICINE

## 2020-11-28 PROCEDURE — 94640 AIRWAY INHALATION TREATMENT: CPT

## 2020-11-28 PROCEDURE — 99231 SBSQ HOSP IP/OBS SF/LOW 25: CPT | Performed by: SURGERY

## 2020-11-28 PROCEDURE — 83880 ASSAY OF NATRIURETIC PEPTIDE: CPT

## 2020-11-28 PROCEDURE — 87040 BLOOD CULTURE FOR BACTERIA: CPT

## 2020-11-28 PROCEDURE — 85610 PROTHROMBIN TIME: CPT

## 2020-11-28 PROCEDURE — 80076 HEPATIC FUNCTION PANEL: CPT

## 2020-11-28 PROCEDURE — 71045 X-RAY EXAM CHEST 1 VIEW: CPT

## 2020-11-28 PROCEDURE — 81001 URINALYSIS AUTO W/SCOPE: CPT

## 2020-11-28 PROCEDURE — 51702 INSERT TEMP BLADDER CATH: CPT

## 2020-11-28 PROCEDURE — 6360000002 HC RX W HCPCS: Performed by: SURGERY

## 2020-11-28 PROCEDURE — 85025 COMPLETE CBC W/AUTO DIFF WBC: CPT

## 2020-11-28 PROCEDURE — 1200000000 HC SEMI PRIVATE

## 2020-11-28 PROCEDURE — 80048 BASIC METABOLIC PNL TOTAL CA: CPT

## 2020-11-28 PROCEDURE — 70450 CT HEAD/BRAIN W/O DYE: CPT

## 2020-11-28 PROCEDURE — 94761 N-INVAS EAR/PLS OXIMETRY MLT: CPT

## 2020-11-28 PROCEDURE — 83735 ASSAY OF MAGNESIUM: CPT

## 2020-11-28 PROCEDURE — 36415 COLL VENOUS BLD VENIPUNCTURE: CPT

## 2020-11-28 PROCEDURE — 2580000003 HC RX 258: Performed by: STUDENT IN AN ORGANIZED HEALTH CARE EDUCATION/TRAINING PROGRAM

## 2020-11-28 RX ORDER — ALBUTEROL SULFATE 2.5 MG/3ML
2.5 SOLUTION RESPIRATORY (INHALATION) EVERY 6 HOURS PRN
Status: DISCONTINUED | OUTPATIENT
Start: 2020-11-28 | End: 2020-11-30 | Stop reason: HOSPADM

## 2020-11-28 RX ADMIN — DEXTROSE MONOHYDRATE 100 ML/HR: 50 INJECTION, SOLUTION INTRAVENOUS at 17:13

## 2020-11-28 RX ADMIN — CARBIDOPA AND LEVODOPA 1 TABLET: 25; 100 TABLET ORAL at 08:41

## 2020-11-28 RX ADMIN — CYANOCOBALAMIN TAB 1000 MCG 1000 MCG: 1000 TAB at 08:43

## 2020-11-28 RX ADMIN — SODIUM BICARBONATE: 84 INJECTION, SOLUTION INTRAVENOUS at 18:13

## 2020-11-28 RX ADMIN — DEXTROSE MONOHYDRATE 12.5 G: 25 INJECTION, SOLUTION INTRAVENOUS at 08:51

## 2020-11-28 RX ADMIN — MEROPENEM 1 G: 1 INJECTION, POWDER, FOR SOLUTION INTRAVENOUS at 13:56

## 2020-11-28 RX ADMIN — DOCUSATE SODIUM 100 MG: 100 CAPSULE, LIQUID FILLED ORAL at 08:43

## 2020-11-28 RX ADMIN — SODIUM CHLORIDE: 900 INJECTION INTRAVENOUS at 15:48

## 2020-11-28 RX ADMIN — MEROPENEM 1 G: 1 INJECTION, POWDER, FOR SOLUTION INTRAVENOUS at 22:15

## 2020-11-28 RX ADMIN — HYDROCORTISONE 30 MG: 20 TABLET ORAL at 08:42

## 2020-11-28 RX ADMIN — METOPROLOL TARTRATE 25 MG: 25 TABLET, FILM COATED ORAL at 08:43

## 2020-11-28 RX ADMIN — DEXTROSE MONOHYDRATE 12.5 G: 25 INJECTION, SOLUTION INTRAVENOUS at 17:11

## 2020-11-28 RX ADMIN — MUPIROCIN: 20 OINTMENT TOPICAL at 08:44

## 2020-11-28 RX ADMIN — DIGOXIN 125 MCG: 125 TABLET ORAL at 08:42

## 2020-11-28 RX ADMIN — SODIUM CHLORIDE: 900 INJECTION INTRAVENOUS at 05:14

## 2020-11-28 RX ADMIN — ALBUTEROL SULFATE 2.5 MG: 2.5 SOLUTION RESPIRATORY (INHALATION) at 07:51

## 2020-11-28 RX ADMIN — CARBIDOPA AND LEVODOPA 1 TABLET: 25; 100 TABLET ORAL at 12:22

## 2020-11-28 RX ADMIN — MEROPENEM 1 G: 1 INJECTION, POWDER, FOR SOLUTION INTRAVENOUS at 05:04

## 2020-11-28 ASSESSMENT — PAIN SCALES - PAIN ASSESSMENT IN ADVANCED DEMENTIA (PAINAD)
NEGVOCALIZATION: 0
FACIALEXPRESSION: 0
BODYLANGUAGE: 0
CONSOLABILITY: 0
FACIALEXPRESSION: 0
BODYLANGUAGE: 0
BREATHING: 0
NEGVOCALIZATION: 0
TOTALSCORE: 0
BREATHING: 0
TOTALSCORE: 0
CONSOLABILITY: 0

## 2020-11-28 ASSESSMENT — PAIN SCALES - GENERAL
PAINLEVEL_OUTOF10: 0
PAINLEVEL_OUTOF10: 0

## 2020-11-28 NOTE — PROGRESS NOTES
Hospitalist Progress Note      PCP: Randi THACKER    Date of Admission: 11/22/2020    Chief Complaint: Abdominal pain    Hospital Course: This is a 77-year-old male with a history of Parkinson's disease, coronary artery disease, diabetes mellitus type 2, hypertension, CHF, A. fib admitted with worsening right upper quadrant abdominal pain, acute cholecystitis/sepsis receiving IV antibiotics surgery, GI consulted recommending to consult [address] for ERCP.     Subjective:    Patient seen and examined  Per nursing patient confused today, he has not been able to feed himself  Platelets 35, bicarb of 14  Bilateral arms are edematous  CT head without acute abnormalities  Updated the wife  No new complaints    Medications:  Reviewed    Infusion Medications    sodium chloride 100 mL/hr at 11/28/20 0514    dextrose       Scheduled Medications    insulin glargine  12 Units Subcutaneous Nightly    hydrocortisone  30 mg Oral Nightly    metoprolol tartrate  25 mg Oral BID    meropenem  1 g Intravenous Q8H    [Held by provider] polyethylene glycol  17 g Oral Daily    carbidopa-levodopa  1 tablet Oral TID    cyanocobalamin  1,000 mcg Oral Daily    digoxin  125 mcg Oral Daily    docusate sodium  100 mg Oral BID    linaclotide  145 mcg Oral QAM AC    mupirocin   Topical Daily    tamsulosin  0.4 mg Oral Nightly    traZODone  200 mg Oral Nightly    lamoTRIgine  25 mg Oral BID    atorvastatin  20 mg Oral Nightly    sodium chloride flush  10 mL Intravenous 2 times per day    hydrocortisone  30 mg Oral Daily     PRN Meds: albuterol, guaiFENesin, magnesium hydroxide, bisacodyl, sodium chloride flush, polyethylene glycol, promethazine **OR** ondansetron, [Held by provider] LORazepam, glucose, dextrose, glucagon (rDNA), dextrose      Intake/Output Summary (Last 24 hours) at 11/28/2020 1128  Last data filed at 11/28/2020 0911  Gross per 24 hour   Intake 4000.01 ml   Output 950 ml   Net 3050.01 ml       Physical Exam Performed:    BP (!) 147/80   Pulse 72   Temp 96.4 °F (35.8 °C) (Axillary)   Resp 20   Ht 6' 1\" (1.854 m)   Wt 223 lb 8.7 oz (101.4 kg)   SpO2 93%   BMI 29.49 kg/m²     Medication eyes:  General appearance: No apparent distress, appears stated age and cooperative. HEENT: Pupils equal, round, and reactive to light. Conjunctivae/corneas clear. Neck: Supple, with full range of motion. No jugular venous distention. Trachea midline. Respiratory:  Normal respiratory effort. Clear to auscultation, bilaterally without Rales/Wheezes/Rhonchi. Cardiovascular: Regular rate and rhythm with normal S1/S2 without murmurs, rubs or gallops. Abdomen: Soft, non-tender, non-distended with normal bowel sounds. Musculoskeletal: No clubbing, cyanosis or edema bilaterally. Full range of motion without deformity. Skin: Skin color, texture, turgor normal.  No rashes or lesions. Neurologic:  Neurovascularly intact without any focal sensory/motor deficits.  Cranial nerves: II-XII intact, grossly non-focal.  Psychiatric: Alert and oriented, thought content appropriate, normal insight  Capillary Refill: Brisk,< 3 seconds   Peripheral Pulses: +2 palpable, equal bilaterally     Labs:   Recent Labs     11/27/20 0440 11/27/20 1711 11/28/20 0522   WBC 8.8 7.9 8.5   HGB 11.8* 12.3* 11.5*   HCT 36.6* 38.6* 36.0*   PLT 39* 45* 34*     Recent Labs     11/26/20 0621 11/27/20 0440 11/27/20 1711   * 137 140   K 4.3 4.4 4.4   CL 99 103 103   CO2 20* 21 20*   BUN 44* 42* 42*   CREATININE 1.1 0.9 1.0   CALCIUM 8.8 9.0 9.3   PHOS 2.2* 2.6  --      Recent Labs     11/26/20 0621 11/27/20 0440 11/27/20 1711 11/28/20  0522   AST 1,017* 794* 874* 779*   * 487* 424* 582*   BILIDIR 1.4* 1.6*  --  1.8*   BILITOT 2.7* 3.1* 3.4* 3.2*   ALKPHOS 125 131* 137* 125     Recent Labs     11/26/20  0621 11/27/20  0851 11/28/20  0956   INR 3.09* 3.28* 3.55*     No results for input(s): Ashley Larios in the last 72 hours.    Urinalysis:      Lab Results   Component Value Date    NITRU Negative 11/28/2020    WBCUA 6-9 11/28/2020    BACTERIA 2+ 11/28/2020    RBCUA 11-20 11/28/2020    BLOODU LARGE 11/28/2020    SPECGRAV >=1.030 11/28/2020    GLUCOSEU Negative 11/28/2020       Radiology:  XR CHEST PORTABLE   Final Result      Few patchy left basilar opacities, likely atelectasis, but developing infection cannot be excluded. CT ABDOMEN PELVIS W IV CONTRAST Additional Contrast? None   Final Result      Cholelithiasis with gallbladder wall thickening and pericholecystic fluid consistent with acute cholecystitis. Please correlate clinically. Small amount of free ascitic fluid. Moderate bilateral effusions with compressive atelectasis and cardiac enlargement as well as evidence of right heart failure. Small kidneys bilaterally with simple cyst in the left kidney. Severe fatty infiltration of the liver. Radiation seeds in the prostate                    Assessment/Plan:    Active Hospital Problems    Diagnosis    Cholecystitis [K81.9]     1. Sepsis secondary to acute cholecystitis concern for possible cholangitis, surgery and GI consulted and following. Planning to treat with IV antibiotics . percutaneous cholecystotomy on hold per surgery    2. Transaminitis secondary to above, follow-up with the daily CMP. 3.  A. fib :-on beta-blocker and digoxin cardiology consulted and following. INR 3. 7 8 today    4. Coagulopathy and thrombocytopenia secondary to sepsis and DIC follow closely. Hemoccult consulted. Please refer to heme-onc notes from 11/25. He is not actively bleeding    5. Parkinson disease continue with home medication. 6.  Adrenal insufficiency:- history of chronic steroids use on  stress dose steroid currently. 7.  History of TAVR cardiology following. Patient is on Eliquis. Will need to be back on anticoagulation when possible    8.   Diabetes mellitus type 2 with elevated blood sugar secondary to steroid increase basal insulin, hemoglobin A1c= 7.4 on 11/22/2020.    9. Severe anion gap metabolic acidosis with lactic acidosis:-Anion gap is improved  Nephrology following  Decreased urine output    10.  History of congestive heart failure with ejection fraction of 20 to 25%:-Cardiology consulted as above, signed off    DVT Prophylaxis: Contraindicated as elevated INR    Diet: DIET CLEAR LIQUID; Carb Control: 3 carb choices (45 gms)/meal  Dietary Nutrition Supplements: Clear Liquid Oral Supplement  Code Status: DNR-CCA    PT/OT Eval Status: n/a    Dispo - inpatient     Lang Mendoza MD

## 2020-11-28 NOTE — PROGRESS NOTES
Patient alert to self. Patient disoriented to time place and situation. Patient vital signs stable. Patient has iv infusing NS into foot at this time. Patient currently resting in bed. Patient has cantor draining clear jania colored urine. Patient is afib on the monitor. Patient bed locked in lowest position with bed alarm on. Patient bedside table, call light, and belongings in reach. Will continue to monitor.

## 2020-11-28 NOTE — PROGRESS NOTES
Patient screaming help me, help me. Patient has only had out 250 of dark tea colored urine all night. Patient very restless and disoriented. Messaged dr Joelle Bruce via perfect serve. No new orders at this time. Will continue to monitor.

## 2020-11-28 NOTE — PROGRESS NOTES
Surgery Daily Progress Note      CC: Acute cholecystitis     Subjective :  Pt remained disoriented at times, currently agitated. Afebrile, hemodynamically stable. Ramirez catheter in place. Diet was advanced to clear liquid diet and pt tolerated 240 ml PO intake without vomiting. ROS: A 14 point review of systems was conducted, significant findings as noted above. All other systems negative. Objective     Exam:  Vitals:    11/27/20 1639 11/27/20 1952 11/28/20 0105 11/28/20 0355   BP: (!) 142/90 (!) 130/90 117/80 121/81   Pulse: 74 84 89 63   Resp: 18 19 18 19   Temp: 97.9 °F (36.6 °C) 97.5 °F (36.4 °C) 97.8 °F (36.6 °C) 97.1 °F (36.2 °C)   TempSrc: Rectal Rectal Rectal Rectal   SpO2: 99% 97% 97% 98%   Weight:       Height:         General appearance: alert, confused, agitated  Lungs: clear to auscultation bilaterally  Heart: Regular/ irregular HR  Abdomen: soft, minimally tender in RUQ, non distended      ASSESSMENT/PLAN:   This is a 80 y.o. male who presented with a 10 day hx of RUQ pain. CT abd shows cholelithiasis with gallbladder wall thickening and pericholecystic fluid consistent with actue cholecystitis. - Follow up am labs. - Check infxn workup in the setting of delirium  - Hold Ativan  - Continue clear liquid diet  - Patient is clinically stable, INR remained high.  We will hold on perc cholecystostomy at this point.   - Continue antibiotics  - Heme/onc states no additional recommendations for reversal of INR     Aiden Mckeon MD  11/28/20  6:31 AM  [phone]

## 2020-11-28 NOTE — PLAN OF CARE
Problem: Skin Integrity:  Goal: Will show no infection signs and symptoms  Description: Will show no infection signs and symptoms  Outcome: Ongoing     Problem: Skin Integrity:  Goal: Absence of new skin breakdown  Description: Absence of new skin breakdown  Outcome: Ongoing     Problem: Falls - Risk of:  Goal: Will remain free from falls  Description: Will remain free from falls  Outcome: Ongoing     Problem: Falls - Risk of:  Goal: Absence of physical injury  Description: Absence of physical injury  Outcome: Ongoing     Problem: Pain:  Goal: Pain level will decrease  Description: Pain level will decrease  Outcome: Ongoing     Problem: Pain:  Goal: Control of acute pain  Description: Control of acute pain  Outcome: Ongoing     Problem: Pain:  Goal: Control of chronic pain  Description: Control of chronic pain  Outcome: Ongoing     Problem: Confusion - Acute:  Goal: Absence of continued neurological deterioration signs and symptoms  Description: Absence of continued neurological deterioration signs and symptoms  Outcome: Ongoing     Problem: Confusion - Acute:  Goal: Mental status will be restored to baseline  Description: Mental status will be restored to baseline  Outcome: Ongoing     Problem: Injury - Risk of, Physical Injury:  Goal: Will remain free from falls  Description: Will remain free from falls  Outcome: Ongoing     Problem: Injury - Risk of, Physical Injury:  Goal: Absence of physical injury  Description: Absence of physical injury  Outcome: Ongoing     Problem: Mood - Altered:  Goal: Mood stable  Description: Mood stable  Outcome: Ongoing     Problem: Sleep Pattern Disturbance:  Goal: Appears well-rested  Description: Appears well-rested  Outcome: Ongoing

## 2020-11-28 NOTE — PROGRESS NOTES
[facility]    [facility]              [phone]                          Interval History and plan:      Urine output is 825 mL  Blood pressure is stable    Continue hydrocortisone  30 mg p.o. twice daily. Worsening liver function is contributing to his lactic acidosis. Creatinine stable. Blood sugars are improving with changing IV fluid  Bicarbonate has improved. Anion gap has resolved to 13. No change in the plan today                     Assessment :     Severe anion gap metabolic acidosis with lactic acidosis:  Lactate corrected, evaluation is 9.0 with anion gap of 21. He also has mild hyperkalemia with hyponatremia. Blood pressure is stable. Urine output is not accurately measured is about 450 mL. He is afebrile. Acute cholecystitis:  He is on meropenem  Surgery is on the case. CT scan showed gallstones and pericholecystic fluid consistent with acute cholecystitis. Bilateral kidneys are small and simple cyst in the left kidney. Adrenal insufficiency:  He is on hydrocortisone which will be continued. Patient does have history of congestive heart failure with EF of 20 to 25%. [facility] would like to thank Yong Paez MD   for opportunity to serve this patient      Please call with questions at-   24 Hrs Answering service [phone] or  7 am- 5 pm via Perfect serve or cell phone  Parrish Conte          CC/reason for consult :     Acidosis and hyperkalemia. HPI :     Checo Concepcion is a 80 y.o. male presented to   the hospital on 11/22/2020 with abdominal pain. He has past medical history of hypertension, hyperlipidemia and osteoarthritis. He presented with abdominal pain. He also history of Parkinson's disease. His pain is in the right upper quadrant for about 1 week. It is not associated with any nausea and vomiting.   He lives in a nursing facility and was told to come to the hospital.    He is now admitted for acute cholecystitis and is n.p.o. He was on IV fluid which was switched to lactated Ringer's. He is also placed on Zosyn. Surgery is consulted for percutaneous cholecystectomy. He has history of adrenal insufficiency and is on home steroids. I was called for further management of worsening lactic acidosis and hyperkalemia. ROS:     Seen with-student    positives in bold   Constitutional:  fever, chills, weakness, weight change, fatigue  Skin:  rash, pruritus, hair loss, bruising, dry skin, petechiae  Head, Face, Neck   headaches, swelling,  cervical adenopathy  Respiratory: shortness of breath, cough, or wheezing  Cardiovascular: chest pain, palpitations, dizzy, edema  Gastrointestinal: nausea, vomiting, diarrhea, constipation,belly pain    Yellow skin, blood in stool  Musculoskeletal:  back pain, muscle weakness, gait problems,       joint pain or swelling. Genitourinary:  dysuria, poor urine flow, flank pain, blood in urine  Neurologic:  vertigo, TIA'S, syncope, seizures, focal weakness  Psychosocial:  insomnia, anxiety, or depression. All other remaining systems are negative or unable to obtain        PMH/PSH/SH/Family History:     Past Medical History:   Diagnosis Date    Arthritis     Back pain     CAD (coronary artery disease)     Cancer (HCC)     prostate    Diabetes mellitus ([facility] [address]     Hyperlipidemia     Hypertension     Rosacea        Past Surgical History:   Procedure Laterality Date    BACK SURGERY      CATARACT REMOVAL WITH IMPLANT      COLONOSCOPY  9/21/11    COLONOSCOPY  2/11/2015    polyp, diverticulosis    CORONARY ANGIOPLASTY WITH STENT PLACEMENT      CORONARY ARTERY BYPASS GRAFT      EYE SURGERY      JOINT REPLACEMENT      knee    PROSTATE SURGERY      seed implants    SHOULDER SURGERY          reports that he has never smoked. He has never used smokeless tobacco. He reports that he does not drink alcohol or use drugs.     family history includes Cancer in his mother and sister.          Medication:     Current Facility-Administered Medications: 0.9 % sodium chloride infusion, , Intravenous, Continuous  insulin glargine (LANTUS;BASAGLAR) injection pen 12 Units, 12 Units, Subcutaneous, Nightly  hydrocortisone (CORTEF) tablet 30 mg, 30 mg, Oral, Nightly  metoprolol tartrate (LOPRESSOR) tablet 25 mg, 25 mg, Oral, BID  guaiFENesin (MUCINEX) extended release tablet 600 mg, 600 mg, Oral, BID PRN  meropenem (MERREM) 1 g in sodium chloride 0.9 % 100 mL IVPB (mini-bag), 1 g, Intravenous, Q8H  magnesium hydroxide (MILK OF MAGNESIA) 400 MG/5ML suspension 30 mL, 30 mL, Oral, Daily PRN  [Held by provider] polyethylene glycol (GLYCOLAX) packet 17 g, 17 g, Oral, Daily  bisacodyl (DULCOLAX) suppository 10 mg, 10 mg, Rectal, Daily PRN  carbidopa-levodopa (SINEMET)  MG per tablet 1 tablet, 1 tablet, Oral, TID  vitamin B-12 (CYANOCOBALAMIN) tablet 1,000 mcg, 1,000 mcg, Oral, Daily  digoxin (LANOXIN) tablet 125 mcg, 125 mcg, Oral, Daily  docusate sodium (COLACE) capsule 100 mg, 100 mg, Oral, BID  linaclotide (LINZESS) capsule 145 mcg, 145 mcg, Oral, QAM AC  mupirocin (BACTROBAN) 2 % ointment, , Topical, Daily  tamsulosin (FLOMAX) capsule 0.4 mg, 0.4 mg, Oral, Nightly  traZODone (DESYREL) tablet 200 mg, 200 mg, Oral, Nightly  lamoTRIgine (LAMICTAL) tablet 25 mg, 25 mg, Oral, BID  atorvastatin (LIPITOR) tablet 20 mg, 20 mg, Oral, Nightly  sodium chloride flush 0.9 % injection 10 mL, 10 mL, Intravenous, 2 times per day  sodium chloride flush 0.9 % injection 10 mL, 10 mL, Intravenous, PRN  polyethylene glycol (GLYCOLAX) packet 17 g, 17 g, Oral, Daily PRN  promethazine (PHENERGAN) tablet 12.5 mg, 12.5 mg, Oral, Q6H PRN **OR** ondansetron (ZOFRAN) injection 4 mg, 4 mg, Intravenous, Q6H PRN  [Held by provider] LORazepam (ATIVAN) tablet 0.5 mg, 0.5 mg, Oral, Nightly PRN  glucose (GLUTOSE) 40 % oral gel 15 g, 15 g, Oral, PRN  dextrose 50 % IV solution, 12.5 g, Intravenous, PRN  glucagon

## 2020-11-28 NOTE — PROGRESS NOTES
RESPIRATORY THERAPY ASSESSMENT    Name:   Jayce Hudson Record Number:  [id number]  Age: 80 y.o. Gender: male  : 1933  Today's Date:  2020  Room:  6322/6322-01    Assessment     Is the patient being admitted for a COPD or Asthma exacerbation? No   (If yes the patient will be seen every 4 hours for the first 24 hours and then reassessed)    Patient Admission Diagnosis      Allergies  Allergies   Allergen Reactions    Ramipril Swelling     Possible angioedema  Patient doesn't like the way it feels      Gabapentin     Morphine            Pulmonary History:CHF/Pulmonary Edema  Home Oxygen Therapy:  room air  Home Respiratory Therapy:None   Current Respiratory Therapy:  Albuterol Q6prn  Treatment Type: HHN  Medications: Albuterol    Respiratory Severity Index(RSI)   Patients with orders for inhalation medications, oxygen, or any therapeutic treatment modality will be placed on Respiratory Protocol. They will be assessed with the first treatment and at least every 72 hours thereafter. The following severity scale will be used to determine frequency of treatment intervention.     Smoking History: No Smoking History = 0    Social History  Social History     Tobacco Use    Smoking status: Never Smoker    Smokeless tobacco: Never Used   Substance Use Topics    Alcohol use: No    Drug use: Never       Recent Surgical History: None = 0  Past Surgical History  Past Surgical History:   Procedure Laterality Date    BACK SURGERY      CATARACT REMOVAL WITH IMPLANT      COLONOSCOPY  11    COLONOSCOPY  2015    polyp, diverticulosis    CORONARY ANGIOPLASTY WITH STENT PLACEMENT      CORONARY ARTERY BYPASS GRAFT      EYE SURGERY      JOINT REPLACEMENT      knee    PROSTATE SURGERY      seed implants    SHOULDER SURGERY         Level of Consciousness: Alert, Follows Commands but Disoriented = 1    Level of Activity: Mostly sedentary, minimal walking = 2    Respiratory Pattern: Regular Pattern; RR 8-20 = 0    Breath Sounds: Diminshed bilaterally and/or crackles = 2    Sputum   ,  , Sputum How Obtained: None  Cough: Strong, spontaneous, non-productive = 0    Vital Signs   /75   Pulse 69   Temp 96.4 °F (35.8 °C) (Axillary)   Resp 22   Ht 6' 1\" (1.854 m)   Wt 223 lb 8.7 oz (101.4 kg)   SpO2 93%   BMI 29.49 kg/m²   SPO2 (COPD values may differ): Greater than or equal to 92% on room air = 0    Peak Flow (asthma only): not applicable = 0    RSI: 5-6 = Q4hr PRN (every four hours as needed) for dyspnea        Plan       Goals: medication delivery    Patient/caregiver was educated on the proper method of use for Respiratory Care Devices:  No: pt is not able to follow commands at this time      Level of patient/caregiver understanding able to:   ? Verbalize understanding   ? Demonstrate understanding       ? Teach back        ? Needs reinforcement       ? No available caregiver               ? Other:     Response to education:  pt is not able to follow commands at this time     Is patient being placed on Home Treatment Regimen? No     Does the patient have everything they need prior to discharge? NA     Comments: pt assessed, chart reviewed. No distress noted    Plan of Care: albuterol Q6prn    Electronically signed by Shanique Wang RCP on 11/28/2020 at 12:52 PM    Respiratory Protocol Guidelines     1. Assessment and treatment by Respiratory Therapy will be initiated for medication and therapeutic interventions upon initiation of aerosolized medication. 2. Physician will be contacted for respiratory rate (RR) greater than 35 breaths per minute. Therapy will be held for heart rate (HR) greater than 140 beats per minute, pending direction from physician. 3. Bronchodilators will be administered via Metered Dose Inhaler (MDI) with spacer when the following criteria are met:  a.  Alert and cooperative     b. HR < 140 bpm  c. RR < 30 bpm                d. Can demonstrate a 2-3 second inspiratory hold  4. Bronchodilators will be administered via Hand Held Nebulizer [facility]) to patients when ANY of the following criteria are met  a. Incognizant or uncooperative          b. Patients treated with HHN at Home        c. Unable to demonstrate proper use of MDI with spacer     d. RR > 30 bpm   5. Bronchodilators will be delivered via Metered Dose Inhaler (MDI), HHN, Aerogen to intubated patients on mechanical ventilation. 6. Inhalation medication orders will be delivered and/or substituted as outlined below. Aerosolized Medications Ordering and Administration Guidelines:    1. All Medications will be ordered by a physician, and their frequency and/or modality will be adjusted as defined by the patients Respiratory Severity Index (RSI) score. 2. If the patient does not have documented COPD, consider discontinuing anticholinergics when RSI is less than 9.  3. If the bronchospasm worsens (increased RSI), then the bronchodilator frequency can be increased to a maximum of every 4 hours. If greater than every 4 hours is required, the physician will be contacted. 4. If the bronchospasm improves, the frequency of the bronchodilator can be decreased, based on the patient's RSI, but not less than home treatment regimen frequency. 5. Bronchodilator(s) will be discontinued if patient has a RSI less than 9 and has received no scheduled or as needed treatment for 72  Hrs. Patients Ordered on a Mucolytic Agent:    1. Must always be administered with a bronchodilator. 2. Discontinue if patient experiences worsened bronchospasm, or secretions have lessened to the point that the patient is able to clear them with a cough. Anti-inflammatory and Combination Medications:    1. If the patient lacks prior history of lung disease, is not using inhaled anti-inflammatory medication at home, and lacks wheezing by examination or by history for at least 24 hours, contact physician for possible discontinuation.

## 2020-11-28 NOTE — PLAN OF CARE
Problem: Skin Integrity:  Goal: Absence of new skin breakdown  Description: Absence of new skin breakdown  11/28/2020 1006 by Fred Kingston RN  Note: Skin assessment complete with no new signs of skin breakdown. Pt has scattered bruising/redness, redness to buttocks. Pt turns self frequently in bed. Ramirez in place. Problem: Falls - Risk of:  Goal: Will remain free from falls  Description: Will remain free from falls  11/28/2020 1006 by Fred Kingston RN  Outcome: Ongoing  Note: Pt is a fall risk. See Yusef Johnson Fall Score. Pt bed is in low position, side rails up, call light and belongings are in reach. Bed alarm is on. Pt encouraged to call for assistance. Will continue with hourly rounds for po intake, pain needs, toileting and repositioning as needed. Will continue to monitor for needs        Problem: Confusion - Acute:  Goal: Absence of continued neurological deterioration signs and symptoms  Description: Absence of continued neurological deterioration signs and symptoms  11/28/2020 1006 by Fred Kingston RN  Outcome: Ongoing  Note: Pt is oriented to self only. Pt frequently yelling out. Restless in bed.

## 2020-11-29 LAB
ALBUMIN SERPL-MCNC: 3.5 G/DL (ref 3.4–5)
ALBUMIN SERPL-MCNC: 3.6 G/DL (ref 3.4–5)
ALP BLD-CCNC: 129 U/L (ref 40–129)
ALT SERPL-CCNC: 422 U/L (ref 10–40)
ANION GAP SERPL CALCULATED.3IONS-SCNC: 16 MMOL/L (ref 3–16)
AST SERPL-CCNC: 711 U/L (ref 15–37)
BASOPHILS ABSOLUTE: 0 K/UL (ref 0–0.2)
BASOPHILS RELATIVE PERCENT: 0.2 %
BILIRUB SERPL-MCNC: 3.6 MG/DL (ref 0–1)
BILIRUBIN DIRECT: 2 MG/DL (ref 0–0.3)
BILIRUBIN, INDIRECT: 1.6 MG/DL (ref 0–1)
BUN BLDV-MCNC: 48 MG/DL (ref 7–20)
CALCIUM SERPL-MCNC: 8.7 MG/DL (ref 8.3–10.6)
CHLORIDE BLD-SCNC: 105 MMOL/L (ref 99–110)
CO2: 20 MMOL/L (ref 21–32)
CREAT SERPL-MCNC: 1.1 MG/DL (ref 0.8–1.3)
EOSINOPHILS ABSOLUTE: 0 K/UL (ref 0–0.6)
EOSINOPHILS RELATIVE PERCENT: 0 %
GFR AFRICAN AMERICAN: >60
GFR NON-AFRICAN AMERICAN: >60
GLUCOSE BLD-MCNC: 112 MG/DL (ref 70–99)
GLUCOSE BLD-MCNC: 136 MG/DL (ref 70–99)
GLUCOSE BLD-MCNC: 64 MG/DL (ref 70–99)
GLUCOSE BLD-MCNC: 88 MG/DL (ref 70–99)
GLUCOSE BLD-MCNC: 94 MG/DL (ref 70–99)
GLUCOSE BLD-MCNC: 94 MG/DL (ref 70–99)
GLUCOSE BLD-MCNC: 97 MG/DL (ref 70–99)
GLUCOSE BLD-MCNC: 98 MG/DL (ref 70–99)
GLUCOSE BLD-MCNC: 99 MG/DL (ref 70–99)
HCT VFR BLD CALC: 33.6 % (ref 40.5–52.5)
HCT VFR BLD CALC: 34.1 % (ref 40.5–52.5)
HEMOGLOBIN: 10.8 G/DL (ref 13.5–17.5)
HEMOGLOBIN: 11.1 G/DL (ref 13.5–17.5)
INR BLD: 4.42 (ref 0.86–1.14)
LYMPHOCYTES ABSOLUTE: 0.4 K/UL (ref 1–5.1)
LYMPHOCYTES RELATIVE PERCENT: 4.6 %
MAGNESIUM: 2 MG/DL (ref 1.8–2.4)
MCH RBC QN AUTO: 32.1 PG (ref 26–34)
MCH RBC QN AUTO: 32.2 PG (ref 26–34)
MCHC RBC AUTO-ENTMCNC: 32 G/DL (ref 31–36)
MCHC RBC AUTO-ENTMCNC: 32.5 G/DL (ref 31–36)
MCV RBC AUTO: 100.3 FL (ref 80–100)
MCV RBC AUTO: 99 FL (ref 80–100)
MONOCYTES ABSOLUTE: 1 K/UL (ref 0–1.3)
MONOCYTES RELATIVE PERCENT: 10.4 %
NEUTROPHILS ABSOLUTE: 8 K/UL (ref 1.7–7.7)
NEUTROPHILS RELATIVE PERCENT: 84.8 %
PDW BLD-RTO: 20.3 % (ref 12.4–15.4)
PDW BLD-RTO: 20.8 % (ref 12.4–15.4)
PERFORMED ON: ABNORMAL
PERFORMED ON: NORMAL
PHOSPHORUS: 2.6 MG/DL (ref 2.5–4.9)
PLATELET # BLD: 27 K/UL (ref 135–450)
PLATELET # BLD: 30 K/UL (ref 135–450)
PMV BLD AUTO: 10.1 FL (ref 5–10.5)
PMV BLD AUTO: 9.5 FL (ref 5–10.5)
POTASSIUM SERPL-SCNC: 3.9 MMOL/L (ref 3.5–5.1)
PROTHROMBIN TIME: 52 SEC (ref 10–13.2)
RBC # BLD: 3.35 M/UL (ref 4.2–5.9)
RBC # BLD: 3.45 M/UL (ref 4.2–5.9)
SODIUM BLD-SCNC: 141 MMOL/L (ref 136–145)
TOTAL PROTEIN: 5.9 G/DL (ref 6.4–8.2)
WBC # BLD: 9.3 K/UL (ref 4–11)
WBC # BLD: 9.5 K/UL (ref 4–11)

## 2020-11-29 PROCEDURE — 6360000002 HC RX W HCPCS: Performed by: INTERNAL MEDICINE

## 2020-11-29 PROCEDURE — 94669 MECHANICAL CHEST WALL OSCILL: CPT

## 2020-11-29 PROCEDURE — 2060000000 HC ICU INTERMEDIATE R&B

## 2020-11-29 PROCEDURE — 85025 COMPLETE CBC W/AUTO DIFF WBC: CPT

## 2020-11-29 PROCEDURE — 94640 AIRWAY INHALATION TREATMENT: CPT

## 2020-11-29 PROCEDURE — 6360000002 HC RX W HCPCS: Performed by: STUDENT IN AN ORGANIZED HEALTH CARE EDUCATION/TRAINING PROGRAM

## 2020-11-29 PROCEDURE — 83735 ASSAY OF MAGNESIUM: CPT

## 2020-11-29 PROCEDURE — 2580000003 HC RX 258: Performed by: INTERNAL MEDICINE

## 2020-11-29 PROCEDURE — 6370000000 HC RX 637 (ALT 250 FOR IP): Performed by: STUDENT IN AN ORGANIZED HEALTH CARE EDUCATION/TRAINING PROGRAM

## 2020-11-29 PROCEDURE — 2580000003 HC RX 258: Performed by: STUDENT IN AN ORGANIZED HEALTH CARE EDUCATION/TRAINING PROGRAM

## 2020-11-29 PROCEDURE — 83036 HEMOGLOBIN GLYCOSYLATED A1C: CPT

## 2020-11-29 PROCEDURE — 99231 SBSQ HOSP IP/OBS SF/LOW 25: CPT | Performed by: SURGERY

## 2020-11-29 PROCEDURE — 85610 PROTHROMBIN TIME: CPT

## 2020-11-29 PROCEDURE — 80076 HEPATIC FUNCTION PANEL: CPT

## 2020-11-29 PROCEDURE — 80069 RENAL FUNCTION PANEL: CPT

## 2020-11-29 PROCEDURE — 51702 INSERT TEMP BLADDER CATH: CPT

## 2020-11-29 PROCEDURE — 36415 COLL VENOUS BLD VENIPUNCTURE: CPT

## 2020-11-29 PROCEDURE — 85027 COMPLETE CBC AUTOMATED: CPT

## 2020-11-29 PROCEDURE — 2500000003 HC RX 250 WO HCPCS: Performed by: INTERNAL MEDICINE

## 2020-11-29 RX ORDER — DEXTROSE MONOHYDRATE 50 MG/ML
100 INJECTION, SOLUTION INTRAVENOUS PRN
Status: DISCONTINUED | OUTPATIENT
Start: 2020-11-29 | End: 2020-11-29 | Stop reason: SDUPTHER

## 2020-11-29 RX ORDER — DEXTROSE MONOHYDRATE 25 G/50ML
12.5 INJECTION, SOLUTION INTRAVENOUS PRN
Status: DISCONTINUED | OUTPATIENT
Start: 2020-11-29 | End: 2020-11-29 | Stop reason: SDUPTHER

## 2020-11-29 RX ORDER — POTASSIUM CHLORIDE 7.45 MG/ML
10 INJECTION INTRAVENOUS
Status: COMPLETED | OUTPATIENT
Start: 2020-11-29 | End: 2020-11-29

## 2020-11-29 RX ORDER — SODIUM CHLORIDE FOR INHALATION 3 %
15 VIAL, NEBULIZER (ML) INHALATION
Status: DISCONTINUED | OUTPATIENT
Start: 2020-11-29 | End: 2020-11-30

## 2020-11-29 RX ORDER — NICOTINE POLACRILEX 4 MG
15 LOZENGE BUCCAL PRN
Status: DISCONTINUED | OUTPATIENT
Start: 2020-11-29 | End: 2020-11-29 | Stop reason: SDUPTHER

## 2020-11-29 RX ORDER — INSULIN LISPRO 100 [IU]/ML
0-6 INJECTION, SOLUTION INTRAVENOUS; SUBCUTANEOUS EVERY 4 HOURS
Status: DISCONTINUED | OUTPATIENT
Start: 2020-11-29 | End: 2020-11-30 | Stop reason: HOSPADM

## 2020-11-29 RX ORDER — HALOPERIDOL 5 MG/ML
2 INJECTION INTRAMUSCULAR EVERY 12 HOURS PRN
Status: DISCONTINUED | OUTPATIENT
Start: 2020-11-29 | End: 2020-11-30

## 2020-11-29 RX ORDER — IPRATROPIUM BROMIDE AND ALBUTEROL SULFATE 2.5; .5 MG/3ML; MG/3ML
1 SOLUTION RESPIRATORY (INHALATION)
Status: DISCONTINUED | OUTPATIENT
Start: 2020-11-29 | End: 2020-11-30 | Stop reason: HOSPADM

## 2020-11-29 RX ADMIN — SODIUM CHLORIDE 30 MG/ML INHALATION SOLUTION 15 ML: 30 SOLUTION INHALANT at 08:57

## 2020-11-29 RX ADMIN — SODIUM CHLORIDE 30 MG/ML INHALATION SOLUTION 15 ML: 30 SOLUTION INHALANT at 20:33

## 2020-11-29 RX ADMIN — ALBUTEROL SULFATE 2.5 MG: 2.5 SOLUTION RESPIRATORY (INHALATION) at 00:31

## 2020-11-29 RX ADMIN — DEXTROSE MONOHYDRATE 100 ML/HR: 50 INJECTION, SOLUTION INTRAVENOUS at 17:20

## 2020-11-29 RX ADMIN — MEROPENEM 1 G: 1 INJECTION, POWDER, FOR SOLUTION INTRAVENOUS at 21:48

## 2020-11-29 RX ADMIN — IPRATROPIUM BROMIDE AND ALBUTEROL SULFATE 1 AMPULE: .5; 3 SOLUTION RESPIRATORY (INHALATION) at 08:56

## 2020-11-29 RX ADMIN — SODIUM CHLORIDE 30 MG/ML INHALATION SOLUTION 15 ML: 30 SOLUTION INHALANT at 17:08

## 2020-11-29 RX ADMIN — POTASSIUM CHLORIDE 10 MEQ: 7.46 INJECTION, SOLUTION INTRAVENOUS at 09:34

## 2020-11-29 RX ADMIN — IPRATROPIUM BROMIDE AND ALBUTEROL SULFATE 1 AMPULE: .5; 3 SOLUTION RESPIRATORY (INHALATION) at 17:06

## 2020-11-29 RX ADMIN — SODIUM BICARBONATE: 84 INJECTION, SOLUTION INTRAVENOUS at 15:03

## 2020-11-29 RX ADMIN — IPRATROPIUM BROMIDE AND ALBUTEROL SULFATE 1 AMPULE: .5; 3 SOLUTION RESPIRATORY (INHALATION) at 12:30

## 2020-11-29 RX ADMIN — DEXTROSE MONOHYDRATE 12.5 G: 25 INJECTION, SOLUTION INTRAVENOUS at 04:23

## 2020-11-29 RX ADMIN — MEROPENEM 1 G: 1 INJECTION, POWDER, FOR SOLUTION INTRAVENOUS at 05:43

## 2020-11-29 RX ADMIN — SODIUM BICARBONATE: 84 INJECTION, SOLUTION INTRAVENOUS at 05:40

## 2020-11-29 RX ADMIN — IPRATROPIUM BROMIDE AND ALBUTEROL SULFATE 1 AMPULE: .5; 3 SOLUTION RESPIRATORY (INHALATION) at 20:30

## 2020-11-29 RX ADMIN — DEXTROSE MONOHYDRATE 100 ML/HR: 50 INJECTION, SOLUTION INTRAVENOUS at 04:29

## 2020-11-29 RX ADMIN — Medication 10 ML: at 20:27

## 2020-11-29 RX ADMIN — MEROPENEM 1 G: 1 INJECTION, POWDER, FOR SOLUTION INTRAVENOUS at 13:26

## 2020-11-29 RX ADMIN — SODIUM CHLORIDE 30 MG/ML INHALATION SOLUTION 15 ML: 30 SOLUTION INHALANT at 12:31

## 2020-11-29 RX ADMIN — MUPIROCIN: 20 OINTMENT TOPICAL at 09:33

## 2020-11-29 ASSESSMENT — PAIN SCALES - PAIN ASSESSMENT IN ADVANCED DEMENTIA (PAINAD)
CONSOLABILITY: 0
BREATHING: 0
NEGVOCALIZATION: 0
BODYLANGUAGE: 0
FACIALEXPRESSION: 0
TOTALSCORE: 0
NEGVOCALIZATION: 0
TOTALSCORE: 0
FACIALEXPRESSION: 0
CONSOLABILITY: 0
BODYLANGUAGE: 0
BREATHING: 0
BREATHING: 0
NEGVOCALIZATION: 0
BODYLANGUAGE: 0
FACIALEXPRESSION: 0
BREATHING: 0
TOTALSCORE: 0
NEGVOCALIZATION: 0
TOTALSCORE: 0
FACIALEXPRESSION: 0
CONSOLABILITY: 0
CONSOLABILITY: 0
BODYLANGUAGE: 0

## 2020-11-29 ASSESSMENT — PAIN SCALES - GENERAL
PAINLEVEL_OUTOF10: 0
PAINLEVEL_OUTOF10: 0

## 2020-11-29 NOTE — PROGRESS NOTES
Rapid response called at 1126 pt pulled out cantor cath and had large amount of blood flowing out of his penis. Pt has platelet level of 27 and RN couldn't get bleeding to stop. RRT called. PT's vs are 136/89 RR 22, Hr 81. Pt responding to his name, IV started in his LLE. 22g. 22g IV started in his R forearm. Pt placed on 6L NC, while attempting to read oxygen saturation. Pt having labored breathing, edematous. RN's holding pressure around penis to help occuld bleeding. Pt was titrated down to 2L NC. Oxygen saturation in 90%. Pt's /92 HR 81, RR 21, Spo2- 98% on 2L. Pt stopped bleeding, VS as charted, Pt cleaned and repositioned by nursing staff. Urology consult called by [facility].

## 2020-11-29 NOTE — PROGRESS NOTES
Hospitalist Progress Note      PCP: Lenin THACKER    Date of Admission: 11/22/2020    Chief Complaint: Abdominal pain    Hospital Course: This is a 63-year-old male with a history of Parkinson's disease, coronary artery disease, diabetes mellitus type 2, hypertension, CHF, A. fib admitted with worsening right upper quadrant abdominal pain, acute cholecystitis/sepsis receiving IV antibiotics surgery, GI consulted recommending to consult Magaly Patel for ERCP.     Subjective:    Patient seen and examined  Patient is still confused he pulled out his Ramirez catheter today leading to bleeding from his penis, urology staff consulted, to placing Ramirez catheter, urine bloody  -In restraints now, will add Haldol as needed, worried with hepatic dysfunction  -Discussed with surgery, consulting IR for  intervention likely tomorrow, once platelets are better  -Send page  to heme-onc  -On bicarb drip, bicarb of 20  -CT head yesterday without acute abnormality    Medications:  Reviewed    Infusion Medications    IV infusion builder 100 mL/hr at 11/29/20 1503    dextrose 100 mL/hr (11/29/20 0429)     Scheduled Medications    sodium chloride (Inhalant)  15 mL Nebulization Q4H While awake    ipratropium-albuterol  1 ampule Inhalation Q4H WA    insulin glargine  12 Units Subcutaneous Nightly    hydrocortisone  30 mg Oral Nightly    metoprolol tartrate  25 mg Oral BID    meropenem  1 g Intravenous Q8H    [Held by provider] polyethylene glycol  17 g Oral Daily    carbidopa-levodopa  1 tablet Oral TID    cyanocobalamin  1,000 mcg Oral Daily    digoxin  125 mcg Oral Daily    docusate sodium  100 mg Oral BID    linaclotide  145 mcg Oral QAM AC    mupirocin   Topical Daily    tamsulosin  0.4 mg Oral Nightly    traZODone  200 mg Oral Nightly    lamoTRIgine  25 mg Oral BID    atorvastatin  20 mg Oral Nightly    sodium chloride flush  10 mL Intravenous 2 times per day    hydrocortisone  30 mg Oral Daily     PRN Meds: haloperidol lactate, albuterol, guaiFENesin, magnesium hydroxide, bisacodyl, sodium chloride flush, polyethylene glycol, promethazine **OR** ondansetron, [Held by provider] LORazepam, glucose, dextrose, glucagon (rDNA), dextrose      Intake/Output Summary (Last 24 hours) at 11/29/2020 1527  Last data filed at 11/29/2020 1358  Gross per 24 hour   Intake 4869.91 ml   Output 700 ml   Net 4169.91 ml       Physical Exam Performed:    /86   Pulse 83   Temp 98.1 °F (36.7 °C) (Rectal)   Resp 24   Ht 6' 1\" (1.854 m)   Wt 223 lb 8.7 oz (101.4 kg)   SpO2 96%   BMI 29.49 kg/m²     Medication eyes:  General appearance: No apparent distress, appears stated age and cooperative. HEENT: Pupils equal, round, and reactive to light. Conjunctivae/corneas clear. Neck: Supple, with full range of motion. No jugular venous distention. Trachea midline. Respiratory:  Normal respiratory effort. Clear to auscultation, bilaterally without Rales/Wheezes/Rhonchi. Cardiovascular: Regular rate and rhythm with normal S1/S2 without murmurs, rubs or gallops. Abdomen: Soft, non-tender, non-distended with normal bowel sounds. Musculoskeletal: No clubbing, cyanosis or edema bilaterally. Full range of motion without deformity. Skin: Skin color, texture, turgor normal.  No rashes or lesions. Neurologic:  Neurovascularly intact without any focal sensory/motor deficits.  Cranial nerves: II-XII intact, grossly non-focal.  Psychiatric: Disoriented   capillary Refill: Brisk,< 3 seconds   Peripheral Pulses: +2 palpable, equal bilaterally     Labs:   Recent Labs     11/28/20  0522 11/29/20  0526 11/29/20  1301   WBC 8.5 9.5 9.3   HGB 11.5* 11.1* 10.8*   HCT 36.0* 34.1* 33.6*   PLT 34* 27* 30*     Recent Labs     11/27/20  0440 11/27/20  1711 11/28/20  0522 11/29/20  0526    140 141 141   K 4.4 4.4 3.7 3.9    103 105 105   CO2 21 20* 14* 20*   BUN 42* 42* 45* 48*   CREATININE 0.9 1.0 1.1 1.1   CALCIUM 9.0 9.3 8.8 8.7   PHOS 2.6  -- --  2.6     Recent Labs     11/27/20  0440 11/27/20  1711 11/28/20  0522 11/29/20  0526   * 874* 779* 711*   * 424* 582* 422*   BILIDIR 1.6*  --  1.8* 2.0*   BILITOT 3.1* 3.4* 3.2* 3.6*   ALKPHOS 131* 137* 125 129     Recent Labs     11/27/20  0851 11/28/20  0956 11/29/20  1301   INR 3.28* 3.55* 4.42*     No results for input(s): Taz Snowball in the last 72 hours. Urinalysis:      Lab Results   Component Value Date    NITRU Negative 11/28/2020    WBCUA 6-9 11/28/2020    BACTERIA 2+ 11/28/2020    RBCUA 11-20 11/28/2020    BLOODU LARGE 11/28/2020    SPECGRAV >=1.030 11/28/2020    GLUCOSEU Negative 11/28/2020       Radiology:  CT HEAD WO CONTRAST   Final Result      No acute intracranial hemorrhage or mass effect. XR CHEST PORTABLE   Final Result      Few patchy left basilar opacities, likely atelectasis, but developing infection cannot be excluded. CT ABDOMEN PELVIS W IV CONTRAST Additional Contrast? None   Final Result      Cholelithiasis with gallbladder wall thickening and pericholecystic fluid consistent with acute cholecystitis. Please correlate clinically. Small amount of free ascitic fluid. Moderate bilateral effusions with compressive atelectasis and cardiac enlargement as well as evidence of right heart failure. Small kidneys bilaterally with simple cyst in the left kidney. Severe fatty infiltration of the liver. Radiation seeds in the prostate                    Assessment/Plan:    Active Hospital Problems    Diagnosis    Cholecystitis [K81.9]     1. Sepsis secondary to acute cholecystitis concern for possible cholangitis, surgery and GI consulted and following. Planning to treat with IV antibiotics . percutaneous cholecystotomy on hold per surgery, consulted IR today for procedure    2. Transaminitis secondary to above, follow-up with the daily CMP. 3.  A. fib :-on beta-blocker and digoxin cardiology consulted and following.   INR still high    4. Coagulopathy and thrombocytopenia secondary to sepsis and DIC,  follow closely. Hemoccult consulted. Please refer to heme-onc notes from 11/25. Consider transfusing platelets if urine does not clear up. 5.  Parkinson disease, per wife there is no official diagnosis,  continue with home medication. Consider neurology consult if no improvement    6. Adrenal insufficiency:- history of chronic steroids use , on  stress dose steroid currently. 7.  History of TAVR cardiology following. Patient is on Eliquis. Eliquis has been on hold since admission due to coagulopathy. will need to be back on anticoagulation when possible. 8.  Diabetes mellitus type 2 with elevated blood sugar secondary to steroid increase basal insulin, hemoglobin A1c= 7.4 on 11/22/2020.    9. Severe anion gap metabolic acidosis with lactic acidosis:-Anion gap is improved  Nephrology following  Decreased urine output    10. History of congestive heart failure with ejection fraction of 20 to 25%:-Cardiology consulted as above, signed off    DVT Prophylaxis: Contraindicated as elevated INR    Diet: Diet NPO Effective Now  Code Status: DNR-CCA    PT/OT Eval Status: n/a    Dispo - inpatient , low threshold for transfer to ICU.      Antonio Potter MD

## 2020-11-29 NOTE — PROGRESS NOTES
Speech Language Pathology  Dysphagia - hold    Order received, chart reviewed, d/w RN who states pt not appropriate for evaluation this date as not able to maintain alertness. Will follow up next session as pt appropriate      Chana De Paz M.S./[facility]-SLP #[id number]  Pg.  # [phone]

## 2020-11-29 NOTE — CONSULTS
Patient Name: Eleonora Marks  : 1933  Age: 80 y.o. Sex: male    INDICATION FOR CONSULT: urethral bleeding     History of Present Illness: 81yo M admitted several days ago with sepsis from acute cholecystitis. Not felt to be a surgical candidate. Pending IR placement of cholecystostomy tube. Also with coagulopathy from DIC. He had indwelling cantor cath placed for urinary retention per nurse. Patient was confused this AM, pulling his cath out with balloon inflated. This resulted in immediate severe urethral bleeding without associated change in hemodynamics. Unable to obtain any history from patient          ALLERGY:  Allergies   Allergen Reactions    Ramipril Swelling     Possible angioedema  Patient doesn't like the way it feels      Gabapentin     Morphine        HOME MEDICATIONS:  Medications Prior to Admission: venlafaxine (EFFEXOR XR) 37.5 MG extended release capsule, Take 37.5 mg by mouth daily  LORazepam (ATIVAN) 1 MG tablet, Take 1 mg by mouth nightly as needed for Anxiety (0.5-1 mg at bedtime as needed). oxyCODONE (ROXICODONE) 5 MG immediate release tablet, Take 5 mg by mouth every 6 hours as needed for Pain.   Insulin Aspart (NOVOLOG FLEXPEN SC), Inject 10 Units into the skin Tid with meals   (in addition to sliding scale)  vitamin D 25 MCG (1000 UT) CAPS, Take 1,000 Units by mouth daily  guaiFENesin (MUCINEX) 600 MG extended release tablet, Take 1,200 mg by mouth 2 times daily as needed for Congestion  lamoTRIgine (LAMICTAL) 25 MG tablet, Take 1 tablet by mouth 2 times daily (Patient taking differently: Take 25 mg by mouth 2 times daily 0700 and 1600)  atorvastatin (LIPITOR) 20 MG tablet, Take 1 tablet by mouth nightly  bisacodyl (DULCOLAX) 10 MG suppository, Place 10 mg rectally daily as needed for Constipation  carbidopa-levodopa (SINEMET)  MG per tablet, Take 1 tablet by mouth 3 times daily  cyanocobalamin 1000 MCG tablet, Take 1,000 mcg by mouth daily  digoxin (LANOXIN) 125 MCG tablet, Take 125 mcg by mouth daily At his facility, they hold it if HR < 60.  docusate sodium (COLACE) 100 MG capsule, Take 100 mg by mouth 2 times daily  apixaban (ELIQUIS) 5 MG TABS tablet, Take 5 mg by mouth 2 times daily  hydrocortisone (CORTEF) 5 MG tablet, Take 15 mg by mouth daily 15 mg at 0700  hydrocortisone (CORTEF) 5 MG tablet, Take 7.5 mg by mouth nightly 7.5 mg at 1900  linaclotide (LINZESS) 145 MCG capsule, Take 145 mcg by mouth every morning (before breakfast)  menthol-zinc oxide (CALMOSEPTINE) 0.44-20.625 % OINT ointment, Apply topically daily Max 30 ml per day. Apply to coccyx. mupirocin (BACTROBAN) 2 % ointment, Apply topically daily Apply to R leg daily  B Complex-C-E-Zn (STRESS FORMULA/ZINC PO), Take 1 tablet by mouth daily  tamsulosin (FLOMAX) 0.4 MG capsule, Take 0.4 mg by mouth nightly  traZODone (DESYREL) 100 MG tablet, Take 200 mg by mouth nightly  Insulin Degludec (TRESIBA FLEXTOUCH) 100 UNIT/ML SOPN, Inject 14 Units into the skin daily   metoprolol tartrate (LOPRESSOR) 50 MG tablet, Take 25 mg by mouth daily At 8 a.m. at his facility.   insulin aspart (NOVOLOG) 100 UNIT/ML injection vial, Inject 0-9 Units into the skin 3 times daily (before meals) 150-179 (2 units)  280-209 (3 untis)  210-239 (4 units)  240-269 (5 units)  270-299 (6 units)   300-329 (7 units)   330-359 (8 units)  360-389 ( 9 units)  magnesium hydroxide (MILK OF MAGNESIA) 400 MG/5ML suspension, Take 30 mLs by mouth daily as needed for Constipation  polyethylene glycol (GLYCOLAX) packet, Take 17 g by mouth daily   acetaminophen (TYLENOL) 325 MG tablet, Take 650 mg by mouth every 4 hours as needed for Pain   [DISCONTINUED] DULoxetine (CYMBALTA) 60 MG extended release capsule, Take 60 mg by mouth every evening       Past Medical History:   Past Medical History:   Diagnosis Date    Arthritis     Back pain     CAD (coronary artery disease)     Cancer (HCC)     prostate    Diabetes mellitus ([facility] [address]     Hyperlipidemia     Hypertension     Rosacea          Past Surgical History:   Past Surgical History:   Procedure Laterality Date    BACK SURGERY      CATARACT REMOVAL WITH IMPLANT      COLONOSCOPY  9/21/11    COLONOSCOPY  2/11/2015    polyp, diverticulosis    CORONARY ANGIOPLASTY WITH STENT PLACEMENT      CORONARY ARTERY BYPASS GRAFT      EYE SURGERY      JOINT REPLACEMENT      knee    PROSTATE SURGERY      seed implants    SHOULDER SURGERY           Family History:  Family History   Problem Relation Age of Onset    Cancer Mother     Cancer Sister        IMMUNIZATIONS:  Immunization History   Administered Date(s) Administered    Tdap (Boostrix, Adacel) 06/30/2020       SOCIAL History:  Social History     Socioeconomic History    Marital status:      Spouse name: Not on file    Number of children: Not on file    Years of education: Not on file    Highest education level: Not on file   Occupational History    Not on file   Social Needs    Financial resource strain: Not on file    Food insecurity     Worry: Not on file     Inability: Not on file    Transportation needs     Medical: Not on file     Non-medical: Not on file   Tobacco Use    Smoking status: Never Smoker    Smokeless tobacco: Never Used   Substance and Sexual Activity    Alcohol use: No    Drug use: Never    Sexual activity: Never   Lifestyle    Physical activity     Days per week: Not on file     Minutes per session: Not on file    Stress: Not on file   Relationships    Social connections     Talks on phone: Not on file     Gets together: Not on file     Attends [employer] service: Not on file     Active member of club or organization: Not on file     Attends meetings of clubs or organizations: Not on file     Relationship status: Not on file    Intimate partner violence     Fear of current or ex partner: Not on file     Emotionally abused: Not on file     Physically abused: Not on file     Forced sexual activity: Not on file   Other catch   Result Value Ref Range    Organism Enterococcus faecalis (A)     Urine Culture, Routine 75,000 CFU/ml        Susceptibility    Enterococcus faecalis - BACTERIAL SUSCEPTIBILITY PANEL BY HEIDY     ampicillin <=2 Sensitive mcg/mL     nitrofurantoin <=16 Sensitive mcg/mL     tetracycline >=16 Resistant mcg/mL     vancomycin 1 Sensitive mcg/mL       BLOOD CULTURE  No results found for this or any previous visit. No results found for this or any previous visit. IMPRESSION:  79yo M with urethral bleeding and now mild gross hematuria after traumatic self removal of catheter and subsequent catheter replacement by nurse per my advice. Patient now in restraints     PLAN:  1) Urethral bleeding:  -Secondary to trauma + coagulopathy  -Will hopefully improve with time and if coagulopathy can be corrected after treatment of acute desi    2) Gross hematuria:  -As above. -Manual irrigation of catheter Q shift and PRN. No clots on my eval this afternoon.   Flowing well    Keep restraints on to avoid further self harm    Latricia Weston MD  11/29/2020

## 2020-11-29 NOTE — PLAN OF CARE
Problem: Confusion - Acute:  Goal: Absence of continued neurological deterioration signs and symptoms  Description: Absence of continued neurological deterioration signs and symptoms  Outcome: Ongoing  Note: Pt remains confused. Alert to self only. Pt restless. Problem: Restraint Use - Nonviolent/Non-Self-Destructive Behavior:  Goal: Absence of restraint indications  Description: Absence of restraint indications  Outcome: Ongoing  Note: Pt in bilat wrist restraints d/t pulling cantor catheter out. Pt cantor replaced. No injury noted. ROM performed. Pt unable to verbalize requirements to have restraints removed.

## 2020-11-29 NOTE — PROGRESS NOTES
0430: BS 64, IV dextrose given per hypoglycemia protocol. D5 running at 100/hr.     0450: .     0600: BS 97. D5 running at 100/hr. Pt resting quietly in bed. Will continue to monitor.

## 2020-11-29 NOTE — PROGRESS NOTES
Wheezing noted on assessment. 02 sats above 90% on RA. Respiratory called to do prn breathing tx. Pt also noted to not be alert enough to take PO meds. Pt choked on a sip of water. Lungs CTA. Dr. Tarsha Rivera notified via perfect serve. Orders placed for NPO and SLP consult. Will continue to monitor.

## 2020-11-29 NOTE — PROGRESS NOTES
Surgery Daily Progress Note      CC: Acute cholecystitis     Subjective :  Patient with episode of choking with pills yesterday evening, made NPO. States he feels ok this am, reports appetite, denies abdominal pain. Voiding appropriately. Was able to tolerate some liquids yesterday. ROS: A 14 point review of systems was conducted, significant findings as noted above. All other systems negative. Objective     Exam:  Vitals:    11/28/20 1542 11/28/20 1952 11/29/20 0014 11/29/20 0420   BP: (!) 149/91 (!) 124/91 (!) 140/89 139/88   Pulse: 83 71 62 82   Resp: 20 22 24 22   Temp: 96.7 °F (35.9 °C) 97.6 °F (36.4 °C) 97 °F (36.1 °C) 97.8 °F (36.6 °C)   TempSrc: Axillary Rectal Rectal Rectal   SpO2: 94% 97% 95% 96%   Weight:       Height:         General appearance: alert, no acute distress, grooming appropriate  Neuro: no focal deficits, sensation intact  HEENT: trachea midline  Chest/Lungs: normal effort, no accessory muscle use, on RA  Cardiovascular: RRR, normotensive  Abdomen: soft, non-tender, non-distended, no guarding/rigidity, incisions - C/D/I, well approximated. : Cantor in place - clear yellow urine/no cantor  Extremities: no edema, no cyanosis    ASSESSMENT/PLAN:   This is a 80 y.o. male who presented with a 10 day hx of RUQ pain. CT abd shows cholelithiasis with gallbladder wall thickening and pericholecystic fluid consistent with actue cholecystitis. - Follow up SLP after choking episode, ok for diet readvance to clears if cleared by SLP.  - Patient is clinically stable, tolerated clears, INR remained high.  We will hold on perc cholecystostomy at this point.   - Continue antibiotics  - Heme/onc states no additional recommendations for reversal of INR   - medical management per primary at this time  - aggressive pulm toilet and IS for wheezing    Dale Haskins MD  11/29/20  6:50 AM  [phone]

## 2020-11-29 NOTE — PROGRESS NOTES
[facility]    [facility]              [phone]                          Interval History and plan:      Urine output is 675 mL  Blood pressure is stable    Continue hydrocortisone  30 mg p.o. twice daily. Lactic acidosis from liver disease and sepsis  Continue with IV fluid. Creatinine stable. Yesterday bicarbonate dropped down to 14 and we started IV fluid with bicarbonate. Anion gap is back to normal.  Lactic acid is 3.4. Assessment :     Severe anion gap metabolic acidosis with lactic acidosis:  Lactate corrected, evaluation is 9.0 with anion gap of 21. He also has mild hyperkalemia with hyponatremia. Blood pressure is stable. Urine output is not accurately measured is about 450 mL. He is afebrile. Acute cholecystitis:  He is on meropenem  Surgery is on the case. CT scan showed gallstones and pericholecystic fluid consistent with acute cholecystitis. Bilateral kidneys are small and simple cyst in the left kidney. Adrenal insufficiency:  He is on hydrocortisone which will be continued. Patient does have history of congestive heart failure with EF of 20 to 25%. [facility] would like to thank Jimena Marquez MD   for opportunity to serve this patient      Please call with questions at-   24 Hrs Answering service [phone] or  7 am- 5 pm via Perfect serve or cell phone  Zafar Mariscal          CC/reason for consult :     Acidosis and hyperkalemia. HPI :     Odell Mo is a 80 y.o. male presented to   the hospital on 11/22/2020 with abdominal pain. He has past medical history of hypertension, hyperlipidemia and osteoarthritis. He presented with abdominal pain. He also history of Parkinson's disease. His pain is in the right upper quadrant for about 1 week. It is not associated with any nausea and vomiting.   He lives in a nursing facility and was told to come to the hospital.    He is now admitted for acute cholecystitis and is n.p.o. He was on IV fluid which was switched to lactated Ringer's. He is also placed on Zosyn. Surgery is consulted for percutaneous cholecystectomy. He has history of adrenal insufficiency and is on home steroids. I was called for further management of worsening lactic acidosis and hyperkalemia. ROS:     Seen with-student    positives in bold   Constitutional:  fever, chills, weakness, weight change, fatigue  Skin:  rash, pruritus, hair loss, bruising, dry skin, petechiae  Head, Face, Neck   headaches, swelling,  cervical adenopathy  Respiratory: shortness of breath, cough, or wheezing  Cardiovascular: chest pain, palpitations, dizzy, edema  Gastrointestinal: nausea, vomiting, diarrhea, constipation,belly pain    Yellow skin, blood in stool  Musculoskeletal:  back pain, muscle weakness, gait problems,       joint pain or swelling. Genitourinary:  dysuria, poor urine flow, flank pain, blood in urine  Neurologic:  vertigo, TIA'S, syncope, seizures, focal weakness  Psychosocial:  insomnia, anxiety, or depression. All other remaining systems are negative or unable to obtain        PMH/PSH/SH/Family History:     Past Medical History:   Diagnosis Date    Arthritis     Back pain     CAD (coronary artery disease)     Cancer (HCC)     prostate    Diabetes mellitus ([facility] [address]     Hyperlipidemia     Hypertension     Rosacea        Past Surgical History:   Procedure Laterality Date    BACK SURGERY      CATARACT REMOVAL WITH IMPLANT      COLONOSCOPY  9/21/11    COLONOSCOPY  2/11/2015    polyp, diverticulosis    CORONARY ANGIOPLASTY WITH STENT PLACEMENT      CORONARY ARTERY BYPASS GRAFT      EYE SURGERY      JOINT REPLACEMENT      knee    PROSTATE SURGERY      seed implants    SHOULDER SURGERY          reports that he has never smoked. He has never used smokeless tobacco. He reports that he does not drink alcohol or use drugs.     family history includes Cancer in his mother and sister.          Medication:     Current Facility-Administered Medications: sodium chloride (Inhalant) 3 % nebulizer solution 15 mL, 15 mL, Nebulization, Q4H While awake  ipratropium-albuterol (DUONEB) nebulizer solution 1 ampule, 1 ampule, Inhalation, Q4H WA  albuterol (PROVENTIL) nebulizer solution 2.5 mg, 2.5 mg, Nebulization, Q6H PRN  sodium bicarbonate 75 mEq in sodium chloride 0.45 % 1,000 mL infusion, , Intravenous, Continuous  insulin glargine (LANTUS;BASAGLAR) injection pen 12 Units, 12 Units, Subcutaneous, Nightly  hydrocortisone (CORTEF) tablet 30 mg, 30 mg, Oral, Nightly  metoprolol tartrate (LOPRESSOR) tablet 25 mg, 25 mg, Oral, BID  guaiFENesin (MUCINEX) extended release tablet 600 mg, 600 mg, Oral, BID PRN  meropenem (MERREM) 1 g in sodium chloride 0.9 % 100 mL IVPB (mini-bag), 1 g, Intravenous, Q8H  magnesium hydroxide (MILK OF MAGNESIA) 400 MG/5ML suspension 30 mL, 30 mL, Oral, Daily PRN  [Held by provider] polyethylene glycol (GLYCOLAX) packet 17 g, 17 g, Oral, Daily  bisacodyl (DULCOLAX) suppository 10 mg, 10 mg, Rectal, Daily PRN  carbidopa-levodopa (SINEMET)  MG per tablet 1 tablet, 1 tablet, Oral, TID  vitamin B-12 (CYANOCOBALAMIN) tablet 1,000 mcg, 1,000 mcg, Oral, Daily  digoxin (LANOXIN) tablet 125 mcg, 125 mcg, Oral, Daily  docusate sodium (COLACE) capsule 100 mg, 100 mg, Oral, BID  linaclotide (LINZESS) capsule 145 mcg, 145 mcg, Oral, QAM AC  mupirocin (BACTROBAN) 2 % ointment, , Topical, Daily  tamsulosin (FLOMAX) capsule 0.4 mg, 0.4 mg, Oral, Nightly  traZODone (DESYREL) tablet 200 mg, 200 mg, Oral, Nightly  lamoTRIgine (LAMICTAL) tablet 25 mg, 25 mg, Oral, BID  atorvastatin (LIPITOR) tablet 20 mg, 20 mg, Oral, Nightly  sodium chloride flush 0.9 % injection 10 mL, 10 mL, Intravenous, 2 times per day  sodium chloride flush 0.9 % injection 10 mL, 10 mL, Intravenous, PRN  polyethylene glycol (GLYCOLAX) packet 17 g, 17 g, Oral, Daily PRN  promethazine (PHENERGAN)

## 2020-11-29 NOTE — PLAN OF CARE
Problem: Skin Integrity:  Goal: Absence of new skin breakdown  Description: Absence of new skin breakdown  11/29/2020 0139 by Justyn Cochran RN  Outcome: Ongoing  11/29/2020 0131 by Justyn Cochran RN  Note: Pt at risk for skin breakdown. Skin assessment complete. No signs of skin breakdown. Pts skin cleansed with inc cleanser. Cantor draining well, cantor care done q shift. Zinc cream applied to buttocks. Pt repositioned in bed with pillow support. Will continue to monitor. Problem: Falls - Risk of:  Goal: Will remain free from falls  Description: Will remain free from falls  11/29/2020 0139 by Justyn Cochran RN  Outcome: Met This Shift  11/29/2020 0131 by Justyn Cochran RN  Note: Patient at risk for falls due to confusion. Patient resting quietly in bed. Side rails up x3. Bed locked in lowest position. Bed alarm on. Bedside table and call light within reach. Camera in room for safety and pulling at lines. Hourly rounding in place, no attempts to get up on own. Will continue to monitor. Problem: Pain:  Goal: Control of acute pain  Description: Control of acute pain  11/29/2020 0131 by Justyn Cochran RN  Note: Pt has abd tenderness. Surgery unable to intervene until INR is decreased. Heat applied to area. Pt repositioned freqently Will continue to assess. Problem: Confusion - Acute:  Goal: Mental status will be restored to baseline  Description: Mental status will be restored to baseline  Note: Pt is alert x 1 to self. Pt is easily re-oriented and follows commands at times. Pt is constantly yelling out, swinging legs over side of bed, and pulling at cantor. Camera in room for safety. Will continue to monitor.

## 2020-11-30 VITALS
BODY MASS INDEX: 29.63 KG/M2 | TEMPERATURE: 99 F | DIASTOLIC BLOOD PRESSURE: 74 MMHG | OXYGEN SATURATION: 94 % | SYSTOLIC BLOOD PRESSURE: 133 MMHG | RESPIRATION RATE: 18 BRPM | WEIGHT: 223.55 LBS | HEIGHT: 73 IN | HEART RATE: 79 BPM

## 2020-11-30 LAB
ALBUMIN SERPL-MCNC: 3.1 G/DL (ref 3.4–5)
ALBUMIN SERPL-MCNC: 3.3 G/DL (ref 3.4–5)
ALP BLD-CCNC: 128 U/L (ref 40–129)
ALT SERPL-CCNC: 761 U/L (ref 10–40)
ANION GAP SERPL CALCULATED.3IONS-SCNC: 14 MMOL/L (ref 3–16)
AST SERPL-CCNC: 585 U/L (ref 15–37)
BASOPHILS ABSOLUTE: 0 K/UL (ref 0–0.2)
BASOPHILS RELATIVE PERCENT: 0.5 %
BILIRUB SERPL-MCNC: 4.7 MG/DL (ref 0–1)
BILIRUBIN DIRECT: 2.7 MG/DL (ref 0–0.3)
BILIRUBIN, INDIRECT: 2 MG/DL (ref 0–1)
BUN BLDV-MCNC: 48 MG/DL (ref 7–20)
CALCIUM SERPL-MCNC: 8.3 MG/DL (ref 8.3–10.6)
CHLORIDE BLD-SCNC: 105 MMOL/L (ref 99–110)
CO2: 21 MMOL/L (ref 21–32)
CREAT SERPL-MCNC: 1 MG/DL (ref 0.8–1.3)
DIGOXIN LEVEL: 1 NG/ML (ref 0.8–2)
EOSINOPHILS ABSOLUTE: 0 K/UL (ref 0–0.6)
EOSINOPHILS RELATIVE PERCENT: 0.1 %
ESTIMATED AVERAGE GLUCOSE: 159.9 MG/DL
GFR AFRICAN AMERICAN: >60
GFR NON-AFRICAN AMERICAN: >60
GLUCOSE BLD-MCNC: 128 MG/DL (ref 70–99)
GLUCOSE BLD-MCNC: 136 MG/DL (ref 70–99)
GLUCOSE BLD-MCNC: 145 MG/DL (ref 70–99)
GLUCOSE BLD-MCNC: 152 MG/DL (ref 70–99)
GLUCOSE BLD-MCNC: 165 MG/DL (ref 70–99)
GLUCOSE BLD-MCNC: 167 MG/DL (ref 70–99)
HBA1C MFR BLD: 7.2 %
HCT VFR BLD CALC: 30.7 % (ref 40.5–52.5)
HEMOGLOBIN: 10 G/DL (ref 13.5–17.5)
INR BLD: 4.83 (ref 0.86–1.14)
LACTIC ACID: 3 MMOL/L (ref 0.4–2)
LYMPHOCYTES ABSOLUTE: 0.4 K/UL (ref 1–5.1)
LYMPHOCYTES RELATIVE PERCENT: 4.9 %
MAGNESIUM: 2 MG/DL (ref 1.8–2.4)
MCH RBC QN AUTO: 32 PG (ref 26–34)
MCHC RBC AUTO-ENTMCNC: 32.6 G/DL (ref 31–36)
MCV RBC AUTO: 98.1 FL (ref 80–100)
MONOCYTES ABSOLUTE: 0.9 K/UL (ref 0–1.3)
MONOCYTES RELATIVE PERCENT: 10.2 %
NEUTROPHILS ABSOLUTE: 7.6 K/UL (ref 1.7–7.7)
NEUTROPHILS RELATIVE PERCENT: 84.3 %
PDW BLD-RTO: 20.1 % (ref 12.4–15.4)
PERFORMED ON: ABNORMAL
PHOSPHORUS: 2.7 MG/DL (ref 2.5–4.9)
PLATELET # BLD: 31 K/UL (ref 135–450)
PMV BLD AUTO: 8.6 FL (ref 5–10.5)
POTASSIUM SERPL-SCNC: 4 MMOL/L (ref 3.5–5.1)
PROTHROMBIN TIME: 56.9 SEC (ref 10–13.2)
RBC # BLD: 3.13 M/UL (ref 4.2–5.9)
SARS-COV-2, NAAT: NOT DETECTED
SODIUM BLD-SCNC: 140 MMOL/L (ref 136–145)
TOTAL PROTEIN: 5.3 G/DL (ref 6.4–8.2)
WBC # BLD: 9 K/UL (ref 4–11)

## 2020-11-30 PROCEDURE — 2700000000 HC OXYGEN THERAPY PER DAY

## 2020-11-30 PROCEDURE — 85610 PROTHROMBIN TIME: CPT

## 2020-11-30 PROCEDURE — 2500000003 HC RX 250 WO HCPCS: Performed by: INTERNAL MEDICINE

## 2020-11-30 PROCEDURE — 6360000002 HC RX W HCPCS: Performed by: INTERNAL MEDICINE

## 2020-11-30 PROCEDURE — 80076 HEPATIC FUNCTION PANEL: CPT

## 2020-11-30 PROCEDURE — 85025 COMPLETE CBC W/AUTO DIFF WBC: CPT

## 2020-11-30 PROCEDURE — 92526 ORAL FUNCTION THERAPY: CPT

## 2020-11-30 PROCEDURE — 2500000003 HC RX 250 WO HCPCS: Performed by: STUDENT IN AN ORGANIZED HEALTH CARE EDUCATION/TRAINING PROGRAM

## 2020-11-30 PROCEDURE — 80162 ASSAY OF DIGOXIN TOTAL: CPT

## 2020-11-30 PROCEDURE — 6360000002 HC RX W HCPCS: Performed by: STUDENT IN AN ORGANIZED HEALTH CARE EDUCATION/TRAINING PROGRAM

## 2020-11-30 PROCEDURE — U0002 COVID-19 LAB TEST NON-CDC: HCPCS

## 2020-11-30 PROCEDURE — 2580000003 HC RX 258: Performed by: NURSE PRACTITIONER

## 2020-11-30 PROCEDURE — 2580000003 HC RX 258: Performed by: STUDENT IN AN ORGANIZED HEALTH CARE EDUCATION/TRAINING PROGRAM

## 2020-11-30 PROCEDURE — 92610 EVALUATE SWALLOWING FUNCTION: CPT

## 2020-11-30 PROCEDURE — 2580000003 HC RX 258: Performed by: INTERNAL MEDICINE

## 2020-11-30 PROCEDURE — 83735 ASSAY OF MAGNESIUM: CPT

## 2020-11-30 PROCEDURE — 6370000000 HC RX 637 (ALT 250 FOR IP): Performed by: STUDENT IN AN ORGANIZED HEALTH CARE EDUCATION/TRAINING PROGRAM

## 2020-11-30 PROCEDURE — 80069 RENAL FUNCTION PANEL: CPT

## 2020-11-30 PROCEDURE — 94640 AIRWAY INHALATION TREATMENT: CPT

## 2020-11-30 PROCEDURE — 94669 MECHANICAL CHEST WALL OSCILL: CPT

## 2020-11-30 PROCEDURE — 94761 N-INVAS EAR/PLS OXIMETRY MLT: CPT

## 2020-11-30 PROCEDURE — 36415 COLL VENOUS BLD VENIPUNCTURE: CPT

## 2020-11-30 PROCEDURE — 99233 SBSQ HOSP IP/OBS HIGH 50: CPT | Performed by: SURGERY

## 2020-11-30 PROCEDURE — 83605 ASSAY OF LACTIC ACID: CPT

## 2020-11-30 RX ORDER — SODIUM CHLORIDE FOR INHALATION 3 %
15 VIAL, NEBULIZER (ML) INHALATION 4 TIMES DAILY
Status: DISCONTINUED | OUTPATIENT
Start: 2020-11-30 | End: 2020-11-30 | Stop reason: HOSPADM

## 2020-11-30 RX ORDER — OXYCODONE HYDROCHLORIDE 5 MG/1
5 TABLET ORAL EVERY 4 HOURS PRN
Qty: 18 TABLET | Refills: 0 | Status: SHIPPED | OUTPATIENT
Start: 2020-11-30 | End: 2020-12-03

## 2020-11-30 RX ORDER — LORAZEPAM 2 MG/ML
.5-1 CONCENTRATE ORAL
Qty: 1 BOTTLE | Refills: 0 | Status: SHIPPED | OUTPATIENT
Start: 2020-11-30 | End: 2020-12-03

## 2020-11-30 RX ORDER — MORPHINE SULFATE 100 MG/5ML
5-10 SOLUTION ORAL
Qty: 1 BOTTLE | Refills: 0 | Status: SHIPPED | OUTPATIENT
Start: 2020-11-30 | End: 2020-11-30 | Stop reason: HOSPADM

## 2020-11-30 RX ORDER — LORAZEPAM 1 MG/1
1 TABLET ORAL EVERY 6 HOURS PRN
Qty: 12 TABLET | Refills: 0 | Status: SHIPPED | OUTPATIENT
Start: 2020-11-30 | End: 2020-12-03

## 2020-11-30 RX ORDER — METOPROLOL TARTRATE 5 MG/5ML
5 INJECTION INTRAVENOUS EVERY 6 HOURS PRN
Status: DISCONTINUED | OUTPATIENT
Start: 2020-11-30 | End: 2020-11-30 | Stop reason: HOSPADM

## 2020-11-30 RX ADMIN — METOPROLOL TARTRATE 5 MG: 5 INJECTION INTRAVENOUS at 01:13

## 2020-11-30 RX ADMIN — SODIUM CHLORIDE 30 MG/ML INHALATION SOLUTION 15 ML: 30 SOLUTION INHALANT at 08:01

## 2020-11-30 RX ADMIN — IPRATROPIUM BROMIDE AND ALBUTEROL SULFATE 1 AMPULE: .5; 3 SOLUTION RESPIRATORY (INHALATION) at 08:02

## 2020-11-30 RX ADMIN — HALOPERIDOL LACTATE 2 MG: 5 INJECTION, SOLUTION INTRAMUSCULAR at 00:16

## 2020-11-30 RX ADMIN — IPRATROPIUM BROMIDE AND ALBUTEROL SULFATE 1 AMPULE: .5; 3 SOLUTION RESPIRATORY (INHALATION) at 12:36

## 2020-11-30 RX ADMIN — SODIUM CHLORIDE 30 MG/ML INHALATION SOLUTION 15 ML: 30 SOLUTION INHALANT at 12:36

## 2020-11-30 RX ADMIN — MUPIROCIN: 20 OINTMENT TOPICAL at 09:57

## 2020-11-30 RX ADMIN — INSULIN LISPRO 1 UNITS: 100 INJECTION, SOLUTION INTRAVENOUS; SUBCUTANEOUS at 04:53

## 2020-11-30 RX ADMIN — ALBUTEROL SULFATE 2.5 MG: 2.5 SOLUTION RESPIRATORY (INHALATION) at 06:09

## 2020-11-30 RX ADMIN — SODIUM PHOSPHATE, MONOBASIC, MONOHYDRATE 15 MMOL: 276; 142 INJECTION, SOLUTION INTRAVENOUS at 09:19

## 2020-11-30 RX ADMIN — Medication 10 ML: at 09:23

## 2020-11-30 RX ADMIN — ALBUTEROL SULFATE 2.5 MG: 2.5 SOLUTION RESPIRATORY (INHALATION) at 18:00

## 2020-11-30 RX ADMIN — SODIUM BICARBONATE: 84 INJECTION, SOLUTION INTRAVENOUS at 01:09

## 2020-11-30 RX ADMIN — MEROPENEM 1 G: 1 INJECTION, POWDER, FOR SOLUTION INTRAVENOUS at 05:00

## 2020-11-30 RX ADMIN — MEROPENEM 1 G: 1 INJECTION, POWDER, FOR SOLUTION INTRAVENOUS at 12:45

## 2020-11-30 RX ADMIN — SODIUM CHLORIDE 30 MG/ML INHALATION SOLUTION 15 ML: 30 SOLUTION INHALANT at 18:00

## 2020-11-30 ASSESSMENT — PAIN SCALES - PAIN ASSESSMENT IN ADVANCED DEMENTIA (PAINAD)
CONSOLABILITY: 0
BREATHING: 0
BODYLANGUAGE: 0
TOTALSCORE: 0
CONSOLABILITY: 0
NEGVOCALIZATION: 0
BODYLANGUAGE: 0
FACIALEXPRESSION: 0
BREATHING: 0
BODYLANGUAGE: 0
NEGVOCALIZATION: 0
BODYLANGUAGE: 0
CONSOLABILITY: 0
NEGVOCALIZATION: 0
CONSOLABILITY: 0
NEGVOCALIZATION: 0
FACIALEXPRESSION: 0
BREATHING: 0
TOTALSCORE: 0
CONSOLABILITY: 0
TOTALSCORE: 0
CONSOLABILITY: 0
BREATHING: 0
FACIALEXPRESSION: 0
TOTALSCORE: 0
BODYLANGUAGE: 0
NEGVOCALIZATION: 0
NEGVOCALIZATION: 0
FACIALEXPRESSION: 0
FACIALEXPRESSION: 0
TOTALSCORE: 0
BREATHING: 0
FACIALEXPRESSION: 0
TOTALSCORE: 0
FACIALEXPRESSION: 0
BREATHING: 0
BODYLANGUAGE: 0
TOTALSCORE: 0
CONSOLABILITY: 0
FACIALEXPRESSION: 0
TOTALSCORE: 0
TOTALSCORE: 0
NEGVOCALIZATION: 0
NEGVOCALIZATION: 0
BREATHING: 0
BODYLANGUAGE: 0
NEGVOCALIZATION: 0
CONSOLABILITY: 0
BREATHING: 0
FACIALEXPRESSION: 0
FACIALEXPRESSION: 0
TOTALSCORE: 0
TOTALSCORE: 0
BODYLANGUAGE: 0
CONSOLABILITY: 0
NEGVOCALIZATION: 0
CONSOLABILITY: 0
CONSOLABILITY: 0
BREATHING: 0
BREATHING: 0
BODYLANGUAGE: 0
BREATHING: 0
FACIALEXPRESSION: 0
NEGVOCALIZATION: 0
BODYLANGUAGE: 0
BODYLANGUAGE: 0

## 2020-11-30 ASSESSMENT — PULMONARY FUNCTION TESTS: PEFR_L/MIN: 18

## 2020-11-30 ASSESSMENT — PAIN SCALES - GENERAL
PAINLEVEL_OUTOF10: 0
PAINLEVEL_OUTOF10: 0

## 2020-11-30 NOTE — PROGRESS NOTES
Pt currently resting in bed. Was able to wake pt up and got him to say his name and . Shortly after, patient fell back asleep mid conversation. All morning medications have been held. Pt is NPO and awaiting speech eval. I spoke with speech therapy. They plan to do eval later today if patient is more alert. Will continue to monitor patient.

## 2020-11-30 NOTE — PROGRESS NOTES
Physical Therapy and Occupational Therapy  Discharge    Chart reviewed. Noted Palliative Care note and hospice involvement. Will sign off with PT/OT and defer any d/c planning to case management.     Dilma Betancourt OTR/L  [id number]

## 2020-11-30 NOTE — PROGRESS NOTES
Pt has IV sodium bicarb running at 100/hr. Bilateral upper extremity edema has increased over shift. Lungs CTA. Dr. Martins Marisel notified via perfect serve. IVF stopped. Will continue to monitor.

## 2020-11-30 NOTE — PROGRESS NOTES
Surgery Daily Progress Note      CC: acute cholecystitis    SUBJECTIVE:  Vital signs stable overnight, tachypneic with SpO2 in low-90's this morning. Reports difficulty breathing and wheezing. Disoriented, unable to answer questions this morning. Remains in restraints. ROS:   A 14 point review of systems was conducted, significant findings as noted above. All other systems negative. OBJECTIVE:    PHYSICAL EXAM:  Vitals:    11/29/20 1707 11/29/20 2025 11/30/20 0001 11/30/20 0409   BP:  128/83 (!) 145/87 136/78   Pulse:  91 72 101   Resp: 22 22 20 21   Temp:  97.8 °F (36.6 °C) 97.9 °F (36.6 °C) 97.7 °F (36.5 °C)   TempSrc:  Oral Oral Axillary   SpO2: 93% 92% 98% 92%   Weight:       Height:           General appearance: Alert, appears uncomfortable, short of breath  HEENT: Normocephalic, extraocular movements grossly intact, moist mucous membranes  Chest/Lungs: wheezing bilaterally  Cardiovascular: Irregularly irregular  Abdomen: Soft, non-distended; unable to assess tend given mental status  Neuro: A&Ox0, no gross motor or sensory neuro deficits  Extremities: no edema, no cyanosis  : Ramirez cath in place with tea colored urine      ASSESSMENT & PLAN:   Mariola Sood is a 80 y.o. male with acute cholecystitis.     - Completely disoriented this morning and short of breath; will give breathing treatment and discontinue Haldol  - SpO2 97 percent  - Continue Meropenem  - Follow up hepatic panel, lactic acid this morning  - Will need to reassess mentation after Haldol has worn off to ensure sepsis is not worsening  - To remain NPO    Tiara Foley MD, PGY-1  11/30/20  6:04 AM  [phone]

## 2020-11-30 NOTE — PROGRESS NOTES
100 ml of clear jania, bloody urine emptied from cantor. Cantor flushed with 60 ml of sterile water per orders. 60 ml of clear yellow urine aspirated, no clots noted.

## 2020-11-30 NOTE — PLAN OF CARE
Problem: Skin Integrity:  Goal: Absence of new skin breakdown  Description: Absence of new skin breakdown  Outcome: Ongoing  Note: Pt at risk for skin breakdown. Skin assessment complete. No signs of skin breakdown. Pt buttocks has blanchable redness and excoriation. Pts skin cleansed with inc cleanser. Cantor draining well, cantor care done q shift. Zinc cream applied to buttocks. Pt repositioned in bed with pillow support. Will continue to monitor. Problem: Pain:  Goal: Control of acute pain  Description: Control of acute pain  Outcome: Ongoing  Note: Pt has abd tenderness. Surgery unable to intervene until INR is decreased. IR consult placed for possible percutaneous approach. Heat applied to area. Pt repositioned frequently. Will continue to assess. Problem: Injury - Risk of, Physical Injury:  Goal: Will remain free from falls  Description: Will remain free from falls  Outcome: Ongoing  Note: Patient at risk for falls due to confusion. Patient resting quietly in bed. Side rails up x3. Bed locked in lowest position. Bed alarm on. Bedside table and call light within reach. Camera in room for safety and pulling at lines. Hourly rounding in place, no attempts to get up on own. Will continue to monitor. Problem: Restraint Use - Nonviolent/Non-Self-Destructive Behavior:  Goal: Absence of restraint indications  Description: Absence of restraint indications  11/30/2020 0158 by Gloria Higginbotham RN  Outcome: Ongoing  Note: Pt is very restless and tries to pull at cantor. Pt yells out frequently. IV haldol given prn q 12. ROM performed q hour and mouth swabbed as well. Will continue to assess need for restraints.

## 2020-11-30 NOTE — DISCHARGE SUMMARY
followed. Treated with broad-spectrum IV antibiotics. Percutaneous cholecystotomy not an option per IR and surgery.      Transaminitis secondary to above, followed with daily CMP     A. fib :-on beta-blocker and digoxin cardiology consulted and followed     Coagulopathy and thrombocytopenia secondary to sepsis and DIC,  follow closely. Hemocology consulted     Parkinson disease, per wife there is no official diagnosis,  continued with home medication     Adrenal insufficiency - history of chronic steroids use , provided stress dose steroids     History of TAVR cardiology followed. Eliquis has been on hold since admission due to coagulopathy     Diabetes mellitus type 2 with elevated blood sugar secondary to steroid increase basal insulin, hemoglobin A1c= 7.4 on 11/22/2020.     Severe anion gap metabolic acidosis with lactic acidosis - Anion gap is improved  Nephrology followed     History of congestive heart failure with ejection fraction of 20 to 25% - Cardiology consulted as above, signed off        Consults: cardiology, nephrology, hematology/oncology and urology    Imaging Studies:  Ct Head Wo Contrast    Result Date: 11/28/2020  CT HEAD WITHOUT CONTRAST HISTORY: Altered mental status PROCEDURE: Noncontrast CT the brain performed with 5 mm contiguous sections. Individualized dose optimization technique was used in order to meet ALARA standards for radiation dose reduction. In addition to vendor specific dose reduction algorithms, the  dose reduction techniques vary based on the specific scanner utilized but frequently include automated exposure control, adjustment of the mA and/or kV according to patient size, and use of iterative reconstruction technique. COMPARISON: October 17, 2020. FINDINGS: There is no acute hemorrhage or mass effect. The ventricles are normal in size and position. The grey-white matter differentiation is preserved. Mild white matter disease is present.  There is global parenchymal volume obstruction. The colon is filled with stool without evidence of wall thickening or inflammatory change. The sigmoid is tortuous with scattered diverticuli. The appendix is normal. PERITONEUM/RETROPERITONEUM: There is a small amount of free ascitic fluid superior to and anterior to the liver. No loculated fluid collections are seen. There is no free air visualized. Is a small amount of free fluid in the pelvis as well. LYMPH NODES: No retroperitoneal or pelvic lymphadenopathy is visualized. VESSELS: Aorta is normal in caliber. The celiac and SMA are normal. The renal arteries are patent. Exam was done in arterial phase and venous structures are not well evaluated. REPRODUCTIVE ORGANS: Radiation seeds are seen in the prostate. URINARY BLADDER: Normal. ABDOMINAL WALL: No abnormality identified. BONES: Posterior fusion is seen in the lumbar spine with severe degenerative change noted. OTHER FINDINGS: None. Cholelithiasis with gallbladder wall thickening and pericholecystic fluid consistent with acute cholecystitis. Please correlate clinically. Small amount of free ascitic fluid. Moderate bilateral effusions with compressive atelectasis and cardiac enlargement as well as evidence of right heart failure. Small kidneys bilaterally with simple cyst in the left kidney. Severe fatty infiltration of the liver. Radiation seeds in the prostate     Xr Chest Portable    Result Date: 11/28/2020  EXAM: AP CHEST RADIOGRAPH INDICATION: pneumonia? COMPARISON: 7/2/2020 FINDINGS: LINES/TUBES:None HEART / MEDIASTINUM: Cardiomediastinal silhouette is enlarged, stable. Stable single lead ICD and prosthetic valve. PLEURAL SPACES: No large pleural effusion or pneumothorax. LUNGS: New patchy left basilar opacities. BONES / SOFT TISSUES: No acute abnormality. OTHER: None. Few patchy left basilar opacities, likely atelectasis, but developing infection cannot be excluded.        Other Significant Diagnostic Studies: As described above    Treatments: As described above    Disposition: Hospice    Discharge Medications:     Lexy Markham \"Gio\"   Home Medication Instructions NUU:[id number]    Printed on:11/30/20 8565   Medication Information                      lamoTRIgine (LAMICTAL) 25 MG tablet  Take 1 tablet by mouth 2 times daily             LORazepam (ATIVAN) 1 MG tablet  Take 1 tablet by mouth every 6 hours as needed for Anxiety for up to 3 days. LORazepam (LORAZEPAM INTENSOL) 2 MG/ML concentrated solution  Take 0.25-0.5 mLs by mouth every 2 hours as needed (Anxiety) for up to 3 days. oxyCODONE (ROXICODONE) 5 MG immediate release tablet  Take 1 tablet by mouth every 4 hours as needed for Pain for up to 3 days. 35 Minutes spent on patient evaluation, counseling and discharge planning.      Signed:  Jessica Perry MD  11/30/2020, 5:37 PM

## 2020-11-30 NOTE — PROGRESS NOTES
Speech Language Pathology  Dysphagia - hold    Chart reviewed, d/w RN Rain Huynh who states pt very lethargic this date and n/a for evaluation. RN will page if pt more alert later this date. Aman Garcia M.S./CCC-SLP #[id number]  Pg.  # [phone]

## 2020-11-30 NOTE — PROGRESS NOTES
HR sustaining 120s intermittently. A fib on tele. BP stable. Dr. Remington Reed notified via perfect serve. New orders placed. 5 mg IV metoprolol given. HR now in 90s. Will continue to monitor.

## 2020-11-30 NOTE — PROGRESS NOTES
[facility]    [facility]              [phone]                          Interval History and plan:      Urine output is 825 mL  Blood pressure is stable    Continue hydrocortisone  30 mg p.o. twice daily. Lactic acidosis from liver disease and sepsis  Continue with IV fluid. Creatinine stable. Bicarbonate is 21. Anion gap is back to normal.  Last lactic acid was 3.4                     Assessment :     Severe anion gap metabolic acidosis with lactic acidosis:  Lactate corrected, evaluation is 9.0 with anion gap of 21. He also has mild hyperkalemia with hyponatremia. Blood pressure is stable. Urine output is not accurately measured is about 450 mL. He is afebrile. Acute cholecystitis:  He is on meropenem  Surgery is on the case. CT scan showed gallstones and pericholecystic fluid consistent with acute cholecystitis. Bilateral kidneys are small and simple cyst in the left kidney. Adrenal insufficiency:  He is on hydrocortisone which will be continued. Patient does have history of congestive heart failure with EF of 20 to 25%. [facility] would like to thank Dougie Loza MD   for opportunity to serve this patient      Please call with questions at-   24 Hrs Answering service [phone] or  7 am- 5 pm via Perfect serve or cell phone  Ariella Seen          CC/reason for consult :     Acidosis and hyperkalemia. HPI :     Jina Harmon is a 80 y.o. male presented to   the hospital on 11/22/2020 with abdominal pain. He has past medical history of hypertension, hyperlipidemia and osteoarthritis. He presented with abdominal pain. He also history of Parkinson's disease. His pain is in the right upper quadrant for about 1 week. It is not associated with any nausea and vomiting. He lives in a nursing facility and was told to come to the hospital.    He is now admitted for acute cholecystitis and is n.p.o.   He was on IV fluid which was switched to lactated Ringer's. He is also placed on Zosyn. Surgery is consulted for percutaneous cholecystectomy. He has history of adrenal insufficiency and is on home steroids. I was called for further management of worsening lactic acidosis and hyperkalemia. ROS:     Seen with-student    positives in bold   Constitutional:  fever, chills, weakness, weight change, fatigue  Skin:  rash, pruritus, hair loss, bruising, dry skin, petechiae  Head, Face, Neck   headaches, swelling,  cervical adenopathy  Respiratory: shortness of breath, cough, or wheezing  Cardiovascular: chest pain, palpitations, dizzy, edema  Gastrointestinal: nausea, vomiting, diarrhea, constipation,belly pain    Yellow skin, blood in stool  Musculoskeletal:  back pain, muscle weakness, gait problems,       joint pain or swelling. Genitourinary:  dysuria, poor urine flow, flank pain, blood in urine  Neurologic:  vertigo, TIA'S, syncope, seizures, focal weakness  Psychosocial:  insomnia, anxiety, or depression. All other remaining systems are negative or unable to obtain        PMH/PSH/SH/Family History:     Past Medical History:   Diagnosis Date    Arthritis     Back pain     CAD (coronary artery disease)     Cancer (HCC)     prostate    Diabetes mellitus ([facility] [address]     Hyperlipidemia     Hypertension     Rosacea        Past Surgical History:   Procedure Laterality Date    BACK SURGERY      CATARACT REMOVAL WITH IMPLANT      COLONOSCOPY  9/21/11    COLONOSCOPY  2/11/2015    polyp, diverticulosis    CORONARY ANGIOPLASTY WITH STENT PLACEMENT      CORONARY ARTERY BYPASS GRAFT      EYE SURGERY      JOINT REPLACEMENT      knee    PROSTATE SURGERY      seed implants    SHOULDER SURGERY          reports that he has never smoked. He has never used smokeless tobacco. He reports that he does not drink alcohol or use drugs. family history includes Cancer in his mother and sister.          Medication:     Current Facility-Administered Medications: metoprolol (LOPRESSOR) injection 5 mg, 5 mg, Intravenous, Q6H PRN  sodium chloride (Inhalant) 3 % nebulizer solution 15 mL, 15 mL, Nebulization, 4x daily  sodium phosphate 15 mmol in dextrose 5 % 250 mL IVPB, 15 mmol, Intravenous, Once  ipratropium-albuterol (DUONEB) nebulizer solution 1 ampule, 1 ampule, Inhalation, Q4H WA  insulin lispro (1 Unit Dial) 0-6 Units, 0-6 Units, Subcutaneous, Q4H  albuterol (PROVENTIL) nebulizer solution 2.5 mg, 2.5 mg, Nebulization, Q6H PRN  sodium bicarbonate 75 mEq in sodium chloride 0.45 % 1,000 mL infusion, , Intravenous, Continuous  insulin glargine (LANTUS;BASAGLAR) injection pen 12 Units, 12 Units, Subcutaneous, Nightly  hydrocortisone (CORTEF) tablet 30 mg, 30 mg, Oral, Nightly  metoprolol tartrate (LOPRESSOR) tablet 25 mg, 25 mg, Oral, BID  guaiFENesin (MUCINEX) extended release tablet 600 mg, 600 mg, Oral, BID PRN  meropenem (MERREM) 1 g in sodium chloride 0.9 % 100 mL IVPB (mini-bag), 1 g, Intravenous, Q8H  magnesium hydroxide (MILK OF MAGNESIA) 400 MG/5ML suspension 30 mL, 30 mL, Oral, Daily PRN  [Held by provider] polyethylene glycol (GLYCOLAX) packet 17 g, 17 g, Oral, Daily  bisacodyl (DULCOLAX) suppository 10 mg, 10 mg, Rectal, Daily PRN  carbidopa-levodopa (SINEMET)  MG per tablet 1 tablet, 1 tablet, Oral, TID  vitamin B-12 (CYANOCOBALAMIN) tablet 1,000 mcg, 1,000 mcg, Oral, Daily  digoxin (LANOXIN) tablet 125 mcg, 125 mcg, Oral, Daily  docusate sodium (COLACE) capsule 100 mg, 100 mg, Oral, BID  linaclotide (LINZESS) capsule 145 mcg, 145 mcg, Oral, QAM AC  mupirocin (BACTROBAN) 2 % ointment, , Topical, Daily  tamsulosin (FLOMAX) capsule 0.4 mg, 0.4 mg, Oral, Nightly  traZODone (DESYREL) tablet 200 mg, 200 mg, Oral, Nightly  lamoTRIgine (LAMICTAL) tablet 25 mg, 25 mg, Oral, BID  atorvastatin (LIPITOR) tablet 20 mg, 20 mg, Oral, Nightly  sodium chloride flush 0.9 % injection 10 mL, 10 mL, Intravenous, 2 times per day  sodium chloride flush 0.9 % injection 10 mL, 10 mL, Intravenous, PRN  polyethylene glycol (GLYCOLAX) packet 17 g, 17 g, Oral, Daily PRN  promethazine (PHENERGAN) tablet 12.5 mg, 12.5 mg, Oral, Q6H PRN **OR** ondansetron (ZOFRAN) injection 4 mg, 4 mg, Intravenous, Q6H PRN  [Held by provider] LORazepam (ATIVAN) tablet 0.5 mg, 0.5 mg, Oral, Nightly PRN  glucose (GLUTOSE) 40 % oral gel 15 g, 15 g, Oral, PRN  dextrose 50 % IV solution, 12.5 g, Intravenous, PRN  glucagon (rDNA) injection 1 mg, 1 mg, Intramuscular, PRN  dextrose 5 % solution, 100 mL/hr, Intravenous, PRN  hydrocortisone (CORTEF) tablet 30 mg, 30 mg, Oral, Daily       Vitals :     Vitals:    11/30/20 0801   BP:    Pulse:    Resp:    Temp:    SpO2: 90%       I & O :       Intake/Output Summary (Last 24 hours) at 11/30/2020 0937  Last data filed at 11/30/2020 0530  Gross per 24 hour   Intake 3812.47 ml   Output 825 ml   Net 2987.47 ml        Physical Examination :     General appearance: He is moaning in pain. HEENT: Lips- normal, teeth- ok , oral mucosa- moist  Neck : Mass- no, appears symmetrical, JVD- not visible  Respiratory: Respiratory effort-okay, wheeze- no, crackles -none  Cardiovascular: Ausculation- No M/R/G, Edema +  Abdomen: visible mass- no, distention- no, scar- no, tenderness-yes in the right upper quadrant.                            hepatosplenomegaly-  no  Musculoskeletal:  clubbing no,cyanosis- no , digital ischemia- no                           muscle strength- grossly normal , tone - grossly normal  Skin: rashes- no , ulcers- no, induration- no, tightening - no  Psychiatric:  Judgement and insight- normal           AAO X 3       LABS:     Recent Labs     11/29/20  0526 11/29/20  1301 11/30/20  0529   WBC 9.5 9.3 9.0   HGB 11.1* 10.8* 10.0*   HCT 34.1* 33.6* 30.7*   PLT 27* 30* 31*     Recent Labs     11/28/20  0522 11/29/20  0526 11/30/20  0529    141 140   K 3.7 3.9 4.0    105 105   CO2 14* 20* 21   BUN 45* 48* 48*   CREATININE 1.1 1.1 1.0   GLUCOSE 68* 88 136*   MG 2.00 2.00 2.00   PHOS  --  2.6 2.7      Nephrology  [address]  Office: [phone]  Fax: [phone]

## 2020-11-30 NOTE — PROGRESS NOTES
Speech Language Pathology  Facility/Department: [address]   CLINICAL BEDSIDE SWALLOW EVALUATION & Treatment Note    NAME: Sarai Sorto  : 1933  MRN: [id number]    ADMISSION DATE: 2020  ADMITTING DIAGNOSIS: has 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; 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; Acute CVA (cerebrovascular accident) ([address] Hyponatremia; Atrial fibrillation ([address] Chronic systolic heart failure ([address] Cholecystitis; and Acute on chronic congestive heart failure ([address] on their problem list.  ONSET DATE: 2020    Recent Chest Xray: (2020)     Impression         Few patchy left basilar opacities, likely atelectasis, but developing infection cannot be excluded. Recent CT Head: (2020)  Impression         No acute intracranial hemorrhage or mass effect. Date of Eval: 2020  Evaluating Therapist: Dahlia Montemayor    Current Diet level:  Current Diet : NPO  Current Liquid Diet : NPO      Primary Complaint  Patient Complaint: None stated. Pain:  Pain Assessment  Pain Assessment: Advanced Dementia  Pain Level: 0  Patient's Stated Pain Goal: No pain  Pain Type: Acute pain  Pain Location: Abdomen  Pain Descriptors: Aching  PAINAD (Pain Assessment in Advance Dementia)  Breathing: normal  Negative Vocalization: none  Facial Expression: smiling or inexpressive  Body Language: relaxed  Consolability: no need to console  PAINAD Score: 0    Reason for Referral  Sarai Sorto was referred for a bedside swallow evaluation to assess the efficiency of his swallow function, identify signs and symptoms of aspiration and make recommendations regarding safe dietary consistencies, effective compensatory strategies, and safe eating environment. Impression  Dysphagia Diagnosis: Mild pharyngeal stage dysphagia;Mild to moderate oral stage dysphagia  Dysphagia Impression : Patient presents with suspected mild-moderate oropharyngeal dysphagia.  Analyzed tolerance ice chips, thin liquids via tsp/cup/straw, and puree. Pt with positive oral acceptance, anterior labial loss of liquids x2, positive laryngeal elevation, oral clearance; reactive cough present with thins via straw only (no other s/s aspiration or penetration). Increased RR observed with PO intake. Recommend Thin Liquids via small cup sips (no straws) / Puree / meds in puree; as tolerated. Will continue to follow. Dysphagia Outcome Severity Scale: Level 4: Mild moderate dysphagia- Intermittent supervision/cueing. One - two diet consistencies restricted     Treatment Plan  Requires SLP Intervention: Yes  Duration/Frequency of Treatment: 3-4x/wk for LOS  D/C Recommendations: To be determined       Recommended Diet and Intervention  Diet Solids Recommendation: Dysphagia Pureed (Dysphagia I)  Liquid Consistency Recommendation: Thin  Recommended Form of Meds: Meds in puree  Recommendations: Dysphagia treatment  Therapeutic Interventions: Diet tolerance monitoring;Oral care; Patient/Family education; Therapeutic PO trials with SLP    Compensatory Swallowing Strategies  Compensatory Swallowing Strategies: No straws;Eat/Feed slowly; Alternate solids and liquids;Upright as possible for all oral intake;Small bites/sips; Total feed    Treatment/Goals  Short-term Goals  Timeframe for Short-term Goals: 1-2 wks or LOS  Goal 1: Patient will tolerate least restrictive diet without overt signs of aspiration or associated respiratory decline. Goal 2: Patient/caregiver will demonstrate understanding of swallow concerns/recommendations. 11/30: Addressed goal; educated pt regarding swallow function, dysphagia, aspiration, compensatory strategies (upright, small sips, no straws, slow rate). Pt needs reinforcement. Cont goal.    General  Chart Reviewed: Yes  Comments: Per admitting H&P (11/22/2020): 'The patient is a 80 y.o. male who presents history of Parkinson's,CAD,DM2,HTN, CHF and  A. Fib.  Presents to the ED with  one week

## 2020-11-30 NOTE — PLAN OF CARE
Completed swallow evaluation. Please refer to EMR.     Dahlia Montemayor M.A., [address]  Speech-Language Pathologist

## 2020-11-30 NOTE — PROGRESS NOTES
Urology Attending Progress Note      Subjective: Patient unable to respond to questions, resting in bed with restraints     Vitals:  /72   Pulse 79   Temp 97.8 °F (36.6 °C) (Axillary)   Resp 22   Ht 6' 1\" (1.854 m)   Wt 223 lb 8.7 oz (101.4 kg)   SpO2 90% Comment: try 1l  BMI 29.49 kg/m²   Temp  Av.8 °F (36.6 °C)  Min: 97.7 °F (36.5 °C)  Max: 97.9 °F (36.6 °C)    Intake/Output Summary (Last 24 hours) at 2020 0954  Last data filed at 2020 0530  Gross per 24 hour   Intake 3812.47 ml   Output 825 ml   Net 2987.47 ml       Exam: Ramirez draining clear urine, no clots seen. Labs:  WBC:    Lab Results   Component Value Date    WBC 9.0 2020     Hemoglobin/Hematocrit:    Lab Results   Component Value Date    HGB 10.0 2020    HCT 30.7 2020     BMP:    Lab Results   Component Value Date     2020    K 4.0 2020    K 3.7 2020     2020    CO2 21 2020    BUN 48 2020    LABALBU 3.3 2020    LABALBU 3.1 2020    CREATININE 1.0 2020    CALCIUM 8.3 2020    GFRAA >60 2020    LABGLOM >60 2020     PT/INR:    Lab Results   Component Value Date    PROTIME 56.9 2020    INR 4.83 2020     PTT:    Lab Results   Component Value Date    APTT 40.3 2020   [APTT    Urinalysis: Negative nitrites    Antibiotic Therapy: Merrem    Imaging:   Impression         Cholelithiasis with gallbladder wall thickening and pericholecystic fluid consistent with acute cholecystitis.  Please correlate clinically.         Small amount of free ascitic fluid.         Moderate bilateral effusions with compressive atelectasis and cardiac enlargement as well as evidence of right heart failure.         Small kidneys bilaterally with simple cyst in the left kidney.         Severe fatty infiltration of the liver.         Radiation seeds in the prostate        Impression/Plan: 81yo M admitted several days ago with sepsis from acute cholecystitis, also in DIC. Patient had indwelling cantor which was ripped out by patient yesterday after he became confused, causing urethral bleeding. Cantor was replaced.  -Patient unable to respond to questions during exam, remains in restraints  -Cantor draining clear jania urine, no clots noted  -Hgb 10.0  -Manually irrigate catheter q shift   -Continue flomax  -Will follow as patient gets treated for acute desi. Call with any questions.     STEWART Wang

## 2020-11-30 NOTE — PROGRESS NOTES
Palliative Care Chart Review  and Check in Note:     NAME:  Ivania Soriano Date: 11/22/2020  Hospital Day:  Hospital Day: 9   Current Code status: DNR-CCA    Palliative care is continuing to following Mr. Issa Bentley for symptom management,  and goals of care discussion as needed. Patient's chart reviewed today 11/30/20. D/w Dr. Lucio Taveras, Dr. Emilee Grewal and [facility] APRN. The pt's INR and LFTs continue to deteriorate. He is not a candidate for IR procedure today. Called pt's wife Gilberto Guillen to discuss goals of care at 21 [phone], no answer, left . Received a call back from pt's wife Gilberto Guillen, discussed goals of care. She reports that she does not want the pt to suffer further as Tabitha Reyes has been suffering for 20 years\". She requests a referral to [address] [place] [address] with the hopes that he can go to their inpatient facility, his wife reports that she lives in an independent facility right across the street. D/w GULSHAN Garay, who will make hospice referral.     The following are the currently established goals/code status, and Symptom management.      Goals of care: Goals are comfort directected    Code status: [facility]    Discharge plan: Likely hospice [facility] & [address]  Inpatient Palliative Care  [phone]

## 2020-11-30 NOTE — PROGRESS NOTES
Disoriented. LFTs and INR deteriorating. Surgery no pans for intervention at present . On IV antibiotics.

## 2020-11-30 NOTE — PROGRESS NOTES
IV D5 infusing at 100/hr. . Dr. Carlos Alberto Naranjo notified via perfect serve. D5 stopped. New orders placed. Will continue to monitor.

## 2020-12-01 NOTE — PROGRESS NOTES
Pt discharged to inpatient hospice via EMS. IV and telemetry removed from patient. Ramirez to go with patient to inpatient hospice. Report given to hospice nurse and discharge instructions reviewed. All of personal belongings sent with patient.

## 2020-12-02 LAB
BLOOD CULTURE, ROUTINE: NORMAL
CULTURE, BLOOD 2: NORMAL